# Patient Record
Sex: MALE | Race: BLACK OR AFRICAN AMERICAN | NOT HISPANIC OR LATINO | Employment: FULL TIME | RURAL
[De-identification: names, ages, dates, MRNs, and addresses within clinical notes are randomized per-mention and may not be internally consistent; named-entity substitution may affect disease eponyms.]

---

## 2017-07-03 ENCOUNTER — OFFICE VISIT (OUTPATIENT)
Dept: FAMILY MEDICINE CLINIC | Facility: CLINIC | Age: 47
End: 2017-07-03

## 2017-07-03 VITALS
HEIGHT: 70 IN | OXYGEN SATURATION: 99 % | BODY MASS INDEX: 32.25 KG/M2 | HEART RATE: 63 BPM | TEMPERATURE: 97.6 F | WEIGHT: 225.3 LBS | DIASTOLIC BLOOD PRESSURE: 90 MMHG | SYSTOLIC BLOOD PRESSURE: 124 MMHG

## 2017-07-03 DIAGNOSIS — N50.811 PAIN IN RIGHT TESTICLE: ICD-10-CM

## 2017-07-03 DIAGNOSIS — Z76.89 ENCOUNTER TO ESTABLISH CARE WITH NEW DOCTOR: ICD-10-CM

## 2017-07-03 DIAGNOSIS — M54.31 SCIATICA OF RIGHT SIDE: Primary | ICD-10-CM

## 2017-07-03 DIAGNOSIS — J30.2 SEASONAL ALLERGIC RHINITIS, UNSPECIFIED ALLERGIC RHINITIS TRIGGER: ICD-10-CM

## 2017-07-03 PROCEDURE — 99203 OFFICE O/P NEW LOW 30 MIN: CPT | Performed by: FAMILY MEDICINE

## 2017-07-03 RX ORDER — CYCLOBENZAPRINE HCL 10 MG
10 TABLET ORAL 2 TIMES DAILY PRN
Qty: 60 TABLET | Refills: 1 | Status: SHIPPED | OUTPATIENT
Start: 2017-07-03 | End: 2018-04-20

## 2017-07-03 RX ORDER — IBUPROFEN 800 MG/1
TABLET ORAL
Refills: 0 | COMMUNITY
Start: 2017-06-02 | End: 2017-07-03

## 2017-07-03 NOTE — PROGRESS NOTES
Subjective Pt of Dr. KHANG LAURA Anderson is a 46 y.o. obese male non-smoker with Seasonal Allergies, Recent concerns for back pain, see PMH/PSH. Pt presents for exam, to est care, discuss Tx and F/u plan. 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    ' Began having back pain last Dec, Mid an R low back, around side, down R leg , into R testicle. Has seen Dr Madrid, given Motrin. Back pain episodes, occurring more since April.  Occasionally has pain up under R ribcage. Allergies have been OK. Would like check up, exam, see what might be causing pain. Had all labs in past few months with Dr. Madrid, was not told if any problem'.     Back Pain   This is a chronic problem. The current episode started more than 1 month ago. The problem occurs daily. The problem has been gradually worsening since onset. The pain is present in the lumbar spine and gluteal. The quality of the pain is described as aching, shooting and stabbing. The pain radiates to the right thigh. The pain is at a severity of 6/10. The pain is moderate. The symptoms are aggravated by standing. Stiffness is present all day. Associated symptoms include leg pain, paresthesias and tingling. Pertinent negatives include no bladder incontinence, bowel incontinence, dysuria, numbness, paresis, pelvic pain, perianal numbness, weakness or weight loss. (R testicle pain) Risk factors include obesity (New work environment). He has tried analgesics and NSAIDs for the symptoms. The treatment provided mild relief.   Obesity   This is a chronic problem. The current episode started more than 1 year ago. The problem occurs daily. Associated symptoms include coughing. Pertinent negatives include no congestion, diaphoresis, neck pain, numbness, vomiting or weakness. The symptoms are aggravated by bending, exertion, twisting and standing (For back pain). He has tried nothing for the symptoms. The treatment  provided no relief.   Breathing Problem   He complains of cough and difficulty breathing. There is no shortness of breath or wheezing. Primary symptoms comments: Seasonal allergies. This is a chronic problem. The problem has been resolved (Controlled). Pertinent negatives include no appetite change, ear pain, postnasal drip, rhinorrhea, sneezing, trouble swallowing or weight loss. His symptoms are aggravated by pollen. Relieved by: OTC meds. He reports significant improvement on treatment.        The following portions of the patient's history were reviewed and updated as appropriate: allergies, current medications, past family history, past medical history, past social history, past surgical history and problem list.    Review of Systems   Constitutional: Negative for activity change, appetite change, diaphoresis, unexpected weight change and weight loss.   HENT: Positive for nosebleeds. Negative for congestion, dental problem, drooling, ear discharge, ear pain, facial swelling, hearing loss, mouth sores, postnasal drip, rhinorrhea, sinus pressure, sneezing, tinnitus, trouble swallowing and voice change.    Eyes: Negative for photophobia, pain, discharge, redness, itching and visual disturbance.        Eye exam [facility] works   Respiratory: Positive for cough. Negative for apnea, choking, chest tightness, shortness of breath, wheezing and stridor.    Cardiovascular: Negative for palpitations and leg swelling.   Gastrointestinal: Negative for abdominal distention, anal bleeding, blood in stool, bowel incontinence, constipation, diarrhea, rectal pain and vomiting.   Endocrine: Negative for cold intolerance, heat intolerance, polydipsia, polyphagia and polyuria.   Genitourinary: Positive for flank pain. Negative for bladder incontinence, decreased urine volume, difficulty urinating, discharge, dysuria, enuresis, frequency, genital sores, hematuria, pelvic pain, penile pain, penile swelling, scrotal swelling, testicular  pain and urgency.        Pain from back shoots into R testicle.   Musculoskeletal: Positive for back pain. Negative for gait problem, neck pain and neck stiffness.   Skin: Negative for color change, pallor and wound.   Allergic/Immunologic: Negative for environmental allergies, food allergies and immunocompromised state.   Neurological: Positive for tingling and paresthesias. Negative for dizziness, tremors, seizures, syncope, facial asymmetry, speech difficulty, weakness, light-headedness and numbness.   Hematological: Negative for adenopathy. Does not bruise/bleed easily.   Psychiatric/Behavioral: Negative for agitation, behavioral problems, confusion, decreased concentration, dysphoric mood, hallucinations, self-injury, sleep disturbance and suicidal ideas. The patient is not nervous/anxious and is not hyperactive.        Objective   Physical Exam   Constitutional: He is oriented to person, place, and time. He appears well-developed and well-nourished.   HENT:   Head: Normocephalic.   Right Ear: External ear normal.   Left Ear: External ear normal.   Mouth/Throat: Oropharynx is clear and moist.   Eyes: Conjunctivae and EOM are normal. Pupils are equal, round, and reactive to light. Right eye exhibits no discharge. Left eye exhibits no discharge. No scleral icterus.   Neck: Normal range of motion. Neck supple. No JVD present. No tracheal deviation present. No thyromegaly present.   Cardiovascular: Normal rate, regular rhythm, normal heart sounds and intact distal pulses.  Exam reveals no gallop and no friction rub.    No murmur heard.  Pulmonary/Chest: Effort normal and breath sounds normal. No respiratory distress. He has no wheezes. He has no rales. He exhibits no tenderness.   Abdominal: Soft. Bowel sounds are normal. He exhibits no distension and no mass. There is no tenderness. No hernia.   Musculoskeletal: Normal range of motion. He exhibits no edema, tenderness or deformity.   Lymphadenopathy:     He has no  cervical adenopathy.   Neurological: He is alert and oriented to person, place, and time. He has normal reflexes. He displays normal reflexes. No cranial nerve deficit. He exhibits normal muscle tone. Coordination normal.   Skin: Skin is warm and dry.   Psychiatric: He has a normal mood and affect. His behavior is normal. Thought content normal.   Nursing note and vitals reviewed.      Assessment/Plan      Adolfo was seen today for establish care, pain and allergies.    Diagnoses and all orders for this visit:    Sciatica of right side  -     XR Spine Lumbar 4+ View (In Office)    Seasonal allergic rhinitis, unspecified allergic rhinitis trigger    Pain in right testicle    BMI 32.0-32.9,adult    Encounter to establish care with new doctor    Other orders  -     cyclobenzaprine (FLEXERIL) 10 MG tablet; Take 1 tablet by mouth 2 (Two) Times a Day As Needed for Muscle Spasms.      Discussed current exam, discussed R sciatica, check x-ray, trial of muscle relaxer. Discussed BMI, BEE, 3-4 small meals, exercise, back exercise. Discussed referral to Physical therapy, desires to wait until after x-ray results, and f/U . Discussed obtaining labs to reviewe, discussed USPSTF recommendations.           This document has been electronically signed by Gilles Canales MD

## 2017-07-04 PROBLEM — Z76.89 ENCOUNTER TO ESTABLISH CARE WITH NEW DOCTOR: Status: ACTIVE | Noted: 2017-07-04

## 2017-07-04 PROBLEM — J30.2 SEASONAL ALLERGIC RHINITIS: Status: ACTIVE | Noted: 2017-07-04

## 2017-07-04 PROBLEM — M54.31 SCIATICA OF RIGHT SIDE: Status: ACTIVE | Noted: 2017-07-04

## 2017-07-04 PROBLEM — N50.811 PAIN IN RIGHT TESTICLE: Status: ACTIVE | Noted: 2017-07-04

## 2017-07-04 NOTE — PATIENT INSTRUCTIONS
Sciatica  Sciatica is pain, numbness, weakness, or tingling along the path of the sciatic nerve. The sciatic nerve starts in the lower back and runs down the back of each leg. The nerve controls the muscles in the lower leg and in the back of the knee. It also provides feeling (sensation) to the back of the thigh, the lower leg, and the sole of the foot. Sciatica is a symptom of another medical condition that pinches or puts pressure on the sciatic nerve.  Generally, sciatica only affects one side of the body. Sciatica usually goes away on its own or with treatment. In some cases, sciatica may keep coming back (recur).  CAUSES  This condition is caused by pressure on the sciatic nerve, or pinching of the sciatic nerve. This may be the result of:  · A disk in between the bones of the spine (vertebrae) bulging out too far (herniated disk).  · Age-related changes in the spinal disks (degenerative disk disease).  · A pain disorder that affects a muscle in the buttock (piriformis syndrome).  · Extra bone growth (bone spur) near the sciatic nerve.  · An injury or break (fracture) of the pelvis.  · Pregnancy.  · Tumor (rare).  RISK FACTORS  The following factors may make you more likely to develop this condition:  · Playing sports that place pressure or stress on the spine, such as football or weight lifting.  · Having poor strength and flexibility.  · A history of back injury.  · A history of back surgery.  · Sitting for long periods of time.  · Doing activities that involve repetitive bending or lifting.  · Obesity.  SYMPTOMS  Symptoms can vary from mild to very severe, and they may include:  · Any of these problems in the lower back, leg, hip, or buttock:    Mild tingling or dull aches.    Burning sensations.    Sharp pains.  · Numbness in the back of the calf or the sole of the foot.  · Leg weakness.  · Severe back pain that makes movement difficult.  These symptoms may get worse when you cough, sneeze, or laugh, or  when you sit or stand for long periods of time. Being overweight may also make symptoms worse. In some cases, symptoms may recur over time.  DIAGNOSIS  This condition may be diagnosed based on:  · Your symptoms.  · A physical exam. Your health care provider may ask you to do certain movements to check whether those movements trigger your symptoms.  · You may have tests, including:    Blood tests.    X-rays.    MRI.    CT scan.  TREATMENT  In many cases, this condition improves on its own, without any treatment. However, treatment may include:  · Reducing or modifying physical activity during periods of pain.  · Exercising and stretching to strengthen your abdomen and improve the flexibility of your spine.  · Icing and applying heat to the affected area.  · Medicines that help:    To relieve pain and swelling.    To relax your muscles.  · Injections of medicines that help to relieve pain, irritation, and inflammation around the sciatic nerve (steroids).  · Surgery.  HOME CARE INSTRUCTIONS  Medicines  · Take over-the-counter and prescription medicines only as told by your health care provider.  · Do not drive or operate heavy machinery while taking prescription pain medicine.  Managing Pain  · If directed, apply ice to the affected area.    Put ice in a plastic bag.    Place a towel between your skin and the bag.    Leave the ice on for 20 minutes, 2-3 times a day.  · After icing, apply heat to the affected area before you exercise or as often as told by your health care provider. Use the heat source that your health care provider recommends, such as a moist heat pack or a heating pad.    Place a towel between your skin and the heat source.    Leave the heat on for 20-30 minutes.    Remove the heat if your skin turns bright red. This is especially important if you are unable to feel pain, heat, or cold. You may have a greater risk of getting burned.  Activity  · Return to your normal activities as told by your health  care provider. Ask your health care provider what activities are safe for you.    Avoid activities that make your symptoms worse.  · Take brief periods of rest throughout the day. Resting in a lying or standing position is usually better than sitting to rest.    When you rest for longer periods, mix in some mild activity or stretching between periods of rest. This will help to prevent stiffness and pain.    Avoid sitting for long periods of time without moving. Get up and move around at least one time each hour.  · Exercise and stretch regularly, as told by your health care provider.  · Do not lift anything that is heavier than 10 lb (4.5 kg) while you have symptoms of sciatica. When you do not have symptoms, you should still avoid heavy lifting, especially repetitive heavy lifting.  · When you lift objects, always use proper lifting technique, which includes:    Bending your knees.    Keeping the load close to your body.    Avoiding twisting.  General Instructions   · Use good posture.    Avoid leaning forward while sitting.    Avoid hunching over while standing.  · Maintain a healthy weight. Excess weight puts extra stress on your back and makes it difficult to maintain good posture.  · Wear supportive, comfortable shoes. Avoid wearing high heels.  · Avoid sleeping on a mattress that is too soft or too hard. A mattress that is firm enough to support your back when you sleep may help to reduce your pain.  · Keep all follow-up visits as told by your health care provider. This is important.  SEEK MEDICAL CARE IF:  · You have pain that wakes you up when you are sleeping.  · You have pain that gets worse when you lie down.  · Your pain is worse than you have experienced in the past.  · Your pain lasts longer than 4 weeks.  · You experience unexplained weight loss.  SEEK IMMEDIATE MEDICAL CARE IF:  · You lose control of your bowel or bladder (incontinence).  · You have:    Weakness in your lower back, pelvis, buttocks,  or legs that gets worse.    Redness or swelling of your back.    A burning sensation when you urinate.     This information is not intended to replace advice given to you by your health care provider. Make sure you discuss any questions you have with your health care provider.     Document Released: 12/12/2002 Document Revised: 04/10/2017 Document Reviewed: 08/26/2016  [employer] Patient Education ©2017 [employer]

## 2017-07-06 ENCOUNTER — TELEPHONE (OUTPATIENT)
Dept: FAMILY MEDICINE CLINIC | Facility: CLINIC | Age: 47
End: 2017-07-06

## 2017-07-06 NOTE — TELEPHONE ENCOUNTER
----- Message from Gilles Canales MD sent at 7/4/2017  7:58 AM CDT -----  Has disc space narrowing , and arthritic changes of Lumbar spine. Will go over at F/U visit.

## 2017-08-03 ENCOUNTER — OFFICE VISIT (OUTPATIENT)
Dept: FAMILY MEDICINE CLINIC | Facility: CLINIC | Age: 47
End: 2017-08-03

## 2017-08-03 VITALS
WEIGHT: 228.1 LBS | BODY MASS INDEX: 32.65 KG/M2 | DIASTOLIC BLOOD PRESSURE: 74 MMHG | OXYGEN SATURATION: 99 % | HEART RATE: 70 BPM | SYSTOLIC BLOOD PRESSURE: 130 MMHG | TEMPERATURE: 98.1 F | HEIGHT: 70 IN

## 2017-08-03 DIAGNOSIS — M79.674 CHRONIC TOE PAIN, RIGHT FOOT: ICD-10-CM

## 2017-08-03 DIAGNOSIS — G89.29 CHRONIC TOE PAIN, RIGHT FOOT: ICD-10-CM

## 2017-08-03 DIAGNOSIS — J30.2 SEASONAL ALLERGIC RHINITIS, UNSPECIFIED ALLERGIC RHINITIS TRIGGER: ICD-10-CM

## 2017-08-03 DIAGNOSIS — M54.31 SCIATICA OF RIGHT SIDE: Primary | ICD-10-CM

## 2017-08-03 PROCEDURE — 99213 OFFICE O/P EST LOW 20 MIN: CPT | Performed by: FAMILY MEDICINE

## 2017-08-05 NOTE — PATIENT INSTRUCTIONS
Calorie Counting for Weight Loss  Calories are energy you get from the things you eat and drink. Your body uses this energy to keep you going throughout the day. The number of calories you eat affects your weight. When you eat more calories than your body needs, your body stores the extra calories as fat. When you eat fewer calories than your body needs, your body burns fat to get the energy it needs.  Calorie counting means keeping track of how many calories you eat and drink each day. If you make sure to eat fewer calories than your body needs, you should lose weight. In order for calorie counting to work, you will need to eat the number of calories that are right for you in a day to lose a healthy amount of weight per week. A healthy amount of weight to lose per week is usually 1-2 lb (0.5-0.9 kg). A dietitian can determine how many calories you need in a day and give you suggestions on how to reach your calorie goal.   WHAT IS MY MY PLAN?  My goal is to have __________ calories per day.   If I have this many calories per day, I should lose around __________ pounds per week.  WHAT DO I NEED TO KNOW ABOUT CALORIE COUNTING?  In order to meet your daily calorie goal, you will need to:  · Find out how many calories are in each food you would like to eat. Try to do this before you eat.  · Decide how much of the food you can eat.  · Write down what you ate and how many calories it had. Doing this is called keeping a food log.  WHERE DO I FIND CALORIE INFORMATION?  The number of calories in a food can be found on a Nutrition Facts label. Note that all the information on a label is based on a specific serving of the food. If a food does not have a Nutrition Facts label, try to look up the calories online or ask your dietitian for help.  HOW DO I DECIDE HOW MUCH TO EAT?  To decide how much of the food you can eat, you will need to consider both the number of calories in one serving and the size of one serving. This  information can be found on the Nutrition Facts label. If a food does not have a Nutrition Facts label, look up the information online or ask your dietitian for help.  Remember that calories are listed per serving. If you choose to have more than one serving of a food, you will have to multiply the calories per serving by the amount of servings you plan to eat. For example, the label on a package of bread might say that a serving size is 1 slice and that there are 90 calories in a serving. If you eat 1 slice, you will have eaten 90 calories. If you eat 2 slices, you will have eaten 180 calories.  HOW DO I KEEP A FOOD LOG?  After each meal, record the following information in your food log:  · What you ate.  · How much of it you ate.  · How many calories it had.  · Then, add up your calories.  Keep your food log near you, such as in a small notebook in your pocket. Another option is to use a mobile naseem or website. Some programs will calculate calories for you and show you how many calories you have left each time you add an item to the log.  WHAT ARE SOME CALORIE COUNTING TIPS?  · Use your calories on foods and drinks that will fill you up and not leave you hungry. Some examples of this include foods like nuts and nut butters, vegetables, lean proteins, and high-fiber foods (more than 5 g fiber per serving).  · Eat nutritious foods and avoid empty calories. Empty calories are calories you get from foods or beverages that do not have many nutrients, such as candy and soda. It is better to have a nutritious high-calorie food (such as an avocado) than a food with few nutrients (such as a bag of chips).  · Know how many calories are in the foods you eat most often. This way, you do not have to look up how many calories they have each time you eat them.  · Look out for foods that may seem like low-calorie foods but are really high-calorie foods, such as baked goods, soda, and fat-free candy.  · Pay attention to calories  in drinks. Drinks such as sodas, specialty coffee drinks, alcohol, and juices have a lot of calories yet do not fill you up. Choose low-calorie drinks like water and diet drinks.  · Focus your calorie counting efforts on higher calorie items. Logging the calories in a garden salad that contains only vegetables is less important than calculating the calories in a milk shake.  · Find a way of tracking calories that works for you. Get creative. Most people who are successful find ways to keep track of how much they eat in a day, even if they do not count every calorie.  WHAT ARE SOME PORTION CONTROL TIPS?  · Know how many calories are in a serving. This will help you know how many servings of a certain food you can have.  · Use a measuring cup to measure serving sizes. This is helpful when you start out. With time, you will be able to estimate serving sizes for some foods.  · Take some time to put servings of different foods on your favorite plates, bowls, and cups so you know what a serving looks like.  · Try not to eat straight from a bag or box. Doing this can lead to overeating. Put the amount you would like to eat in a cup or on a plate to make sure you are eating the right portion.  · Use smaller plates, glasses, and bowls to prevent overeating. This is a quick and easy way to practice portion control. If your plate is smaller, less food can fit on it.  · Try not to multitask while eating, such as watching TV or using your computer. If it is time to eat, sit down at a table and enjoy your food. Doing this will help you to start recognizing when you are full. It will also make you more aware of what and how much you are eating.  HOW CAN I CALORIE COUNT WHEN EATING OUT?  · Ask for smaller portion sizes or child-sized portions.  · Consider sharing an entree and sides instead of getting your own entree.  · If you get your own entree, eat only half. Ask for a box at the beginning of your meal and put the rest of your  "entree in it so you are not tempted to eat it.  · Look for the calories on the menu. If calories are listed, choose the lower calorie options.  · Choose dishes that include vegetables, fruits, whole grains, low-fat dairy products, and lean protein. Focusing on smart food choices from each of the 5 food groups can help you stay on track at restaurants.  · Choose items that are boiled, broiled, grilled, or steamed.  · Choose water, milk, unsweetened iced tea, or other drinks without added sugars. If you want an alcoholic beverage, choose a lower calorie option. For example, a regular tom can have up to 700 calories and a glass of wine has around 150.  · Stay away from items that are buttered, battered, fried, or served with cream sauce. Items labeled \"crispy\" are usually fried, unless stated otherwise.  · Ask for dressings, sauces, and syrups on the side. These are usually very high in calories, so do not eat much of them.  · Watch out for salads. Many people think salads are a healthy option, but this is often not the case. Many salads come with lau, fried chicken, lots of cheese, fried chips, and dressing. All of these items have a lot of calories. If you want a salad, choose a garden salad and ask for grilled meats or steak. Ask for the dressing on the side, or ask for olive oil and vinegar or lemon to use as dressing.  · Estimate how many servings of a food you are given. For example, a serving of cooked rice is ½ cup or about the size of half a tennis ball or one cupcake wrapper. Knowing serving sizes will help you be aware of how much food you are eating at restaurants. The list below tells you how big or small some common portion sizes are based on everyday objects.    1 oz--4 stacked dice.    3 oz--1 deck of cards.    1 tsp--1 dice.    1 Tbsp--½ a Ping-Pong ball.    2 Tbsp--1 Ping-Pong ball.    ½ cup--1 tennis ball or 1 cupcake wrapper.    1 cup--1 baseball.     This information is not intended to " replace advice given to you by your health care provider. Make sure you discuss any questions you have with your health care provider.     Document Released: 12/18/2006 Document Revised: 01/08/2016 Document Reviewed: 10/23/2014  [employer] Patient Education ©2017 [employer]  Preventive Care 40-64 Years, Male  Preventive care refers to lifestyle choices and visits with your health care provider that can promote health and wellness.  WHAT DOES PREVENTIVE CARE INCLUDE?  · A yearly physical exam. This is also called an annual well check.  · Dental exams once or twice a year.  · Routine eye exams. Ask your health care provider how often you should have your eyes checked.  · Personal lifestyle choices, including:    Daily care of your teeth and gums.    Regular physical activity.    Eating a healthy diet.    Avoiding tobacco and drug use.    Limiting alcohol use.    Practicing safe sex.    Taking low-dose aspirin every day starting at age 50.  WHAT HAPPENS DURING AN ANNUAL WELL CHECK?  The services and screenings done by your health care provider during your annual well check will depend on your age, overall health, lifestyle risk factors, and family history of disease.  Counseling  Your health care provider may ask you questions about your:  · Alcohol use.  · Tobacco use.  · Drug use.  · Emotional well-being.  · Home and relationship well-being.  · Sexual activity.  · Eating habits.  · Work and work environment.  Screening  You may have the following tests or measurements:  · Height, weight, and BMI.  · Blood pressure.  · Lipid and cholesterol levels. These may be checked every 5 years, or more frequently if you are over 50 years old.  · Skin check.  · Lung cancer screening. You may have this screening every year starting at age 55 if you have a 30-pack-year history of smoking and currently smoke or have quit within the past 15 years.  · Fecal occult blood test (FOBT) of the stool. You may have this test  every year starting at age 50.  · Flexible sigmoidoscopy or colonoscopy. You may have a sigmoidoscopy every 5 years or a colonoscopy every 10 years starting at age 50.  · Prostate cancer screening. Recommendations will vary depending on your family history and other risks.  · Hepatitis C blood test.  · Hepatitis B blood test.  · Sexually transmitted disease (STD) testing.  · Diabetes screening. This is done by checking your blood sugar (glucose) after you have not eaten for a while (fasting). You may have this done every 1-3 years.  Discuss your test results, treatment options, and if necessary, the need for more tests with your health care provider.  Vaccines  Your health care provider may recommend certain vaccines, such as:  · Influenza vaccine. This is recommended every year.  · Tetanus, diphtheria, and acellular pertussis (Tdap, Td) vaccine. You may need a Td booster every 10 years.  · Varicella vaccine. You may need this if you have not been vaccinated.  · Zoster vaccine. You may need this after age 60.  · Measles, mumps, and rubella (MMR) vaccine. You may need at least one dose of MMR if you were born in 1957 or later. You may also need a second dose.  · Pneumococcal 13-valent conjugate (PCV13) vaccine. You may need this if you have certain conditions and have not been vaccinated.  · Pneumococcal polysaccharide (PPSV23) vaccine. You may need one or two doses if you smoke cigarettes or if you have certain conditions.  · Meningococcal vaccine. You may need this if you have certain conditions.  · Hepatitis A vaccine. You may need this if you have certain conditions or if you travel or work in places where you may be exposed to hepatitis A.  · Hepatitis B vaccine. You may need this if you have certain conditions or if you travel or work in places where you may be exposed to hepatitis B.  · Haemophilus influenzae type b (Hib) vaccine. You may need this if you have certain risk factors.  Talk to your health care  provider about which screenings and vaccines you need and how often you need them.     This information is not intended to replace advice given to you by your health care provider. Make sure you discuss any questions you have with your health care provider.     Document Released: 01/13/2017 Document Reviewed: 01/13/2017  [employer] Interactive Patient Education ©2017 [employer]

## 2017-08-05 NOTE — PROGRESS NOTES
Subjective   Adolfo Barron is a 46 y.o. obese male non-smoker with seasonal Allergies,  R sciatica, see PMH/PSH. Pt presents for exam, to discuss Tx and F/u plan.   ' R back pain shooing into R testicle has resolved. Seems to come back after working, lifting etc. Changed duties at work, has less stress on back. Would like to prevent pain from coming back'.         Back Pain   This is a chronic problem. The current episode started more than 1 month ago. The problem occurs intermittently. The problem has been gradually improving since onset. The pain is present in the lumbar spine and gluteal. The quality of the pain is described as aching, shooting and stabbing. The pain radiates to the right thigh. The pain is at a severity of 0/10. The patient is experiencing no pain. The symptoms are aggravated by standing. Pertinent negatives include no bladder incontinence, bowel incontinence, dysuria, leg pain, numbness, paresis, paresthesias, pelvic pain, perianal numbness, tingling or weakness. (R testicle pain) Risk factors include obesity (New work environment). He has tried analgesics and NSAIDs for the symptoms. The treatment provided mild relief.   Testicle Pain   The patient's pertinent negatives include no pelvic pain, penile discharge, penile pain, scrotal swelling or testicular pain. Pertinent negatives include no constipation, coughing, diarrhea, dysuria, flank pain, frequency, shortness of breath, urgency or vomiting.   Toe Pain    Pertinent negatives include no numbness or tingling.   Obesity   This is a chronic problem. The current episode started more than 1 year ago. The problem occurs daily. Pertinent negatives include no congestion, coughing, diaphoresis, neck pain, numbness, vomiting or weakness. The symptoms are aggravated by bending, exertion, twisting and standing (For back pain). He has tried nothing for the symptoms. The treatment provided no relief.        The following portions of the patient's  history were reviewed and updated as appropriate: allergies, current medications, past family history, past medical history, past social history, past surgical history and problem list.    Review of Systems   Constitutional: Negative for activity change, appetite change, diaphoresis and unexpected weight change.   HENT: Negative for congestion, dental problem, drooling, ear discharge, ear pain, facial swelling, hearing loss, mouth sores, nosebleeds, postnasal drip, rhinorrhea, sinus pressure, sneezing, tinnitus, trouble swallowing and voice change.    Eyes: Negative for photophobia, pain, discharge, redness, itching and visual disturbance.        Eye exam [facility] works   Respiratory: Negative for apnea, cough, choking, chest tightness, shortness of breath, wheezing and stridor.    Cardiovascular: Negative for palpitations and leg swelling.   Gastrointestinal: Negative for abdominal distention, anal bleeding, blood in stool, bowel incontinence, constipation, diarrhea, rectal pain and vomiting.   Endocrine: Negative for cold intolerance, heat intolerance, polydipsia, polyphagia and polyuria.   Genitourinary: Negative for bladder incontinence, decreased urine volume, difficulty urinating, discharge, dysuria, enuresis, flank pain, frequency, genital sores, hematuria, pelvic pain, penile pain, penile swelling, scrotal swelling, testicular pain and urgency.        Pain from back shoots into R testicle.   Musculoskeletal: Negative for back pain, gait problem, neck pain and neck stiffness.        No back pain today   Skin: Negative for color change, pallor and wound.   Allergic/Immunologic: Negative for environmental allergies, food allergies and immunocompromised state.   Neurological: Negative for dizziness, tingling, tremors, seizures, syncope, facial asymmetry, speech difficulty, weakness, light-headedness, numbness and paresthesias.   Hematological: Negative for adenopathy. Does not bruise/bleed easily.    Psychiatric/Behavioral: Negative for agitation, behavioral problems, confusion, decreased concentration, dysphoric mood, hallucinations, self-injury, sleep disturbance and suicidal ideas. The patient is not nervous/anxious and is not hyperactive.        Objective   Physical Exam   Constitutional: He is oriented to person, place, and time. He appears well-developed and well-nourished.   HENT:   Head: Normocephalic.   Right Ear: External ear normal.   Left Ear: External ear normal.   Nose: Nose normal.   Mouth/Throat: Oropharynx is clear and moist.   Eyes: Conjunctivae and EOM are normal. Pupils are equal, round, and reactive to light. Right eye exhibits no discharge. Left eye exhibits no discharge. No scleral icterus.   Neck: Normal range of motion. Neck supple. No JVD present. No tracheal deviation present. No thyromegaly present.   Cardiovascular: Normal rate, regular rhythm, normal heart sounds and intact distal pulses.  Exam reveals no gallop and no friction rub.    No murmur heard.  Pulmonary/Chest: Effort normal and breath sounds normal. No respiratory distress. He has no wheezes. He has no rales. He exhibits no tenderness.   Abdominal: Soft. Bowel sounds are normal. He exhibits no distension and no mass. There is no tenderness. No hernia.   Musculoskeletal: Normal range of motion. He exhibits no edema, tenderness or deformity.   Lymphadenopathy:     He has no cervical adenopathy.   Neurological: He is alert and oriented to person, place, and time. He has normal reflexes. He displays normal reflexes. No cranial nerve deficit. He exhibits normal muscle tone. Coordination normal.   Skin: Skin is warm and dry.   Psychiatric: He has a normal mood and affect. His behavior is normal. Judgment and thought content normal.   Nursing note and vitals reviewed.      Assessment/Plan   Adolfo was seen today for back pain, testicle pain and toe pain.    Diagnoses and all orders for this visit:    Sciatica of right side  -      Ambulatory Referral to Physical Therapy Evaluate and treat    Chronic toe pain, right foot  -     Ambulatory Referral to Podiatry    Seasonal allergic rhinitis, unspecified allergic rhinitis trigger    BMI 32.0-32.9,adult      Discussed current health problem list. Discussed referral to Physical Therapy. Discussed BMI, BEE, 3-4 small meals 1800 regino/day, Discussed USPSTF recommendations, obtaining recent labs for review. Discuss F/U plan.          This document has been electronically signed by Gilles Canales MD

## 2017-08-11 ENCOUNTER — OFFICE VISIT (OUTPATIENT)
Dept: PODIATRY | Facility: CLINIC | Age: 47
End: 2017-08-11

## 2017-08-11 VITALS — HEIGHT: 70 IN | WEIGHT: 228 LBS | BODY MASS INDEX: 32.64 KG/M2

## 2017-08-11 DIAGNOSIS — L60.0 INGROWN TOENAIL: ICD-10-CM

## 2017-08-11 DIAGNOSIS — M79.671 RIGHT FOOT PAIN: Primary | ICD-10-CM

## 2017-08-11 PROCEDURE — 99203 OFFICE O/P NEW LOW 30 MIN: CPT | Performed by: PODIATRIST

## 2017-08-11 NOTE — PROGRESS NOTES
"Adolfo Barron  1970  46 y.o. male  Patient presents today for right hallux pain.   Patient states it only hurts when applying pressure to the toe.    8/11/2017  Chief Complaint   Patient presents with   • Right Foot - Toe Pain           History of Present Illness    46-year-old male presents to clinic today with chief complaint of right foot pain.  Pain is located to the toenail of the great toe.  States that a while back she dropped something on the toe and since then he has had trouble with ingrowing toenail.  He states that he gets infected from time to time.  It causes him pain when applying pressure in shoe gear.  He he cuts it out himself which relieves the pain.  He also complains of small hard areas in the bottom of both of his feet.  These have been cut out in the past which helped.  He has no other pedal complaints.         Past Medical History:   Diagnosis Date   • Acute bronchitis    • Acute otitis media    • Acute sinusitis    • Allergic rhinitis    • Biceps femoris tendinitis    • Candidiasis of skin and nails    • Chronic allergic conjunctivitis    • Cough    • Upper respiratory infection          No past surgical history on file.      No family history on file.      Social History     Social History   • Marital status:      Spouse name: N/A   • Number of children: N/A   • Years of education: N/A     Occupational History   • Not on file.     Social History Main Topics   • Smoking status: Never Smoker   • Smokeless tobacco: Never Used   • Alcohol use Not on file   • Drug use: Not on file   • Sexual activity: Not on file     Other Topics Concern   • Not on file     Social History Narrative         Current Outpatient Prescriptions   Medication Sig Dispense Refill   • cyclobenzaprine (FLEXERIL) 10 MG tablet Take 1 tablet by mouth 2 (Two) Times a Day As Needed for Muscle Spasms. 60 tablet 1     No current facility-administered medications for this visit.          OBJECTIVE    Ht 70\" (177.8 cm) "  Wt 228 lb (103 kg)  BMI 32.71 kg/m2      Review of Systems   Constitutional: Negative for chills and fever.   Cardiovascular: Negative for chest pain.   Gastrointestinal: Negative for constipation, diarrhea, nausea and vomiting.   Skin: Negative for wound.  ingrowing toenail  Musculoskeletal: Right foot pain      Constitutional: well developed, well nourished    HEENT: Normocephalic and atraumatic, normal hearing    Respiratory: Non labored respirations noted    Cardiovascular:    DP/PT pulses palpable    CFT brisk  to all digits  Skin temp is warm to warm from proximal tibia to distal digits  Pedal hair growth present.   No erythema or edema noted     Musculoskeletal:  Muscle strength is 5/5 for all muscle groups tested   ROM of the 1st MTP is full without pain or crepitus  ROM of the MTJ is full without pain or crepitus    ROM of the STJ is full without pain or crepitus    ROM of the ankle joint is full without pain or crepitus    Pain on palpation to the medial border of the right hallux nail  Rectus foot type     Dermatological:   Nails 1-5 are within normal limits for length and thickness, right hallux nail is incurvated on the medial border.  No signs of infection noted    Skin is warm, dry and intact    Webspaces 1-4 bilateral are clean, dry and intact.   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 NAD.             Procedures        ASSESSMENT AND PLAN    Adolfo was seen today for toe pain.    Diagnoses and all orders for this visit:    Right foot pain    Ingrown toenail    - Comprehensive foot and ankle exam performed  - Discussion was held patient regarding treatment of ingrowing toenails.  Options discussed were nail avulsions both temporary and permanent versus slant back.  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  - Slant back performed to right hallux nail  - Trimmed portal keratoses with dermal curet  - All questions were answered and the patient is in agreement with the current treatment plan.  - RTC prn            This document has been electronically signed by Todd Spear DPM on August 11, 2017 3:51 PM     8/11/2017  3:51 PM    EMR Dragon/Transcription disclaimer:   Much of this encounter note is an electronic transcription/translation of spoken language to printed text. The electronic translation of spoken language may permit erroneous, or at times, nonsensical words or phrases to be inadvertently transcribed; Although I have reviewed the note for such errors, some may still exist.

## 2017-08-16 ENCOUNTER — APPOINTMENT (OUTPATIENT)
Dept: PHYSICAL THERAPY | Facility: HOSPITAL | Age: 47
End: 2017-08-16

## 2017-10-19 DIAGNOSIS — M54.31 SCIATICA OF RIGHT SIDE: Primary | ICD-10-CM

## 2017-10-20 ENCOUNTER — HOSPITAL ENCOUNTER (OUTPATIENT)
Dept: PHYSICAL THERAPY | Facility: HOSPITAL | Age: 47
Setting detail: THERAPIES SERIES
Discharge: HOME | End: 2017-10-20

## 2017-10-20 DIAGNOSIS — M54.31 SCIATICA OF RIGHT SIDE: Primary | ICD-10-CM

## 2017-10-20 PROCEDURE — 97162 PT EVAL MOD COMPLEX 30 MIN: CPT | Performed by: PHYSICAL THERAPIST

## 2017-10-20 PROCEDURE — 97110 THERAPEUTIC EXERCISES: CPT | Performed by: PHYSICAL THERAPIST

## 2017-10-20 NOTE — THERAPY EVALUATION
Outpatient Physical Therapy Ortho Initial Evaluation  [facility]  Bonnie Wiggins, PT, DPT, CSCS       Patient Name: Adolfo Barron  : 1970  MRN: [id number]  Today's Date: 10/20/2017      Visit Date: 10/20/2017     Pt reports 0/10 pain pre treatment, 0/10 pain post treatment  Reports N/A% of improvement.  Attended  visits.  Insurance available: 60 visits  Next MD appt: 2017.  Recertification: 11/10/2017.    Patient Active Problem List   Diagnosis   • BMI 32.0-32.9,adult   • Pain in right testicle   • Encounter to establish care with new doctor   • Sciatica of right side   • Seasonal allergic rhinitis   • Chronic toe pain, right foot        Past Medical History:   Diagnosis Date   • Acute bronchitis    • Acute otitis media    • Acute sinusitis    • Allergic rhinitis    • Biceps femoris tendinitis    • Candidiasis of skin and nails    • Chronic allergic conjunctivitis    • Cough    • Upper respiratory infection         History reviewed. No pertinent surgical history.    Visit Dx:     ICD-10-CM ICD-9-CM   1. Sciatica of right side M54.31 724.3     Number of days off work: None    Patient is .    Patient has grown children.           No Known Allergies     Jai as Reviewed Reviewed by You at 8:55 AM.      Outpatient Medications     cyclobenzaprine (FLEXERIL) 10 MG tablet                  Patient History       10/20/17 0800          History    Chief Complaint Pain  -AJ      Type of Pain Lower Extremity / Leg;Back pain  -AJ      Date Current Problem(s) Began --   2017  -AJ      Brief Description of Current Complaint Patient reports he started a new job in February. He reports this job is more standing where his old job was sitting. He reports he was told he has some arthritis in his back. He reports if he stands in one spot >5minutes he gets R leg pain. He reports is he moves around it is okay.  -AJ      Patient/Caregiver Goals Relieve pain;Know what to do  to help the symptoms  -AJ      Current Tobacco Use None  -AJ      Smoking Status Non-smoker  -AJ      Patient's Rating of General Health Very good  -AJ      Occupation/sports/leisure activities Occupation: works line at [employer]; Hobbies: relax  -AJ      What clinical tests have you had for this problem? X-ray  -AJ      Results of Clinical Tests Degenerative changes L4-5, L5-S1. Otherwise  -AJ      History of Previous Related Injuries None  -AJ      Pain     Pain Location Back  -AJ      Pain at Present 0  -AJ      Pain at Best 0  -AJ      Pain at Worst 6  -AJ      Pain Frequency Intermittent  -AJ      What Performance Factors Make the Current Problem(s) WORSE? Bending over or standing  -AJ      What Performance Factors Make the Current Problem(s) BETTER? Changing positions or walking  -AJ      Tolerance Time- Standing 5 minutes  -AJ      Tolerance Time- Sitting unlimited  -AJ      Tolerance Time- Walking unlimited  -AJ      Tolerance Time- Lying unlimited  -AJ      Is your sleep disturbed? Yes   sometimes  -AJ      Difficulties at work? Standing at work  -AJ      Difficulties with ADL's? None.  -AJ      Difficulties with recreational activities? None to report.  -AJ        User Key  (r) = Recorded By, (t) = Taken By, (c) = Cosigned By    Initials Name Provider Type    AJ Bonnie Wiggins, PT Physical Therapist                PT Ortho       10/20/17 0900    Subjective Comments    Subjective Comments Patient reports that he wants to learn what he can do to make the back feel good.  -AJ    Subjective Pain    Able to rate subjective pain? yes  -AJ    Pre-Treatment Pain Level 0  -AJ    Posture/Observations    Posture/Observations Comments No acute distress. Good overall postural awareness.  -AJ    DTR- Lower Quarter Clearing    Patellar tendon (L2-4) Bilateral:;2- Normal response  -AJ    Achilles tendon (S1-2) Bilateral:;2- Normal response  -AJ    Lumbar/SI Special Tests    Standing Flexion Test (SI Dysfunction)  Bilateral:;Negative  -AJ    Stork Test (SI Dysfunction) Bilateral:;Negative  -AJ    Trendelenburg Test (Gluteus Medius Weakness) Bilateral:;Negative  -AJ    Slump Test (Neural Tension) Bilateral:;Negative  -AJ    Lashell Raoul Test (HNP) Negative  -AJ    SLR (Neural Tension) Bilateral:;Negative  -AJ    SI Compression Test (SI Dysfunction) Bilateral:;Negative  -AJ    SI Distraction Test (SI Dysfunction) Bilateral:;Negative  -AJ    ONEIDA (hip vs. SI Dysfunction) Bilateral:;Negative  -AJ    FAIR Test (Piriformis Syndrome) Bilateral:;Negative  -AJ    Sacral Spring Test (SI Dysfunction) Negative  -AJ    ROM (Range of Motion)    General ROM Detail Level pelvis, no LLD noted.  -AJ    Trunk    Flexion AROM --   115°  -AJ    Extension AROM --   30°  -AJ    Left Lateral Flexion AROM WNL (0-35 degrees)  -AJ    Right Lateral Flexion AROM WNL (0-35 degrees)  -AJ    Left Rotation AROM WNL (0-45 degrees)  -AJ    Right Rotation AROM WNL (0-45 degrees)  -AJ    MMT (Manual Muscle Testing)    General MMT Assessment Detail B LE 5/5.  -AJ    Lower Extremity Flexibility    Hamstrings Bilateral:;Mildly limited  -AJ    LE Other Flexibility Bilateral:;Moderately limited   piriformis  -AJ    Pathomechanics    Spine Pathomechanics Hinges into extension at one segment in lumbar;Bends knees with attempted lumbar extension  -AJ    Transfers    Transfer, Comment I with all transfers.  -AJ    Gait Assessment/Treatment    Gait, [place] Level independent  -AJ    Gait, Comment FWB, non-antalgic gait  -AJ      User Key  (r) = Recorded By, (t) = Taken By, (c) = Cosigned By    Initials Name Provider Type    DAISY Wiggins, PT Physical Therapist                Therapy Education       10/20/17 1000          Therapy Education    Given HEP;Symptoms/condition management;Pain management;Posture/body mechanics;Mobility training  -AJ      Program New  -AJ      How Provided Verbal;Demonstration;Written  -AJ      Provided to Patient  -AJ      Level of  "Understanding Verbalized;Demonstrated  -        User Key  (r) = Recorded By, (t) = Taken By, (c) = Cosigned By    Initials Name Provider Type    DAISY Wiggins, PT Physical Therapist                PT OP Goals       10/20/17 0900       PT Short Term Goals    STG Date to Achieve 11/10/17  -     STG 1 I wthi HEP and have addiytions/changes by next recertification.  -AJ     STG 2 Patient able to demonstrate proper log roll technique.  -AJ     STG 3 Patient to report that the rad pain is occuring less often.  -AJ     STG 4 Patient to be more aware of posture and posture correction technique.  -AJ     Long Term Goals    LTG Date to Achieve 11/17/17  -AJ     LTG 1 AROM for lumbar spine all WNL, no increase in pain.  -     LTG 2 Patient able to perform 3 ways planks 10\" hold, x10 each with no increase in pain.  -     LTG 3 Patient able ot show proper lifting technique with no increase in pain from floor to waist to shoulder level.  -     LTG 4 I with final HEP.  -     LTG 5 D/C with final HEP and free 30 day fitness formula membership.  -     Time Calculation    PT Goal Re-Cert Due Date 11/10/17  -       User Key  (r) = Recorded By, (t) = Taken By, (c) = Cosigned By    Initials Name Provider Type    DAISY Wiggins, PT Physical Therapist        Barriers to Rehab: Include significant or possible arthritic/degenerative changes that have occurred within the spine.    Safety Issues: None noted.            PT Assessment/Plan       10/20/17 1000       PT Assessment    Functional Limitations Limitation in home management;Limitations in community activities;Performance in leisure activities;Performance in work activities  -     Impairments Endurance;Impaired muscle endurance;Impaired muscle length;Pain;Poor body mechanics;Posture;Range of motion  -     Assessment Comments Patient did well with all thr ex and given written copy of HEP exercises.  -AJ     Rehab Potential Good  -AJ     " Patient/caregiver participated in establishment of treatment plan and goals Yes  -AJ     Patient would benefit from skilled therapy intervention Yes  -AJ     PT Plan    PT Frequency 2x/week  -     Predicted Duration of Therapy Intervention (days/wks) 2-4 weeks, 4-8 visits  -AJ     Planned CPT's? PT EVAL MOD COMPLELITY: 76595;PT RE-EVAL: 37357;PT THER PROC EA 15 MIN: 23803;PT THER ACT EA 15 MIN: 18118;PT MANUAL THERAPY EA 15 MIN: 27180;PT GAIT TRAINING EA 15 MIN: 21437;PT THER SUPP EA 15 MIN;PT ELECTRICAL STIM UNATTEND:   -     Physical Therapy Interventions (Optional Details) home exercise program;lumbar stabilization;manual therapy techniques;modalities;postural re-education;ROM (Range of Motion);strengthening;stretching;swiss ball techniques;transfer training  -     PT Plan Comments Progress overall core stab.  -AJ       User Key  (r) = Recorded By, (t) = Taken By, (c) = Cosigned By    Initials Name Provider Type    DAISY Wiggins, PT Physical Therapist       Other therapeutic activities and/or exercises will be prescribed depending on the patients progress or lack there of.          Modalities       10/20/17 0900          Ice    Patient denies application of Ice Yes  -        User Key  (r) = Recorded By, (t) = Taken By, (c) = Cosigned By    Initials Name Provider Type    DAISY Wiggins, PT Physical Therapist              Exercises       10/20/17 0900          Subjective Comments    Subjective Comments Patient reports that he wants to learn what he can do to make the back feel good.  -      Subjective Pain    Able to rate subjective pain? yes  -AJ      Pre-Treatment Pain Level 0  -      Exercise 1    Exercise Name 1 Pro II s=8 L4.0  -AJ      Time (Minutes) 1 10 minutes  -      Exercise 2    Exercise Name 2 B St. HS S  -AJ      Reps 2 2  -AJ      Time (Seconds) 2 30 seconds  -      Exercise 3    Exercise Name 3 B sitting piriformis S  -AJ      Reps 3 2  -AJ      Time (Seconds)  "3 30 seconds  -AJ      Exercise 4    Exercise Name 4 LTR  -AJ      Reps 4 10  -AJ      Time (Seconds) 4 10\" hold  -AJ      Exercise 5    Exercise Name 5 JOSE  -AJ      Time (Minutes) 5 5 minutes  -AJ        User Key  (r) = Recorded By, (t) = Taken By, (c) = Cosigned By    Initials Name Provider Type    DAISY Wiggins PT Physical Therapist                              Outcome Measures       10/20/17 1000          Modified Oswestry    Modified Oswestry Score/Comments 1 = 2%  -      Functional Assessment    Outcome Measure Options Modifed Owestry  -AJ        User Key  (r) = Recorded By, (t) = Taken By, (c) = Cosigned By    Initials Name Provider Type    DAISY Wiggins PT Physical Therapist            Time Calculation:   Start Time: 0847  Stop Time: 0930  Time Calculation (min): 43 min  Total Timed Code Minutes- PT: 25 minute(s)     Therapy Charges for Today     Code Description Service Date Service Provider Modifiers Qty    90640002767 HC PT EVAL MOD COMPLEXITY 1 10/20/2017 Bonnie Wiggins, PT GP 1    38062719681 HC PT THER PROC EA 15 MIN 10/20/2017 Bonnie Wiggins, PT GP 2    82166943125 HC PT THER SUPP EA 15 MIN 10/20/2017 Bonnie Wiggins, PT GP 1          PT G-Codes  Outcome Measure Options: Modifed Owestry         Bonnie Wiggins, PT, DPT, CSCS  10/20/2017        "

## 2017-10-25 ENCOUNTER — HOSPITAL ENCOUNTER (OUTPATIENT)
Dept: PHYSICAL THERAPY | Facility: HOSPITAL | Age: 47
Setting detail: THERAPIES SERIES
Discharge: HOME | End: 2017-10-25

## 2017-10-25 DIAGNOSIS — M54.31 SCIATICA OF RIGHT SIDE: Primary | ICD-10-CM

## 2017-10-25 PROCEDURE — 97110 THERAPEUTIC EXERCISES: CPT | Performed by: PHYSICAL THERAPIST

## 2017-10-25 NOTE — THERAPY TREATMENT NOTE
"    Outpatient Physical Therapy Ortho Treatment Note  [facility]  Bonnie Wiggins, PT, DPT, CSCS       Patient Name: Adolfo Barron  : 1970  MRN: [id number]  Today's Date: 10/25/2017      Visit Date: 10/25/2017     Pt reports 2/10 pain pre treatment, 0/10 pain post treatment  Reports 0% of improvement.  Attended 2/2 visits.  Insurance available: 60 visits  Next MD appt: 2017.  Recertification: 11/10/2017.    Visit Dx:    ICD-10-CM ICD-9-CM   1. Sciatica of right side M54.31 724.3       Patient Active Problem List   Diagnosis   • BMI 32.0-32.9,adult   • Pain in right testicle   • Encounter to establish care with new doctor   • Sciatica of right side   • Seasonal allergic rhinitis   • Chronic toe pain, right foot        Past Medical History:   Diagnosis Date   • Acute bronchitis    • Acute otitis media    • Acute sinusitis    • Allergic rhinitis    • Biceps femoris tendinitis    • Candidiasis of skin and nails    • Chronic allergic conjunctivitis    • Cough    • Upper respiratory infection         No past surgical history on file.          PT Ortho       10/25/17 08    Subjective Comments    Subjective Comments Patient reprts she has some mid-back pain today, LB i feeling pretty good.  -    Subjective Pain    Able to rate subjective pain? yes  -    Pre-Treatment Pain Level 2  -    Post-Treatment Pain Level 0   \"worked out\"  -    Posture/Observations    Posture/Observations Comments No distress, poor posture  -      User Key  (r) = Recorded By, (t) = Taken By, (c) = Cosigned By    Initials Name Provider Type    DAISY Wiggins, PT Physical Therapist                            PT Assessment/Plan       10/25/17 08       PT Assessment    Assessment Comments Did well with new ther ex. Good retention of HEP. Decrease in pain post Rx.  -     PT Plan    PT Frequency 2x/week  -     PT Plan Comments Add T-band Rows next session for posture  -       User Key  " "(r) = Recorded By, (t) = Taken By, (c) = Cosigned By    Initials Name Provider Type    AJ Bonnie Wiggins, PT Physical Therapist                    Exercises       10/25/17 0800          Subjective Comments    Subjective Comments Patient reprts she has some mid-back pain today, LB i feeling pretty good.  -AJ      Subjective Pain    Able to rate subjective pain? yes  -AJ      Pre-Treatment Pain Level 2  -AJ      Post-Treatment Pain Level 0   \"worked out\"  -AJ      Exercise 1    Exercise Name 1 Pro II s=8 L4.5  -AJ      Time (Minutes) 1 10 minutes  -AJ      Exercise 2    Exercise Name 2 B St. HS S  -AJ      Reps 2 2  -AJ      Time (Seconds) 2 30 seconds  -AJ      Exercise 3    Exercise Name 3 B sitting piriformis S  -AJ      Reps 3 2  -AJ      Time (Seconds) 3 30 seconds  -AJ      Exercise 4    Exercise Name 4 Elephant S  -AJ      Reps 4 2  -AJ      Time (Seconds) 4 30 seconds  -AJ      Exercise 5    Exercise Name 5 Seated Thoracic ROT S  -AJ      Reps 5 10  -AJ      Time (Seconds) 5 10\" hold  -AJ      Exercise 6    Exercise Name 6 Bridges  -AJ      Sets 6 2  -AJ      Reps 6 10  -AJ      Time (Seconds) 6 5\" hold  -AJ      Exercise 7    Exercise Name 7 LTR  -AJ      Reps 7 10  -AJ      Time (Seconds) 7 10\" hold  -AJ      Exercise 8    Exercise Name 8 Prone alt LE  -AJ      Sets 8 2  -AJ      Reps 8 10  -AJ      Time (Seconds) 8 5\" hold  -AJ      Exercise 9    Exercise Name 9 Prone Heel squeezes  -AJ      Reps 9 20  -AJ      Time (Seconds) 9 5\" hold  -AJ      Exercise 10    Exercise Name 10 JOSE  -AJ      Time (Minutes) 10 5 minutes  -AJ        User Key  (r) = Recorded By, (t) = Taken By, (c) = Cosigned By    Initials Name Provider Type    AJ Bonnie Wiggins, PT Physical Therapist                               PT OP Goals       10/25/17 0800       PT Short Term Goals    STG Date to Achieve 11/10/17  -AJ     STG 1 I wthi HEP and have addiytions/changes by next recertification.  -AJ     STG 1 Progress Met  -AJ  " "   STG 2 Patient able to demonstrate proper log roll technique.  -     STG 2 Progress Ongoing  -     STG 3 Patient to report that the rad pain is occuring less often.  -AJ     STG 3 Progress Ongoing  -     STG 4 Patient to be more aware of posture and posture correction technique.  -     STG 4 Progress Ongoing  -     Long Term Goals    LTG Date to Achieve 11/17/17  -     LTG 1 AROM for lumbar spine all WNL, no increase in pain.  -     LTG 2 Patient able to perform 3 ways planks 10\" hold, x10 each with no increase in pain.  -     LTG 3 Patient able ot show proper lifting technique with no increase in pain from floor to waist to shoulder level.  -     LTG 4 I with final HEP.  -     LTG 5 D/C with final HEP and free 30 day fitness formula membership.  -     Time Calculation    PT Goal Re-Cert Due Date 11/10/17  -       User Key  (r) = Recorded By, (t) = Taken By, (c) = Cosigned By    Initials Name Provider Type    DAISY Wiggins PT Physical Therapist                Therapy Education       10/25/17 0800          Therapy Education    Education Details Added Seated ROT S and Bridges to HEP  -AJ      Given HEP;Symptoms/condition management;Pain management;Posture/body mechanics  -      Program Modified  -AJ      How Provided Verbal;Demonstration;Written  -AJ      Provided to Patient  -AJ      Level of Understanding Verbalized;Demonstrated  -        User Key  (r) = Recorded By, (t) = Taken By, (c) = Cosigned By    Initials Name Provider Type    DAISY Wiggins PT Physical Therapist                Time Calculation:   Start Time: 0846  Stop Time: 0931  Time Calculation (min): 45 min  Total Timed Code Minutes- PT: 45 minute(s)    Therapy Charges for Today     Code Description Service Date Service Provider Modifiers Qty    92735582768  PT THER PROC EA 15 MIN 10/25/2017 Bonnie Wiggins, PT GP 3                    Bonnie Wiggins, PT, DPT, CSCS  10/25/2017       "

## 2017-10-27 ENCOUNTER — HOSPITAL ENCOUNTER (OUTPATIENT)
Dept: PHYSICAL THERAPY | Facility: HOSPITAL | Age: 47
Setting detail: THERAPIES SERIES
Discharge: HOME | End: 2017-10-27

## 2017-10-27 DIAGNOSIS — M54.31 SCIATICA OF RIGHT SIDE: Primary | ICD-10-CM

## 2017-10-27 PROCEDURE — 97110 THERAPEUTIC EXERCISES: CPT | Performed by: PHYSICAL THERAPIST

## 2017-10-27 NOTE — THERAPY TREATMENT NOTE
"    Outpatient Physical Therapy Ortho Treatment Note  [facility]  Bonnie Wiggins, PT, DPT, CSCS       Patient Name: Adolfo Barron  : 1970  MRN: [id number]  Today's Date: 10/27/2017      Visit Date: 10/27/2017     Pt reports 0/10 pain pre treatment, 0/10 pain post treatment  Reports \"Maybe some\"% of improvement.  Attended 3/3 visits.  Insurance available: 60 visits  Next MD appt: 2017.  Recertification: 11/10/2017.    Visit Dx:    ICD-10-CM ICD-9-CM   1. Sciatica of right side M54.31 724.3       Patient Active Problem List   Diagnosis   • BMI 32.0-32.9,adult   • Pain in right testicle   • Encounter to establish care with new doctor   • Sciatica of right side   • Seasonal allergic rhinitis   • Chronic toe pain, right foot        Past Medical History:   Diagnosis Date   • Acute bronchitis    • Acute otitis media    • Acute sinusitis    • Allergic rhinitis    • Biceps femoris tendinitis    • Candidiasis of skin and nails    • Chronic allergic conjunctivitis    • Cough    • Upper respiratory infection         No past surgical history on file.          PT Ortho       10/27/17 0800    Subjective Comments    Subjective Comments Patien reports he is just sore this morning  -    Subjective Pain    Able to rate subjective pain? yes  -    Pre-Treatment Pain Level 0  -    Posture/Observations    Posture/Observations Comments No distress, still some decrease in posture.  -      10/25/17 0800    Subjective Comments    Subjective Comments Patient reprts she has some mid-back pain today, LB i feeling pretty good.  -    Subjective Pain    Able to rate subjective pain? yes  -    Pre-Treatment Pain Level 2  -    Post-Treatment Pain Level 0   \"worked out\"  -    Posture/Observations    Posture/Observations Comments No distress, poor posture  -      User Key  (r) = Recorded By, (t) = Taken By, (c) = Cosigned By    Initials Name Provider Type    DAISY Wiggins, PT " "Physical Therapist                            PT Assessment/Plan       10/27/17 0900       PT Assessment    Assessment Comments Patient had no issues with new ther ex. He had improved overall postural awareness with scapular exercises and cueing.  -AJ     PT Plan    PT Frequency 2x/week  -AJ     PT Plan Comments Add Seated Core stab next session  -AJ       User Key  (r) = Recorded By, (t) = Taken By, (c) = Cosigned By    Initials Name Provider Type    AJ Bonnie Wiggins, PT Physical Therapist                Modalities       10/27/17 0800          Ice    Patient denies application of Ice Yes  -AJ        User Key  (r) = Recorded By, (t) = Taken By, (c) = Cosigned By    Initials Name Provider Type    AJ Bonnie Wiggins, PT Physical Therapist                Exercises       10/27/17 0800          Subjective Comments    Subjective Comments Marylou reports he is just sore this morning  -AJ      Subjective Pain    Able to rate subjective pain? yes  -AJ      Pre-Treatment Pain Level 0  -AJ      Exercise 1    Exercise Name 1 Pro II s=8 L5.0  -AJ      Time (Minutes) 1 10 minutes  -AJ      Exercise 2    Exercise Name 2 B St. HS S  -AJ      Reps 2 2  -AJ      Time (Seconds) 2 30 seconds  -AJ      Exercise 3    Exercise Name 3 B sitting piriformis S with trunk ROT  -AJ      Reps 3 2  -AJ      Time (Seconds) 3 30 seconds  -AJ      Exercise 4    Exercise Name 4 BTB Rows Lo, Mid  -AJ      Reps 4 20   each  -AJ      Exercise 5    Exercise Name 5 BTB B Shoulder extensions  -AJ      Reps 5 20  -AJ      Exercise 6    Exercise Name 6 Bridges  -AJ      Reps 6 20  -AJ      Time (Seconds) 6 5\" hold  -AJ      Exercise 7    Exercise Name 7 DKTC with PBall  -AJ      Reps 7 20  -AJ      Time (Seconds) 7 3-5\" hold  -AJ      Exercise 8    Exercise Name 8 BKLL  -AJ      Sets 8 2  -AJ      Reps 8 10  -AJ      Exercise 9    Exercise Name 9 Prone alt LE ext  -AJ      Sets 9 2  -AJ      Reps 9 10  -AJ      Time (Seconds) 9 5\" hold  -AJ      " "Exercise 10    Exercise Name 10 Heel squeezes  -AJ      Reps 10 20  -AJ      Time (Seconds) 10 5\" hold  -AJ      Exercise 11    Exercise Name 11 Prone hip IR with RTB  -AJ      Reps 11 20  -AJ      Time (Seconds) 11 5\" hold  -AJ        User Key  (r) = Recorded By, (t) = Taken By, (c) = Cosigned By    Initials Name Provider Type    DAISY Wiggins, PT Physical Therapist                               PT OP Goals       10/27/17 0900       PT Short Term Goals    STG Date to Achieve 11/10/17  -AJ     STG 1 I wthi HEP and have addiytions/changes by next recertification.  -AJ     STG 1 Progress Met  -AJ     STG 2 Patient able to demonstrate proper log roll technique.  -AJ     STG 2 Progress Met  -AJ     STG 3 Patient to report that the rad pain is occuring less often.  -AJ     STG 3 Progress Ongoing  -AJ     STG 4 Patient to be more aware of posture and posture correction technique.  -AJ     STG 4 Progress Ongoing;Partially Met;Progressing  -AJ     STG 4 Progress Comments Still requires some cueing.  -AJ     Long Term Goals    LTG Date to Achieve 11/17/17  -AJ     LTG 1 AROM for lumbar spine all WNL, no increase in pain.  -AJ     LTG 2 Patient able to perform 3 ways planks 10\" hold, x10 each with no increase in pain.  -AJ     LTG 3 Patient able ot show proper lifting technique with no increase in pain from floor to waist to shoulder level.  -AJ     LTG 4 I with final HEP.  -AJ     LTG 5 D/C with final HEP and free 30 day fitness formula membership.  -AJ     Time Calculation    PT Goal Re-Cert Due Date 11/10/17  -AJ       User Key  (r) = Recorded By, (t) = Taken By, (c) = Cosigned By    Initials Name Provider Type    DAISY Wiggins, PT Physical Therapist                Therapy Education       10/27/17 0900          Therapy Education    Given HEP;Posture/body mechanics  -AJ      Program Reinforced  -DAISY      How Provided Verbal  -AJ      Provided to Patient  -AJ      Level of Understanding Verbalized  -DAISY   "      User Key  (r) = Recorded By, (t) = Taken By, (c) = Cosigned By    Initials Name Provider Type    AJ Bonnie Wiggins PT Physical Therapist                Time Calculation:   Start Time: 0850  Stop Time: 0933  Time Calculation (min): 43 min  Total Timed Code Minutes- PT: 43 minute(s)    Therapy Charges for Today     Code Description Service Date Service Provider Modifiers Qty    25770984775 HC PT THER PROC EA 15 MIN 10/27/2017 Bonnie Wiggins, PT GP 3                    Bonnie Wiggins PT, DPT, CSCS  10/27/2017

## 2017-11-01 ENCOUNTER — HOSPITAL ENCOUNTER (OUTPATIENT)
Dept: PHYSICAL THERAPY | Facility: HOSPITAL | Age: 47
Setting detail: THERAPIES SERIES
Discharge: HOME | End: 2017-11-01

## 2017-11-01 DIAGNOSIS — M54.31 SCIATICA OF RIGHT SIDE: Primary | ICD-10-CM

## 2017-11-01 PROCEDURE — 97110 THERAPEUTIC EXERCISES: CPT | Performed by: PHYSICAL THERAPIST

## 2017-11-01 NOTE — THERAPY TREATMENT NOTE
"    Outpatient Physical Therapy Ortho Treatment Note  [facility]  Bonnie Wiggins, PT, DPT, CSCS       Patient Name: Adolfo Barron  : 1970  MRN: [id number]  Today's Date: 2017      Visit Date: 2017     Pt reports 0/10 pain pre treatment,0 /10 pain post treatment  Reports \"Some, not sure how much\"% of improvement.  Attended 4/4 visits.  Insurance available: 60 visits  Next MD appt: 2017.  Recertification: 11/10/2017.    Visit Dx:    ICD-10-CM ICD-9-CM   1. Sciatica of right side M54.31 724.3       Patient Active Problem List   Diagnosis   • BMI 32.0-32.9,adult   • Pain in right testicle   • Encounter to establish care with new doctor   • Sciatica of right side   • Seasonal allergic rhinitis   • Chronic toe pain, right foot        Past Medical History:   Diagnosis Date   • Acute bronchitis    • Acute otitis media    • Acute sinusitis    • Allergic rhinitis    • Biceps femoris tendinitis    • Candidiasis of skin and nails    • Chronic allergic conjunctivitis    • Cough    • Upper respiratory infection         No past surgical history on file.          PT Ortho       17 0800    Subjective Pain    Able to rate subjective pain? yes  -    Pre-Treatment Pain Level 0  -    Post-Treatment Pain Level 0  -    Posture/Observations    Posture/Observations Comments No distress, improving postural awareness.  -      User Key  (r) = Recorded By, (t) = Taken By, (c) = Cosigned By    Initials Name Provider Type    DAISY Wiggins, PT Physical Therapist                  PT Assessment/Plan       17 0800       PT Assessment    Assessment Comments Patient did well with new ther ex. He had good overall postural awareness on seated pball activities.  -     PT Plan    PT Frequency 2x/week  -     PT Plan Comments Add Planks next session  -       User Key  (r) = Recorded By, (t) = Taken By, (c) = Cosigned By    Initials Name Provider Type    DAISY Nicole" "Jeovanny Wiggins PT Physical Therapist                Modalities       11/01/17 0800          Ice    Patient denies application of Ice Yes  -AJ        User Key  (r) = Recorded By, (t) = Taken By, (c) = Cosigned By    Initials Name Provider Type    DAISY Wiggins PT Physical Therapist                Exercises       11/01/17 0800          Subjective Comments    Subjective Comments Patient reports just usual soreness today.  -AJ      Subjective Pain    Able to rate subjective pain? yes  -AJ      Pre-Treatment Pain Level 0  -AJ      Post-Treatment Pain Level 0  -AJ      Exercise 1    Exercise Name 1 Pro II LE s=8  -AJ      Time (Minutes) 1 10 minutes  -AJ      Additional Comments L 5.5  -AJ      Exercise 2    Exercise Name 2 B St. HS S  -AJ      Reps 2 2  -AJ      Time (Seconds) 2 30 seconds  -AJ      Exercise 3    Exercise Name 3 B sitting piriformis S with trunk ROT  -AJ      Reps 3 2  -AJ      Time (Seconds) 3 30 seconds  -AJ      Exercise 4    Exercise Name 4 BTB Rows Lo, Mid  -AJ      Reps 4 20  -AJ      Exercise 5    Exercise Name 5 BTB B Shoulder extensions  -AJ      Reps 5 20  -AJ      Exercise 6    Exercise Name 6 Seated PBall S/S, Circles: cw/ccw, alt marching, alt LAQ  -AJ      Reps 6 20   each  -AJ      Exercise 7    Exercise Name 7 Bridges over PBall  -AJ      Sets 7 2  -AJ      Reps 7 10  -AJ      Time (Seconds) 7 5\" hold  -AJ      Exercise 8    Exercise Name 8 BKLL  -AJ      Sets 8 2  -AJ      Reps 8 10  -AJ      Exercise 9    Exercise Name 9 Heel Squeezes  -AJ      Reps 9 20  -AJ      Time (Seconds) 9 5\" hold  -AJ      Exercise 10    Exercise Name 10 Prone hip IR with RTB  -AJ      Reps 10 20  -AJ      Time (Seconds) 10 5\" hold  -AJ      Exercise 11    Exercise Name 11 Prone alt LE hip ext  -AJ      Sets 11 2  -AJ      Reps 11 10  -AJ      Time (Seconds) 11 5\" hold  -AJ        User Key  (r) = Recorded By, (t) = Taken By, (c) = Cosigned By    Initials Name Provider Type    DAISY Wiggins" "PT Physical Therapist                               PT OP Goals       11/01/17 0800       PT Short Term Goals    STG Date to Achieve 11/10/17  -AJ     STG 1 I wthi HEP and have addiytions/changes by next recertification.  -AJ     STG 1 Progress Met  -AJ     STG 2 Patient able to demonstrate proper log roll technique.  -AJ     STG 2 Progress Met  -AJ     STG 3 Patient to report that the rad pain is occuring less often.  -AJ     STG 3 Progress Met  -AJ     STG 4 Patient to be more aware of posture and posture correction technique.  -AJ     STG 4 Progress Met  -AJ     Long Term Goals    LTG Date to Achieve 11/17/17  -AJ     LTG 1 AROM for lumbar spine all WNL, no increase in pain.  -AJ     LTG 1 Progress Ongoing  -AJ     LTG 2 Patient able to perform 3 ways planks 10\" hold, x10 each with no increase in pain.  -AJ     LTG 2 Progress Ongoing  -AJ     LTG 3 Patient able ot show proper lifting technique with no increase in pain from floor to waist to shoulder level.  -AJ     LTG 3 Progress Ongoing  -AJ     LTG 4 I with final HEP.  -AJ     LTG 4 Progress Ongoing  -AJ     LTG 5 D/C with final HEP and free 30 day fitness formula membership.  -     LTG 5 Progress Ongoing  -AJ     Time Calculation    PT Goal Re-Cert Due Date 11/10/17  -DAISY       User Key  (r) = Recorded By, (t) = Taken By, (c) = Cosigned By    Initials Name Provider Type    DAISY Wiggins, PT Physical Therapist                Therapy Education       11/01/17 0800          Therapy Education    Given HEP;Posture/body mechanics  -DAISY      Program Reinforced  -DAISY      How Provided Verbal  -DAISY      Provided to Patient  -DAISY      Level of Understanding Verbalized  -DAISY        User Key  (r) = Recorded By, (t) = Taken By, (c) = Cosigned By    Initials Name Provider Type    DAISY Wiggins, PT Physical Therapist                Time Calculation:   Start Time: 0847  Stop Time: 0932  Time Calculation (min): 45 min  Total Timed Code Minutes- PT: 45 " minute(s)    Therapy Charges for Today     Code Description Service Date Service Provider Modifiers Qty    89200308496 HC PT THER PROC EA 15 MIN 11/1/2017 Bonnie Wiggins, PT GP 3                    Bonnie Wiggins PT, DPT, CSCS  11/1/2017

## 2017-11-03 ENCOUNTER — HOSPITAL ENCOUNTER (OUTPATIENT)
Dept: PHYSICAL THERAPY | Facility: HOSPITAL | Age: 47
Setting detail: THERAPIES SERIES
Discharge: HOME | End: 2017-11-03

## 2017-11-03 ENCOUNTER — OFFICE VISIT (OUTPATIENT)
Dept: FAMILY MEDICINE CLINIC | Facility: CLINIC | Age: 47
End: 2017-11-03

## 2017-11-03 VITALS
HEIGHT: 70 IN | DIASTOLIC BLOOD PRESSURE: 78 MMHG | WEIGHT: 227.9 LBS | BODY MASS INDEX: 32.63 KG/M2 | HEART RATE: 80 BPM | OXYGEN SATURATION: 99 % | SYSTOLIC BLOOD PRESSURE: 120 MMHG | TEMPERATURE: 98.2 F

## 2017-11-03 DIAGNOSIS — M54.31 SCIATICA OF RIGHT SIDE: Primary | ICD-10-CM

## 2017-11-03 DIAGNOSIS — J30.1 CHRONIC SEASONAL ALLERGIC RHINITIS DUE TO POLLEN: Primary | ICD-10-CM

## 2017-11-03 DIAGNOSIS — M54.31 SCIATICA OF RIGHT SIDE: ICD-10-CM

## 2017-11-03 DIAGNOSIS — N50.811 PAIN IN RIGHT TESTICLE: ICD-10-CM

## 2017-11-03 PROCEDURE — 97110 THERAPEUTIC EXERCISES: CPT | Performed by: PHYSICAL THERAPIST

## 2017-11-03 PROCEDURE — 99214 OFFICE O/P EST MOD 30 MIN: CPT | Performed by: FAMILY MEDICINE

## 2017-11-03 NOTE — PROGRESS NOTES
Subjective   Adolfo Barron is a 46 y.o. obese male non-smoker with seasonal Allergies, R sciatica, see PMH/PSH. Pt presents for exam, to discuss Tx and F/u plan.   ' Continues with Physical therapy, Pain is 0/10 when not working. R back pain shooing into R testicle seems to come back with working, lifting etc. Has changed duties at work, has less stress on back, but pain comes back to 6/10. Would like to prevent pain from coming back. Eyes have been itching, believe from allergies'.      Back Pain   This is a chronic problem. The current episode started more than 1 month ago. The problem occurs intermittently. The problem has been gradually improving since onset. The pain is present in the lumbar spine and gluteal. The quality of the pain is described as aching, shooting and stabbing. The pain radiates to the right thigh. The pain is at a severity of 0/10. The patient is experiencing no pain. The symptoms are aggravated by standing. Pertinent negatives include no bladder incontinence, bowel incontinence, dysuria, leg pain, numbness, paresis, paresthesias, pelvic pain, perianal numbness or weakness. (R testicle pain) Risk factors include obesity (New work environment). He has tried analgesics and NSAIDs for the symptoms. The treatment provided mild relief.   Testicle Pain   The patient's pertinent negatives include no pelvic pain, penile discharge, penile pain, scrotal swelling or testicular pain. Pertinent negatives include no constipation, coughing, diarrhea, dysuria, flank pain, frequency, shortness of breath, urgency or vomiting.   Obesity   This is a chronic problem. The current episode started more than 1 year ago. The problem occurs daily. Pertinent negatives include no congestion, coughing, diaphoresis, neck pain, numbness, vomiting or weakness. The symptoms are aggravated by bending, exertion, twisting and standing (For back pain). He has tried nothing for the symptoms. The treatment provided no relief.    Eye Problem    Both eyes are affected.This is a recurrent problem. The current episode started 1 to 4 weeks ago. The problem occurs intermittently. The patient is experiencing no pain. There is no known exposure to pink eye. Pertinent negatives include no eye discharge, eye redness, photophobia, vomiting or weakness. Treatments tried: OTC antihistamines. The treatment provided moderate relief.        The following portions of the patient's history were reviewed and updated as appropriate: allergies, current medications, past family history, past medical history, past social history, past surgical history and problem list.    Review of Systems   Constitutional: Negative for activity change, appetite change, diaphoresis and unexpected weight change.   HENT: Negative for congestion, dental problem, drooling, ear discharge, ear pain, facial swelling, hearing loss, mouth sores, nosebleeds, postnasal drip, rhinorrhea, sinus pressure, sneezing, tinnitus, trouble swallowing and voice change.    Eyes: Negative for photophobia, pain, discharge, redness, itching and visual disturbance.        Eye exam Vision works   Respiratory: Negative for apnea, cough, choking, chest tightness, shortness of breath, wheezing and stridor.    Cardiovascular: Negative for palpitations and leg swelling.   Gastrointestinal: Negative for abdominal distention, anal bleeding, blood in stool, bowel incontinence, constipation, diarrhea, rectal pain and vomiting.   Endocrine: Negative for cold intolerance, heat intolerance, polydipsia, polyphagia and polyuria.   Genitourinary: Negative for bladder incontinence, decreased urine volume, difficulty urinating, discharge, dysuria, enuresis, flank pain, frequency, genital sores, hematuria, pelvic pain, penile pain, penile swelling, scrotal swelling, testicular pain and urgency.        Pain from back shoots into R testicle.   Musculoskeletal: Negative for back pain, gait problem, neck pain and neck  stiffness.        No back pain today   Skin: Negative for color change, pallor and wound.   Allergic/Immunologic: Negative for environmental allergies, food allergies and immunocompromised state.   Neurological: Negative for dizziness, tremors, seizures, syncope, facial asymmetry, speech difficulty, weakness, light-headedness, numbness and paresthesias.   Hematological: Negative for adenopathy. Does not bruise/bleed easily.   Psychiatric/Behavioral: Negative for agitation, behavioral problems, confusion, decreased concentration, dysphoric mood, hallucinations, self-injury, sleep disturbance and suicidal ideas. The patient is not nervous/anxious and is not hyperactive.        Objective   Physical Exam   Constitutional: He is oriented to person, place, and time. He appears well-developed and well-nourished.   HENT:   Head: Normocephalic.   Right Ear: External ear normal.   Left Ear: External ear normal.   Nose: Nose normal.   Mouth/Throat: Oropharynx is clear and moist.   Conjunctiva mildly injected, tearing domenic.    Eyes: Conjunctivae and EOM are normal. Pupils are equal, round, and reactive to light. Right eye exhibits no discharge. Left eye exhibits no discharge. No scleral icterus.   Neck: Normal range of motion. Neck supple. No JVD present. No tracheal deviation present. No thyromegaly present.   Cardiovascular: Normal rate, regular rhythm, normal heart sounds and intact distal pulses.  Exam reveals no gallop and no friction rub.    No murmur heard.  Pulmonary/Chest: Effort normal and breath sounds normal. No respiratory distress. He has no wheezes. He has no rales. He exhibits no tenderness.   Abdominal: Soft. Bowel sounds are normal. He exhibits no distension and no mass. There is no tenderness. No hernia.   Musculoskeletal: Normal range of motion. He exhibits no edema, tenderness or deformity.   Lymphadenopathy:     He has no cervical adenopathy.   Neurological: He is alert and oriented to person, place, and  time. He has normal reflexes. He displays normal reflexes. No cranial nerve deficit. He exhibits normal muscle tone. Coordination normal.   Skin: Skin is warm and dry.   Psychiatric: He has a normal mood and affect. His behavior is normal. Judgment and thought content normal.   Nursing note and vitals reviewed.      Assessment/Plan      Adolfo was seen today for back pain, allergic rhinitis, testicle pain and eye problem.    Diagnoses and all orders for this visit:    Chronic seasonal allergic rhinitis due to pollen    Pain in right testicle    Sciatica of right side    BMI 32.0-32.9,adult      Discussed current health problems , meds, indications, Tx plan, rationale, discussed F/U plan. Discussed with Pt if not improving enough to tolerate pain for work, consider MRI, referral to back surgeon as indicated'.          This document has been electronically signed by Gilles Canales MD on November 3, 2017

## 2017-11-03 NOTE — THERAPY TREATMENT NOTE
Outpatient Physical Therapy Ortho Treatment Note  [facility]  Bonnie Wiggins, PT, DPT, CSCS       Patient Name: Adolfo Barron  : 1970  MRN: [id number]  Today's Date: 11/3/2017      Visit Date: 2017     Pt reports 0/10 pain pre treatment, 0/10 pain post treatment  Reports 30-35% of improvement.  Attended 5/5 visits.  Insurance available: 60 visits  Next MD appt: 2017.  Recertification: 11/10/2017.    Visit Dx:    ICD-10-CM ICD-9-CM   1. Sciatica of right side M54.31 724.3       Patient Active Problem List   Diagnosis   • BMI 32.0-32.9,adult   • Pain in right testicle   • Encounter to establish care with new doctor   • Sciatica of right side   • Seasonal allergic rhinitis   • Chronic toe pain, right foot        Past Medical History:   Diagnosis Date   • Acute bronchitis    • Acute otitis media    • Acute sinusitis    • Allergic rhinitis    • Biceps femoris tendinitis    • Candidiasis of skin and nails    • Chronic allergic conjunctivitis    • Cough    • Upper respiratory infection         History reviewed. No pertinent surgical history.          PT Ortho       17 0800    Subjective Comments    Subjective Comments Patient reports his usual soreness.  -    Subjective Pain    Able to rate subjective pain? yes  -    Pre-Treatment Pain Level 0  -    Post-Treatment Pain Level 0  -    Posture/Observations    Posture/Observations Comments No distress, improved overall posture.  -AJ      17 0800    Subjective Pain    Able to rate subjective pain? yes  -    Pre-Treatment Pain Level 0  -    Post-Treatment Pain Level 0  -    Posture/Observations    Posture/Observations Comments No distress, improving postural awareness.  -      User Key  (r) = Recorded By, (t) = Taken By, (c) = Cosigned By    Initials Name Provider Type    DAISY Wiggins, PT Physical Therapist                            PT Assessment/Plan       17 0800       PT  "Assessment    Assessment Comments Patient had excellant form with planks. Improved overall postu  -AJ     PT Plan    PT Frequency 2x/week  -AJ     PT Plan Comments Add proper lifting technique next session.  -AJ       User Key  (r) = Recorded By, (t) = Taken By, (c) = Cosigned By    Initials Name Provider Type    AJ Bonnie Wiggins, PT Physical Therapist                    Exercises       11/03/17 0800          Subjective Comments    Subjective Comments Patient reports his usual soreness.  -AJ      Subjective Pain    Able to rate subjective pain? yes  -AJ      Pre-Treatment Pain Level 0  -AJ      Post-Treatment Pain Level 0  -AJ      Exercise 1    Exercise Name 1 Pro II LE s=8  -AJ      Time (Minutes) 1 10 minutes  -AJ      Additional Comments L 6.0  -AJ      Exercise 2    Exercise Name 2 B St. HS S  -AJ      Reps 2 2  -AJ      Time (Seconds) 2 30 seconds  -AJ      Exercise 3    Exercise Name 3 B sitting piriformis S with trunk ROT  -AJ      Reps 3 2  -AJ      Time (Seconds) 3 30 seconds  -AJ      Exercise 4    Exercise Name 4 Seated PBall trunk ROT with BTB  -AJ      Reps 4 20   each side  -AJ      Exercise 5    Exercise Name 5 Seated PBall Rows: mid, Lo  -AJ      Reps 5 20   each  -AJ      Exercise 6    Exercise Name 6 Seated PBall alt marching, alt LAQ  -AJ      Reps 6 20  -AJ      Exercise 7    Exercise Name 7 Bridges over PBall  -AJ      Sets 7 2  -AJ      Reps 7 10  -AJ      Time (Seconds) 7 5\" hold  -AJ      Exercise 8    Exercise Name 8 DKTC with PBall  -AJ      Sets 8 2  -AJ      Reps 8 10  -AJ      Time (Seconds) 8 5\" hold  -AJ      Exercise 9    Exercise Name 9 Prone alt hip ext  -AJ      Sets 9 2  -AJ      Reps 9 10  -AJ      Time (Seconds) 9 5\" hold  -AJ      Exercise 10    Exercise Name 10 Prone hip IR with RTB  -AJ      Reps 10 20  -AJ      Time (Seconds) 10 5\" hold  -AJ      Exercise 11    Exercise Name 11 Prone Planks  -AJ      Reps 11 10  -AJ      Time (Seconds) 11 10\" hold  -AJ      Exercise " "12    Exercise Name 12 B SL Planks  -AJ      Reps 12 5   each side  -AJ      Time (Seconds) 12 10\" hold  -AJ        User Key  (r) = Recorded By, (t) = Taken By, (c) = Cosigned By    Initials Name Provider Type    DAISY Wiggins, PT Physical Therapist                               PT OP Goals       11/03/17 0800       PT Short Term Goals    STG Date to Achieve 11/10/17  -AJ     STG 1 I wthi HEP and have addiytions/changes by next recertification.  -AJ     STG 1 Progress Met  -AJ     STG 2 Patient able to demonstrate proper log roll technique.  -AJ     STG 2 Progress Met  -AJ     STG 3 Patient to report that the rad pain is occuring less often.  -AJ     STG 3 Progress Met  -AJ     STG 4 Patient to be more aware of posture and posture correction technique.  -AJ     STG 4 Progress Met  -AJ     Long Term Goals    LTG Date to Achieve 11/17/17  -AJ     LTG 1 AROM for lumbar spine all WNL, no increase in pain.  -AJ     LTG 1 Progress Met  -AJ     LTG 2 Patient able to perform 3 ways planks 10\" hold, x10 each with no increase in pain.  -AJ     LTG 2 Progress Ongoing;Partially Met  -AJ     LTG 2 Progress Comments Met with prone, not SL  -AJ     LTG 3 Patient able ot show proper lifting technique with no increase in pain from floor to waist to shoulder level.  -AJ     LTG 3 Progress Ongoing  -AJ     LTG 4 I with final HEP.  -AJ     LTG 4 Progress Ongoing  -AJ     LTG 5 D/C with final HEP and free 30 day fitness formula membership.  -     LTG 5 Progress Ongoing  -AJ     Time Calculation    PT Goal Re-Cert Due Date 11/10/17  -AJ       User Key  (r) = Recorded By, (t) = Taken By, (c) = Cosigned By    Initials Name Provider Type    DAISY Wiggins, PT Physical Therapist                Therapy Education       11/03/17 0800          Therapy Education    Given HEP;Posture/body mechanics;Symptoms/condition management  -AJ      Program Reinforced  -DAISY      How Provided Verbal  -AJ      Provided to Patient  -AJ      " Level of Understanding Verbalized  -DAISY        User Key  (r) = Recorded By, (t) = Taken By, (c) = Cosigned By    Initials Name Provider Type    DAISY Wiggins PT Physical Therapist                Time Calculation:   Start Time: 0805  Stop Time: 0849  Time Calculation (min): 44 min  Total Timed Code Minutes- PT: 44 minute(s)    Therapy Charges for Today     Code Description Service Date Service Provider Modifiers Qty    74635160180  PT THER PROC EA 15 MIN 11/3/2017 Bonnie Wiggins PT GP 3    75217870497 HC PT THER SUPP EA 15 MIN 11/3/2017 Bonnie Wiggins, PT GP 1                    Bonnie Wiggins PT, DPT, CSCS  11/3/2017

## 2017-11-08 ENCOUNTER — HOSPITAL ENCOUNTER (OUTPATIENT)
Dept: PHYSICAL THERAPY | Facility: HOSPITAL | Age: 47
Setting detail: THERAPIES SERIES
Discharge: HOME | End: 2017-11-08

## 2017-11-08 DIAGNOSIS — M54.31 SCIATICA OF RIGHT SIDE: Primary | ICD-10-CM

## 2017-11-08 PROCEDURE — 97110 THERAPEUTIC EXERCISES: CPT | Performed by: PHYSICAL THERAPIST

## 2017-11-08 NOTE — THERAPY TREATMENT NOTE
Outpatient Physical Therapy Ortho Treatment Note  [facility]  Bonnie Wiggins, PT, DPT, CSCS       Patient Name: Adolfo Barron  : 1970  MRN: [id number]  Today's Date: 2017      Visit Date: 2017     Pt reports0 /10 pain pre treatment, 4/10 pain post treatment  Reports 30-35% of improvement.  Attended 6/6 visits.  Insurance available: 60 visits  Next MD appt: LILI .  Recertification: 11/10/2017.    Visit Dx:    ICD-10-CM ICD-9-CM   1. Sciatica of right side M54.31 724.3       Patient Active Problem List   Diagnosis   • BMI 32.0-32.9,adult   • Pain in right testicle   • Encounter to establish care with new doctor   • Sciatica of right side   • Seasonal allergic rhinitis   • Chronic toe pain, right foot        Past Medical History:   Diagnosis Date   • Acute bronchitis    • Acute otitis media    • Acute sinusitis    • Allergic rhinitis    • Biceps femoris tendinitis    • Candidiasis of skin and nails    • Chronic allergic conjunctivitis    • Cough    • Upper respiratory infection         No past surgical history on file.          PT Ortho       17 09    Subjective Comments    Subjective Comments Patient reports that he has trouble mainly with his low back at night waking him up. he reports that his leg doesn't bother him.  -DAISY    Subjective Pain    Able to rate subjective pain? yes  -DAISY    Pre-Treatment Pain Level 0  -DAISY    Posture/Observations    Posture/Observations Comments No distress, good overall postural awareness.  -DAISY      User Key  (r) = Recorded By, (t) = Taken By, (c) = Cosigned By    Initials Name Provider Type    DAISY Wiggins, PT Physical Therapist                            PT Assessment/Plan       17 09       PT Assessment    Assessment Comments Required some instruction for proper lifting technique but able to do so after instruction. Excellant form with planks and QP exercise.  -DAISY     PT Plan    PT Frequency 2x/week   "-AJ     PT Plan Comments progress Core strength. Add Cybex lumbar ext. Recert next week with primary PT.  -AJ       User Key  (r) = Recorded By, (t) = Taken By, (c) = Cosigned By    Initials Name Provider Type    DAISY Wiggins, PT Physical Therapist                Modalities       11/08/17 0900          Ice    Patient denies application of Ice Yes  -AJ        User Key  (r) = Recorded By, (t) = Taken By, (c) = Cosigned By    Initials Name Provider Type    DAISY Wiggins, PT Physical Therapist                Exercises       11/08/17 0900          Subjective Comments    Subjective Comments Patient reports that he has trouble mainly with his low back at night waking him up. he reports that his leg doesn't bother him.  -AJ      Subjective Pain    Able to rate subjective pain? yes  -AJ      Pre-Treatment Pain Level 0  -AJ      Post-Treatment Pain Level 4  -AJ      Exercise 1    Exercise Name 1 Pro II LE s=8  -AJ      Time (Minutes) 1 10 minutes  -AJ      Additional Comments L 6.0  -AJ      Exercise 2    Exercise Name 2 B St. HS S  -AJ      Reps 2 2  -AJ      Time (Seconds) 2 30 seconds  -AJ      Exercise 3    Exercise Name 3 B sitting piriformis S with trunk ROT  -AJ      Reps 3 2  -AJ      Time (Seconds) 3 30 seconds  -AJ      Exercise 4    Exercise Name 4 LTR  -AJ      Reps 4 10  -AJ      Time (Seconds) 4 10\" hold  -AJ      Exercise 5    Exercise Name 5 Bridges over P.Ball  -AJ      Reps 5 20  -AJ      Time (Seconds) 5 5\" hold  -AJ      Exercise 6    Exercise Name 6 QP alt LE  -AJ      Sets 6 2  -AJ      Reps 6 10  -AJ      Time (Seconds) 6 5\" hold  -AJ      Exercise 7    Exercise Name 7 Prone Planks  -AJ      Reps 7 10  -AJ      Time (Seconds) 7 10\" hold  -AJ      Exercise 8    Exercise Name 8 SL Planks  -AJ      Reps 8 10  -AJ      Time (Seconds) 8 5\" hold  -AJ      Exercise 9    Exercise Name 9 Floor to waist lift @ lift station  -AJ      Reps 9 5  -AJ      Additional Comments 10#  -AJ        User " "Key  (r) = Recorded By, (t) = Taken By, (c) = Cosigned By    Initials Name Provider Type    DAISY Wiggins, PT Physical Therapist                               PT OP Goals       11/08/17 0900       PT Short Term Goals    STG Date to Achieve 11/10/17  -AJ     STG 1 I wthi HEP and have addiytions/changes by next recertification.  -AJ     STG 1 Progress Met  -AJ     STG 2 Patient able to demonstrate proper log roll technique.  -AJ     STG 2 Progress Met  -AJ     STG 3 Patient to report that the rad pain is occuring less often.  -AJ     STG 3 Progress Met  -AJ     STG 4 Patient to be more aware of posture and posture correction technique.  -AJ     STG 4 Progress Met  -AJ     Long Term Goals    LTG Date to Achieve 11/17/17  -AJ     LTG 1 AROM for lumbar spine all WNL, no increase in pain.  -AJ     LTG 1 Progress Met  -AJ     LTG 2 Patient able to perform 3 ways planks 10\" hold, x10 each with no increase in pain.  -AJ     LTG 2 Progress Met  -AJ     LTG 3 Patient able ot show proper lifting technique with no increase in pain from floor to waist to shoulder level.  -AJ     LTG 3 Progress Ongoing;Partially Met  -AJ     LTG 3 Progress Comments With some instruction today.  -AJ     LTG 4 I with final HEP.  -AJ     LTG 4 Progress Ongoing  -AJ     LTG 5 D/C with final HEP and free 30 day fitness formula membership.  -     LTG 5 Progress Ongoing  -AJ     Time Calculation    PT Goal Re-Cert Due Date 11/10/17  -       User Key  (r) = Recorded By, (t) = Taken By, (c) = Cosigned By    Initials Name Provider Type    DAISY Wiggins, PT Physical Therapist                Therapy Education       11/08/17 0900          Therapy Education    Given HEP;Posture/body mechanics;Symptoms/condition management  -      Program Reinforced  -DAISY      How Provided Verbal;Demonstration  -AJ      Provided to Patient  -AJ      Level of Understanding Verbalized;Demonstrated  -AJ        User Key  (r) = Recorded By, (t) = Taken By, " (c) = Cosigned By    Initials Name Provider Type    DAISY Wiggins PT Physical Therapist                Time Calculation:   Start Time: 0850  Stop Time: 0936  Time Calculation (min): 46 min  Total Timed Code Minutes- PT: 46 minute(s)    Therapy Charges for Today     Code Description Service Date Service Provider Modifiers Qty    10263435162 HC PT THER PROC EA 15 MIN 11/8/2017 Bonnie Wiggins, PT GP 3                    Bonnie Wiggins PT, DPT, CSCS  11/8/2017

## 2017-11-10 ENCOUNTER — HOSPITAL ENCOUNTER (OUTPATIENT)
Dept: PHYSICAL THERAPY | Facility: HOSPITAL | Age: 47
Setting detail: THERAPIES SERIES
Discharge: HOME | End: 2017-11-10

## 2017-11-10 DIAGNOSIS — M54.31 SCIATICA OF RIGHT SIDE: Primary | ICD-10-CM

## 2017-11-10 PROCEDURE — G0283 ELEC STIM OTHER THAN WOUND: HCPCS | Performed by: PHYSICAL THERAPIST

## 2017-11-10 PROCEDURE — 97110 THERAPEUTIC EXERCISES: CPT | Performed by: PHYSICAL THERAPIST

## 2017-11-10 NOTE — THERAPY TREATMENT NOTE
Outpatient Physical Therapy Ortho Treatment Note  [facility]     Patient Name: Adolfo Barron  : 1970  MRN: [id number]  Today's Date: 11/10/2017      Visit Date: 11/10/2017  Pt reports 9/10 pain pre treatment, 6/10 pain post treatment  Reports 30-35% of improvement.  Attended 7/7 visits.  Insurance available: 60 visits  Next MD appt: TBQIAN .  Recertification: 11/10/2017.  Visit Dx:    ICD-10-CM ICD-9-CM   1. Sciatica of right side M54.31 724.3       Patient Active Problem List   Diagnosis   • BMI 32.0-32.9,adult   • Pain in right testicle   • Encounter to establish care with new doctor   • Sciatica of right side   • Seasonal allergic rhinitis   • Chronic toe pain, right foot        Past Medical History:   Diagnosis Date   • Acute bronchitis    • Acute otitis media    • Acute sinusitis    • Allergic rhinitis    • Biceps femoris tendinitis    • Candidiasis of skin and nails    • Chronic allergic conjunctivitis    • Cough    • Upper respiratory infection         No past surgical history on file.          PT Ortho       11/10/17 0900    Posture/Observations    Posture/Observations Comments No distress, good overall postural awareness.  -BS      17 0900    Subjective Comments    Subjective Comments Patient reports that he has trouble mainly with his low back at night waking him up. he reports that his leg doesn't bother him.  -DAISY    Subjective Pain    Able to rate subjective pain? yes  -DAISY    Pre-Treatment Pain Level 0  -DAISY    Posture/Observations    Posture/Observations Comments No distress, good overall postural awareness.  -DAISY      User Key  (r) = Recorded By, (t) = Taken By, (c) = Cosigned By    Initials Name Provider Type    DAISY Wiggins, PT Physical Therapist    BS Mark Cochran, PT Physical Therapist                            PT Assessment/Plan       11/10/17 1200       PT Assessment    Assessment Comments reduced R LE radicular symptoms with prone lying on 2  "pillows, exacerbated L LE sciatica with prone press ups. Centralize R LE radicular symptoms before adding high level core stability ex's  -BS     Please refer to paper survey for additional self-reported information No  -BS     PT Plan    PT Frequency 2x/week  -BS     PT Plan Comments review prone progression to centralize R LE radicular symptoms.  -BS       User Key  (r) = Recorded By, (t) = Taken By, (c) = Cosigned By    Initials Name Provider Type    RICO Cochran, PT Physical Therapist                Modalities       11/10/17 0900          Ice    Ice Applied Yes  -BS      Location lumbar spine-prone  -BS      Rx Minutes 15 mins  -BS      Ice S/P Rx Yes  -BS      ELECTRICAL STIMULATION    Attended/Unattended Unattended  -BS      Stimulation Type IFC  -BS      Location/Electrode Placement/Other --   lumbar spine  -BS      Rx Minutes 15 mins  -BS        User Key  (r) = Recorded By, (t) = Taken By, (c) = Cosigned By    Initials Name Provider Type    BS Mark Cochran, PT Physical Therapist                Exercises       11/10/17 0900          Subjective Comments    Subjective Comments Pt reports severe central LBP without R LE sciatica this morning, pt reports long hours at work lately.  -BS      Subjective Pain    Able to rate subjective pain? yes  -BS      Pre-Treatment Pain Level 9  -BS      Post-Treatment Pain Level 6  -BS      Exercise 1    Exercise Name 1 Pro II LE s=8  -BS      Time (Minutes) 1 10 minutes  -BS      Additional Comments L 5.0  -BS      Exercise 2    Exercise Name 2 prone on elbows  -BS      Time (Minutes) 2 5'  -BS      Exercise 3    Exercise Name 3 prone lying on 2 pillows  -BS      Time (Minutes) 3 5'  -BS      Exercise 4    Exercise Name 4 supine piriformis stretch  -BS      Sets 4 1  -BS      Reps 4 3  -BS      Time (Seconds) 4 20\" hold  -BS      Exercise 5    Exercise Name 5 prone press ups  -BS      Sets 5 1  -BS      Reps 5 2  -BS      Time (Seconds) 5 increased R LE sciatica  -BS      " "Exercise 6    Exercise Name 6 pt ed sitting posture  -BS      Time (Minutes) 6 5'  -BS      Exercise 7    Exercise Name 7 supine pelvic tilts  -BS      Sets 7 1  -BS      Reps 7 20  -BS        User Key  (r) = Recorded By, (t) = Taken By, (c) = Cosigned By    Initials Name Provider Type    RICO Cochran, PT Physical Therapist                               PT OP Goals       11/10/17 1200 11/10/17 0900    PT Short Term Goals    STG Date to Achieve 11/10/17  -BS     STG 1 I wthi HEP and have addiytions/changes by next recertification.  -BS     STG 1 Progress Met  -BS     STG 2 Patient able to demonstrate proper log roll technique.  -BS     STG 2 Progress Met  -BS     STG 3 Patient to report that the rad pain is occuring less often.  -BS     STG 3 Progress Met  -BS     STG 4 Patient to be more aware of posture and posture correction technique.  -BS     STG 4 Progress Met  -BS     Long Term Goals    LTG Date to Achieve 11/17/17  -BS     LTG 1 AROM for lumbar spine all WNL, no increase in pain.  -BS     LTG 1 Progress Met  -BS     LTG 2 Patient able to perform 3 ways planks 10\" hold, x10 each with no increase in pain.  -BS     LTG 2 Progress Met  -BS     LTG 3 Patient able ot show proper lifting technique with no increase in pain from floor to waist to shoulder level.  -BS     LTG 3 Progress Ongoing;Partially Met  -BS     LTG 4 I with final HEP.  -BS     LTG 4 Progress Ongoing  -BS     LTG 5 D/C with final HEP and free 30 day fitness formula membership.  -BS     LTG 5 Progress Ongoing  -BS     Time Calculation    PT Goal Re-Cert Due Date  11/10/17  -BS      User Key  (r) = Recorded By, (t) = Taken By, (c) = Cosigned By    Initials Name Provider Type    RICO Cochran, PT Physical Therapist                Therapy Education       11/10/17 1200          Therapy Education    Given HEP;Posture/body mechanics;Symptoms/condition management  -BS      Program New  -BS      How Provided Verbal;Demonstration  -BS      Provided " to Patient  -BS      Level of Understanding Verbalized;Demonstrated  -BS        User Key  (r) = Recorded By, (t) = Taken By, (c) = Cosigned By    Initials Name Provider Type    BS Mark Cochran, PT Physical Therapist                Time Calculation:   Start Time: 0855  Stop Time: 0945  Time Calculation (min): 50 min    Therapy Charges for Today     Code Description Service Date Service Provider Modifiers Qty    58638148006  PT THER PROC EA 15 MIN 11/10/2017 Mark Cochran, PT GP 2    [id number]  PT ELECTRICAL STIM UNATTENDED 11/10/2017 Mark Cochran, PT  1                    Mark Cochran, PT  11/10/2017

## 2017-11-15 ENCOUNTER — HOSPITAL ENCOUNTER (OUTPATIENT)
Dept: PHYSICAL THERAPY | Facility: HOSPITAL | Age: 47
Setting detail: THERAPIES SERIES
Discharge: HOME | End: 2017-11-15

## 2017-11-15 DIAGNOSIS — M54.31 SCIATICA OF RIGHT SIDE: Primary | ICD-10-CM

## 2017-11-15 PROCEDURE — 97110 THERAPEUTIC EXERCISES: CPT | Performed by: PHYSICAL THERAPIST

## 2017-11-15 NOTE — THERAPY PROGRESS REPORT/RE-CERT
Outpatient Physical Therapy Ortho Progress Note  [facility]  Bonnie Wiggins, PT, DPT, CSCS       Patient Name: Adolfo Barron  : 1970  MRN: [id number]  Today's Date: 11/15/2017      Visit Date: 11/15/2017     Pt reports 9/10 pain pre treatment, 8/10 pain post treatment  Reports 30-35% of improvement.  Attended 8/8 visits.  Insurance available: 60 visits  Next MD appt: TBD .  Recertification: N/A    Patient Active Problem List   Diagnosis   • BMI 32.0-32.9,adult   • Pain in right testicle   • Encounter to establish care with new doctor   • Sciatica of right side   • Seasonal allergic rhinitis   • Chronic toe pain, right foot        Past Medical History:   Diagnosis Date   • Acute bronchitis    • Acute otitis media    • Acute sinusitis    • Allergic rhinitis    • Biceps femoris tendinitis    • Candidiasis of skin and nails    • Chronic allergic conjunctivitis    • Cough    • Upper respiratory infection         History reviewed. No pertinent surgical history.    Visit Dx:     ICD-10-CM ICD-9-CM   1. Sciatica of right side M54.31 724.3     Number of days off work: None     Changes to medications: None noted.    Changes to MD orders: None noted.          PT Ortho       11/15/17 0900    Subjective Comments    Subjective Comments Patient reports he has been working more hours so his pain has been up. He reports overall still feeling pretty good. Patient reports he's still having some tingling in the leg.  -AJ    DTR- Lower Quarter Clearing    Patellar tendon (L2-4) Bilateral:;2- Normal response  -AJ    Achilles tendon (S1-2) Bilateral:;2- Normal response  -AJ    Lumbar/SI Special Tests    Standing Flexion Test (SI Dysfunction) Bilateral:;Negative  -AJ    Stork Test (SI Dysfunction) Bilateral:;Negative  -AJ    Trendelenburg Test (Gluteus Medius Weakness) Bilateral:;Negative  -AJ    Slump Test (Neural Tension) Bilateral:;Negative  -AJ    Lashell Raoul Test (HNP) Negative  -AJ    SLR  (Neural Tension) Bilateral:;Negative  -AJ    SI Compression Test (SI Dysfunction) Bilateral:;Negative  -AJ    SI Distraction Test (SI Dysfunction) Bilateral:;Negative  -AJ    ONEIDA (hip vs. SI Dysfunction) Bilateral:;Negative  -AJ    FAIR Test (Piriformis Syndrome) Bilateral:;Negative  -AJ    Sacral Spring Test (SI Dysfunction) Negative  -AJ    ROM (Range of Motion)    General ROM Detail Level pelvis, no LLD to note.  -AJ    Trunk    Flexion AROM --   95°  -AJ    Extension AROM --   28°  -AJ    Left Lateral Flexion AROM WNL (0-35 degrees)  -AJ    Right Lateral Flexion AROM WNL (0-35 degrees)  -AJ    Left Rotation AROM WNL (0-45 degrees)  -AJ    Right Rotation AROM WNL (0-45 degrees)  -AJ    MMT (Manual Muscle Testing)    General MMT Assessment Detail B LE 5/5, Core 5/5  -AJ    Lower Extremity Flexibility    Hamstrings Bilateral:;Mildly limited  -AJ    LE Other Flexibility Bilateral:;Mildly limited   piriformis  -AJ    Pathomechanics    Spine Pathomechanics Hinges into extension at one segment in lumbar;Bends knees with attempted lumbar extension  -AJ    Transfers    Transfer, Comment I with all transfers with good log roll technique.  -AJ      User Key  (r) = Recorded By, (t) = Taken By, (c) = Cosigned By    Initials Name Provider Type    DAISY Wiggins PT Physical Therapist                  Therapy Education       11/15/17 0900          Therapy Education    Given HEP;Posture/body mechanics;Symptoms/condition management  -AJ      Program Reinforced  -AJ      How Provided Verbal;Demonstration  -AJ      Provided to Patient  -AJ      Level of Understanding Verbalized;Demonstrated  -AJ        User Key  (r) = Recorded By, (t) = Taken By, (c) = Cosigned By    Initials Name Provider Type    DAISY Wiggins PT Physical Therapist                PT OP Goals       11/15/17 0901 11/15/17 0900    PT Short Term Goals    STG Date to Achieve 11/22/17  -AJ 11/10/17  -AJ    STG 1 I with final HEP and symptoms  "management.  -AJ I wthi HEP and have addiytions/changes by next recertification.  -AJ    STG 1 Progress  Met  -AJ    STG 2 D/C with final HEp and free 30 day fitness formula membership.  -AJ Patient able to demonstrate proper log roll technique.  -AJ    STG 2 Progress  Met  -AJ    STG 3  Patient to report that the rad pain is occuring less often.  -AJ    STG 3 Progress  Met  -AJ    STG 4  Patient to be more aware of posture and posture correction technique.  -AJ    STG 4 Progress  Met  -AJ    Long Term Goals    LTG Date to Achieve  11/17/17  -AJ    LTG 1  AROM for lumbar spine all WNL, no increase in pain.  -AJ    LTG 1 Progress  Met  -AJ    LTG 2  Patient able to perform 3 ways planks 10\" hold, x10 each with no increase in pain.  -AJ    LTG 2 Progress  Met  -AJ    LTG 3  Patient able ot show proper lifting technique with no increase in pain from floor to waist to shoulder level.  -AJ    LTG 3 Progress  Met  -AJ    LTG 4  I with final HEP.  -AJ    LTG 4 Progress  Ongoing;Partially Met  -AJ    LTG 5  D/C with final HEP and free 30 day fitness formula membership.  -AJ    LTG 5 Progress  Ongoing;Not Met  -AJ    Time Calculation    PT Goal Re-Cert Due Date --   N/A  -AJ       User Key  (r) = Recorded By, (t) = Taken By, (c) = Cosigned By    Initials Name Provider Type    DAISY Wiggins, PT Physical Therapist         Barriers to Rehab: Include significant or possible arthritic/degenerative changes that have occurred within the spine.    Safety Issues: None noted.          PT Assessment/Plan       11/15/17 0900       PT Assessment    Functional Limitations Limitation in home management;Limitations in community activities;Performance in leisure activities;Performance in work activities  -AJ     Impairments Endurance;Impaired muscle endurance;Impaired muscle length;Pain  -AJ     Assessment Comments Patient has met all goals, but still needs some instruction for symptoms management.  -AJ     Rehab Potential Good  " -AJ     Patient/caregiver participated in establishment of treatment plan and goals Yes  -AJ     Patient would benefit from skilled therapy intervention No  -AJ     PT Plan    PT Frequency 2x/week  -AJ     Predicted Duration of Therapy Intervention (days/wks) 1 week, D/C at next visit  -AJ     Planned CPT's? PT THER PROC EA 15 MIN: 79816;PT THER ACT EA 15 MIN: 88040;PT THER SUPP EA 15 MIN  -AJ     Physical Therapy Interventions (Optional Details) stretching;ROM (Range of Motion);modalities;swiss ball techniques;patient/family education;postural re-education  -AJ     PT Plan Comments Review strengthening and symptom management.  -AJ       User Key  (r) = Recorded By, (t) = Taken By, (c) = Cosigned By    Initials Name Provider Type    DAISY Wiggins PT Physical Therapist       Other therapeutic activities and/or exercises will be prescribed depending on the patients progress or lack there of.          Modalities       11/15/17 0900          Ice    Patient denies application of Ice Yes  -AJ      Patient reports will apply ice at home to involved area Yes  -AJ        User Key  (r) = Recorded By, (t) = Taken By, (c) = Cosigned By    Initials Name Provider Type    DAISY Wiggins, PT Physical Therapist              Exercises       11/15/17 0900          Subjective Comments    Subjective Comments Patient reports he has been working more hours so his pain has been up. He reports overall still feeling pretty good. Patient reports he's still having some tingling in the leg.  -AJ      Subjective Pain    Able to rate subjective pain? yes  -AJ      Pre-Treatment Pain Level 9  -AJ      Post-Treatment Pain Level 8  -AJ      Exercise 1    Exercise Name 1 Pro II LE s=8  -AJ      Time (Minutes) 1 10 minutes  -AJ      Additional Comments L 5.5  -AJ      Exercise 2    Exercise Name 2 B supine piriformis S  -AJ      Reps 2 2  -AJ      Time (Seconds) 2 30 seconds  -AJ      Exercise 3    Exercise Name 3 SKTC S  -AJ       "Reps 3 2  -AJ      Time (Seconds) 3 30 seconds  -AJ      Exercise 4    Exercise Name 4 DKTC S  -AJ      Reps 4 2  -AJ      Time (Seconds) 4 30 seconds  -AJ      Exercise 5    Exercise Name 5 LTR  -AJ      Reps 5 10  -AJ      Time (Seconds) 5 10\" hold  -AJ      Exercise 6    Exercise Name 6 St. mild lumbar ext  -AJ      Reps 6 5  -AJ      Time (Seconds) 6 5\" hold  -AJ      Exercise 7    Exercise Name 7 St. lumbar flexion   -AJ      Reps 7 10  -AJ      Exercise 8    Exercise Name 8 Seated PBall S/S, circles: cw/ccw; alt marching, alt LAQ  -AJ      Reps 8 20  -AJ        User Key  (r) = Recorded By, (t) = Taken By, (c) = Cosigned By    Initials Name Provider Type    DAISY Wiggins PT Physical Therapist                              Outcome Measures       11/15/17 0900          Modified Oswestry    Modified Oswestry Score/Comments 10 = 20%  -AJ      Functional Assessment    Outcome Measure Options Modifed Owestry  -AJ        User Key  (r) = Recorded By, (t) = Taken By, (c) = Cosigned By    Initials Name Provider Type    DAISY Wiggins PT Physical Therapist            Time Calculation:   Start Time: 0851  Stop Time: 0940  Time Calculation (min): 49 min  Total Timed Code Minutes- PT: 49 minute(s)     Therapy Charges for Today     Code Description Service Date Service Provider Modifiers Qty    28414059951  PT THER PROC EA 15 MIN 11/15/2017 Bonnie Wiggins PT GP 3    58834066661 HC PT THER SUPP EA 15 MIN 11/15/2017 Bonnie Wiggins PT GP 1          PT G-Codes  Outcome Measure Options: Modifed Owestry         Bonnie Wiggins PT, DPT, CSCS  11/15/2017        "

## 2017-11-17 ENCOUNTER — HOSPITAL ENCOUNTER (OUTPATIENT)
Dept: PHYSICAL THERAPY | Facility: HOSPITAL | Age: 47
Setting detail: THERAPIES SERIES
Discharge: HOME | End: 2017-11-17

## 2017-11-17 DIAGNOSIS — M54.31 SCIATICA OF RIGHT SIDE: Primary | ICD-10-CM

## 2017-11-17 PROCEDURE — 97110 THERAPEUTIC EXERCISES: CPT | Performed by: PHYSICAL THERAPIST

## 2017-11-17 NOTE — THERAPY DISCHARGE NOTE
Outpatient Physical Therapy Ortho Treatment Note/Discharge Summary  [facility]  Bonnie Wiggins, PT, DPT, CSCS       Patient Name: Adolfo Barron  : 1970  MRN: [id number]  Today's Date: 2017      Visit Date: 2017     Pt reports 5/10 pain pre treatment,5 /10 pain post treatment  Reports 30-35% of improvement.  Attended / visits.  Insurance available: 60 visits  Next MD appt: TBD .  Recertification: N/A    Visit Dx:    ICD-10-CM ICD-9-CM   1. Sciatica of right side M54.31 724.3       Patient Active Problem List   Diagnosis   • BMI 32.0-32.9,adult   • Pain in right testicle   • Encounter to establish care with new doctor   • Sciatica of right side   • Seasonal allergic rhinitis   • Chronic toe pain, right foot        Past Medical History:   Diagnosis Date   • Acute bronchitis    • Acute otitis media    • Acute sinusitis    • Allergic rhinitis    • Biceps femoris tendinitis    • Candidiasis of skin and nails    • Chronic allergic conjunctivitis    • Cough    • Upper respiratory infection         History reviewed. No pertinent surgical history.          PT Ortho       17 0900    Subjective Pain    Able to rate subjective pain? yes  -AJ    Pre-Treatment Pain Level 5  -AJ    Post-Treatment Pain Level 5  -AJ    Posture/Observations    Posture/Observations Comments No distress, arrives 10min late  -    Transfers    Transfer, Comment I with transfers with good log roll technique.  -AJ      11/15/17 0900    Subjective Comments    Subjective Comments Patient reports he has been working more hours so his pain has been up. He reports overall still feeling pretty good. Patient reports he's still having some tingling in the leg.  -AJ    DTR- Lower Quarter Clearing    Patellar tendon (L2-4) Bilateral:;2- Normal response  -AJ    Achilles tendon (S1-2) Bilateral:;2- Normal response  -AJ    Lumbar/SI Special Tests    Standing Flexion Test (SI Dysfunction)  Bilateral:;Negative  -AJ    Stork Test (SI Dysfunction) Bilateral:;Negative  -AJ    Trendelenburg Test (Gluteus Medius Weakness) Bilateral:;Negative  -AJ    Slump Test (Neural Tension) Bilateral:;Negative  -AJ    Lashell Raoul Test (HNP) Negative  -AJ    SLR (Neural Tension) Bilateral:;Negative  -AJ    SI Compression Test (SI Dysfunction) Bilateral:;Negative  -AJ    SI Distraction Test (SI Dysfunction) Bilateral:;Negative  -AJ    ONEIDA (hip vs. SI Dysfunction) Bilateral:;Negative  -AJ    FAIR Test (Piriformis Syndrome) Bilateral:;Negative  -AJ    Sacral Spring Test (SI Dysfunction) Negative  -AJ    ROM (Range of Motion)    General ROM Detail Level pelvis, no LLD to note.  -AJ    Trunk    Flexion AROM --   95°  -AJ    Extension AROM --   28°  -AJ    Left Lateral Flexion AROM WNL (0-35 degrees)  -AJ    Right Lateral Flexion AROM WNL (0-35 degrees)  -AJ    Left Rotation AROM WNL (0-45 degrees)  -AJ    Right Rotation AROM WNL (0-45 degrees)  -AJ    MMT (Manual Muscle Testing)    General MMT Assessment Detail B LE 5/5, Core 5/5  -AJ    Lower Extremity Flexibility    Hamstrings Bilateral:;Mildly limited  -AJ    LE Other Flexibility Bilateral:;Mildly limited   piriformis  -AJ    Pathomechanics    Spine Pathomechanics Hinges into extension at one segment in lumbar;Bends knees with attempted lumbar extension  -    Transfers    Transfer, Comment I with all transfers with good log roll technique.  -      User Key  (r) = Recorded By, (t) = Taken By, (c) = Cosigned By    Initials Name Provider Type    DAISY Wiggins, PT Physical Therapist                            PT Assessment/Plan       11/17/17 0900       PT Assessment    Assessment Comments Patient did well with all ther ex. Discussed higher level ther ex for when back is not hurting. ALso encouraged follow up with physician.  -     PT Plan    PT Frequency --   N/A  -     PT Plan Comments D/C today with final HEP and free 30 day fitness formula membership.  -  "      User Key  (r) = Recorded By, (t) = Taken By, (c) = Cosigned By    Initials Name Provider Type    AJ Bonnie Wiggins, PT Physical Therapist                Modalities       11/17/17 0900          Ice    Ice Applied Yes  -AJ      Location lumbar spine-prone  -AJ      Rx Minutes 15 mins  -AJ      Ice S/P Rx Yes  -AJ      Patient denies application of Ice --  -AJ      Patient reports will apply ice at home to involved area --  -AJ        User Key  (r) = Recorded By, (t) = Taken By, (c) = Cosigned By    Initials Name Provider Type    AJ Bonnie Wiggins, PT Physical Therapist                Exercises       11/17/17 0900          Subjective Comments    Subjective Comments Patient reports he is hurting a little more today. He reports he really thinks it is his new job at work  -AJ      Subjective Pain    Able to rate subjective pain? yes  -AJ      Pre-Treatment Pain Level 5  -AJ      Post-Treatment Pain Level 5  -AJ      Exercise 1    Exercise Name 1 Pro II LE s=8  -AJ      Time (Minutes) 1 10 minutes  -AJ      Additional Comments L 5.5  -AJ      Exercise 2    Exercise Name 2 B supine piriformis S  -AJ      Reps 2 2  -AJ      Time (Seconds) 2 30 seconds  -AJ      Exercise 3    Exercise Name 3 SKTC S  -AJ      Reps 3 2  -AJ      Time (Seconds) 3 30 seconds  -AJ      Exercise 4    Exercise Name 4 DKTC S  -AJ      Reps 4 2  -AJ      Time (Seconds) 4 30 seconds  -AJ      Exercise 5    Exercise Name 5 DKTC with PBall  -AJ      Reps 5 20  -AJ      Exercise 6    Exercise Name 6 HLM  -AJ      Sets 6 2  -AJ      Reps 6 10  -AJ      Exercise 7    Exercise Name 7 Prone heel squeezes  -AJ      Reps 7 20  -AJ      Time (Seconds) 7 5\" hold  -AJ      Exercise 8    Exercise Name 8 Prone hip IR with RTB  -AJ      Reps 8 20  -AJ      Time (Seconds) 8 5\" hold  -AJ      Exercise 9    Exercise Name 9 Prone TKE with GS  -AJ      Reps 9 20  -AJ      Time (Seconds) 9 5\" hold  -AJ      Exercise 10    Exercise Name 10 LTR  -AJ      " "Reps 10 10  -AJ      Time (Seconds) 10 10\" hold  -AJ      Exercise 11    Exercise Name 11 Verbal review of higher level ther ex, lifting instructions.  -      Cueing 11 Verbal  -AJ      Exercise 12    Exercise Name 12 Log roll demonstration  -AJ      Reps 12 2  -AJ        User Key  (r) = Recorded By, (t) = Taken By, (c) = Cosigned By    Initials Name Provider Type    DAISY Wiggins, PT Physical Therapist                               PT OP Goals       11/17/17 0900       PT Short Term Goals    STG Date to Achieve 11/22/17  -AJ     STG 1 I with final HEP and symptoms management.  -AJ     STG 1 Progress Partially Met  -AJ     STG 1 Progress Comments Met I with HEP, partially I with s/s management. Recommended return to MD.  -     STG 2 D/C with final HEp and free 30 day fitness formula membership.  -     STG 2 Progress Met  -     Time Calculation    PT Goal Re-Cert Due Date --   N/A  -       User Key  (r) = Recorded By, (t) = Taken By, (c) = Cosigned By    Initials Name Provider Type    DAISY Wiggins, PT Physical Therapist                Therapy Education       11/17/17 1000          Therapy Education    Given HEP;Symptoms/condition management;Pain management;Posture/body mechanics   Final HEP  -      Program Reinforced  -AJ      How Provided Verbal;Demonstration  -AJ      Provided to Patient  -AJ      Level of Understanding Verbalized;Demonstrated  -        User Key  (r) = Recorded By, (t) = Taken By, (c) = Cosigned By    Initials Name Provider Type    DAISY Wiggins, PT Physical Therapist                Outcome Measures       11/15/17 0900          Modified Oswestry    Modified Oswestry Score/Comments 10 = 20%  -      Functional Assessment    Outcome Measure Options Modifed Owestry  -        User Key  (r) = Recorded By, (t) = Taken By, (c) = Cosigned By    Initials Name Provider Type    DAISY Wiggins PT Physical Therapist            Time Calculation:   Start " Time: 0940  Stop Time: 1041  Time Calculation (min): 61 min  PT Non-Billable Time (min): 15 min  Total Timed Code Minutes- PT: 46 minute(s)    Therapy Charges for Today     Code Description Service Date Service Provider Modifiers Qty    58647317744 HC PT THER PROC EA 15 MIN 11/17/2017 Bonnie Wiggins, PT GP 3    53998712703 HC PT THER SUPP EA 15 MIN 11/17/2017 Bonnie Wiggins PT GP 2                OP PT Discharge Summary  Date of Discharge: 11/17/17  Reason for Discharge: Maximum functional potential achieved, All goals achieved  Outcomes Achieved: Able to achieve all goals within established timeline  Discharge Destination: Home with home program  Discharge Instructions: D/C with final HEP and free 30 day fitness formula membership. ALso encouraged patient to follow-up with family physician.      Bonnie Wiggins, PT, DPT, CSCS  11/17/2017

## 2017-12-06 ENCOUNTER — OFFICE VISIT (OUTPATIENT)
Dept: FAMILY MEDICINE CLINIC | Facility: CLINIC | Age: 47
End: 2017-12-06

## 2017-12-06 VITALS
WEIGHT: 230 LBS | BODY MASS INDEX: 45.16 KG/M2 | TEMPERATURE: 99.8 F | SYSTOLIC BLOOD PRESSURE: 119 MMHG | HEART RATE: 90 BPM | DIASTOLIC BLOOD PRESSURE: 85 MMHG | HEIGHT: 60 IN | OXYGEN SATURATION: 96 %

## 2017-12-06 DIAGNOSIS — J20.9 ACUTE BRONCHITIS, UNSPECIFIED ORGANISM: Primary | ICD-10-CM

## 2017-12-06 PROCEDURE — 99213 OFFICE O/P EST LOW 20 MIN: CPT | Performed by: NURSE PRACTITIONER

## 2017-12-06 RX ORDER — FLUTICASONE PROPIONATE 50 MCG
SPRAY, SUSPENSION (ML) NASAL
Refills: 0 | COMMUNITY
Start: 2017-09-09 | End: 2018-04-20

## 2017-12-06 RX ORDER — BENZONATATE 200 MG/1
CAPSULE ORAL
Refills: 0 | COMMUNITY
Start: 2017-09-09 | End: 2017-12-06

## 2017-12-06 RX ORDER — AMOXICILLIN AND CLAVULANATE POTASSIUM 875; 125 MG/1; MG/1
TABLET, FILM COATED ORAL
Refills: 0 | COMMUNITY
Start: 2017-09-09 | End: 2017-12-06

## 2017-12-06 RX ORDER — BROMPHENIRAMINE MALEATE, PSEUDOEPHEDRINE HYDROCHLORIDE, AND DEXTROMETHORPHAN HYDROBROMIDE 2; 30; 10 MG/5ML; MG/5ML; MG/5ML
SYRUP ORAL
Refills: 0 | COMMUNITY
Start: 2017-12-02 | End: 2017-12-06

## 2017-12-06 RX ORDER — DEXTROMETHORPHAN HYDROBROMIDE AND PROMETHAZINE HYDROCHLORIDE 15; 6.25 MG/5ML; MG/5ML
5 SYRUP ORAL 4 TIMES DAILY PRN
Qty: 180 ML | Refills: 0 | Status: SHIPPED | OUTPATIENT
Start: 2017-12-06 | End: 2018-04-20

## 2017-12-06 RX ORDER — METHYLPREDNISOLONE 4 MG/1
TABLET ORAL
Qty: 1 EACH | Refills: 0 | Status: SHIPPED | OUTPATIENT
Start: 2017-12-06 | End: 2018-04-20

## 2017-12-06 NOTE — PROGRESS NOTES
"Subjective   Adolfo Barron is a 46 y.o. male.     HPI Comments: Goes by \"[employer]\"      Cough   This is a new problem. Episode onset: x 2 weeks. The problem has been unchanged. The problem occurs every few minutes. The cough is non-productive. Associated symptoms include ear congestion and headaches ( only with cough). Pertinent negatives include no chest pain, chills, ear pain ( popping, pressure), fever, heartburn, hemoptysis, myalgias, nasal congestion, postnasal drip, rash, rhinorrhea, sore throat, shortness of breath, sweats, weight loss or wheezing. The symptoms are aggravated by lying down. Risk factors for lung disease include smoking/tobacco exposure. Treatments tried: seen at [facility] on 12-2-17 and given Bromfed, allergy pill, tessalon perles--not helping--has Flonase at home, but doesn't use. There is no history of asthma, bronchitis or COPD.        The following portions of the patient's history were reviewed and updated as appropriate: allergies, current medications, past medical history, past social history, past surgical history and problem list.    Review of Systems   Constitutional: Negative for appetite change, chills, fatigue, fever and weight loss.   HENT: Negative for congestion, ear pain ( popping, pressure), postnasal drip, rhinorrhea, sinus pain, sinus pressure and sore throat.    Eyes: Negative for discharge and itching.   Respiratory: Positive for cough and chest tightness ( slight). Negative for hemoptysis, shortness of breath and wheezing.    Cardiovascular: Negative for chest pain.   Gastrointestinal: Negative for heartburn.   Musculoskeletal: Negative for myalgias, neck pain and neck stiffness.   Skin: Negative for rash.   Neurological: Positive for headaches ( only with cough). Negative for dizziness.   Hematological: Negative for adenopathy.       Objective    /85 (BP Location: Left arm, Patient Position: Sitting, Cuff Size: Adult)  Pulse 90  Temp 99.8 °F (37.7 °C) " "(Tympanic)   Ht 70 cm (27.56\")  Wt 104 kg (230 lb)  SpO2 96%  .91 kg/m2    Physical Exam   Constitutional: He is oriented to person, place, and time. He appears well-developed and well-nourished. No distress.   HENT:   Head: Normocephalic and atraumatic.   Right Ear: Ear canal normal. Tympanic membrane is not erythematous. A middle ear effusion ( small) is present.   Left Ear: Tympanic membrane and ear canal normal.   Nose: Nose normal. Right sinus exhibits no maxillary sinus tenderness and no frontal sinus tenderness. Left sinus exhibits no maxillary sinus tenderness and no frontal sinus tenderness.   Mouth/Throat: Uvula is midline, oropharynx is clear and moist and mucous membranes are normal.   Eyes: Conjunctivae are normal. Right eye exhibits no discharge. Left eye exhibits no discharge.   Cardiovascular: Normal rate and regular rhythm.    Pulmonary/Chest: Effort normal. He has no wheezes. He has no rales.   Tight, barky cough   Lymphadenopathy:     He has no cervical adenopathy.   Neurological: He is alert and oriented to person, place, and time.   Nursing note and vitals reviewed.      Assessment/Plan   Adolfo was seen today for cough and fever.    Diagnoses and all orders for this visit:    Acute bronchitis, unspecified organism  -     MethylPREDNISolone (MEDROL, DEANNE,) 4 MG tablet; Take as directed on package instructions.  -     promethazine-dextromethorphan (PROMETHAZINE-DM) 6.25-15 MG/5ML syrup; Take 5 mL by mouth 4 (Four) Times a Day As Needed for Cough.    Push fluids  Rest  Tylenol as needed    Rx for Medrol, Promethazine   Declines inhaler    Call if no improvement with above or if you develop fever, purulent sputum, will add Zpak    RTW: 12-17-17           "

## 2017-12-06 NOTE — PATIENT INSTRUCTIONS
Acute Bronchitis  Bronchitis is inflammation of the airways that extend from the windpipe into the lungs (bronchi). The inflammation often causes mucus to develop. This leads to a cough, which is the most common symptom of bronchitis.   In acute bronchitis, the condition usually develops suddenly and goes away over time, usually in a couple weeks. Smoking, allergies, and asthma can make bronchitis worse. Repeated episodes of bronchitis may cause further lung problems.   CAUSES  Acute bronchitis is most often caused by the same virus that causes a cold. The virus can spread from person to person (contagious) through coughing, sneezing, and touching contaminated objects.  SIGNS AND SYMPTOMS   · Cough.    · Fever.    · Coughing up mucus.    · Body aches.    · Chest congestion.    · Chills.    · Shortness of breath.    · Sore throat.    DIAGNOSIS   Acute bronchitis is usually diagnosed through a physical exam. Your health care provider will also ask you questions about your medical history. Tests, such as chest X-rays, are sometimes done to rule out other conditions.   TREATMENT   Acute bronchitis usually goes away in a couple weeks. Oftentimes, no medical treatment is necessary. Medicines are sometimes given for relief of fever or cough. Antibiotic medicines are usually not needed but may be prescribed in certain situations. In some cases, an inhaler may be recommended to help reduce shortness of breath and control the cough. A cool mist vaporizer may also be used to help thin bronchial secretions and make it easier to clear the chest.   HOME CARE INSTRUCTIONS  · Get plenty of rest.    · Drink enough fluids to keep your urine clear or pale yellow (unless you have a medical condition that requires fluid restriction). Increasing fluids may help thin your respiratory secretions (sputum) and reduce chest congestion, and it will prevent dehydration.    · Take medicines only as directed by your health care provider.  · If  you were prescribed an antibiotic medicine, finish it all even if you start to feel better.  · Avoid smoking and secondhand smoke. Exposure to cigarette smoke or irritating chemicals will make bronchitis worse. If you are a smoker, consider using nicotine gum or skin patches to help control withdrawal symptoms. Quitting smoking will help your lungs heal faster.    · Reduce the chances of another bout of acute bronchitis by washing your hands frequently, avoiding people with cold symptoms, and trying not to touch your hands to your mouth, nose, or eyes.    · Keep all follow-up visits as directed by your health care provider.    SEEK MEDICAL CARE IF:  Your symptoms do not improve after 1 week of treatment.   SEEK IMMEDIATE MEDICAL CARE IF:  · You develop an increased fever or chills.    · You have chest pain.    · You have severe shortness of breath.  · You have bloody sputum.    · You develop dehydration.  · You faint or repeatedly feel like you are going to pass out.  · You develop repeated vomiting.  · You develop a severe headache.  MAKE SURE YOU:   · Understand these instructions.  · Will watch your condition.  · Will get help right away if you are not doing well or get worse.     This information is not intended to replace advice given to you by your health care provider. Make sure you discuss any questions you have with your health care provider.     Document Released: 01/25/2006 Document Revised: 01/08/2016 Document Reviewed: 06/10/2014  [employer] Interactive Patient Education ©2017 [employer]

## 2017-12-11 RX ORDER — ALBUTEROL SULFATE 90 UG/1
2 AEROSOL, METERED RESPIRATORY (INHALATION) EVERY 4 HOURS PRN
Qty: 18 G | Refills: 0 | Status: SHIPPED | OUTPATIENT
Start: 2017-12-11 | End: 2018-04-20

## 2017-12-11 RX ORDER — AZITHROMYCIN 250 MG/1
TABLET, FILM COATED ORAL
Qty: 6 TABLET | Refills: 0 | Status: SHIPPED | OUTPATIENT
Start: 2017-12-11 | End: 2018-04-20

## 2017-12-11 NOTE — PROGRESS NOTES
12-11-17:  Patient calls stating that he is still wheezing/congested.  Denies high fevers.  Will add Zithromax, Albuterol.  RTC or see PCP if symptoms persist, may need CXR.

## 2018-04-20 ENCOUNTER — OFFICE VISIT (OUTPATIENT)
Dept: FAMILY MEDICINE CLINIC | Facility: CLINIC | Age: 48
End: 2018-04-20

## 2018-04-20 VITALS
TEMPERATURE: 97.7 F | BODY MASS INDEX: 32.45 KG/M2 | HEIGHT: 70 IN | DIASTOLIC BLOOD PRESSURE: 92 MMHG | OXYGEN SATURATION: 99 % | SYSTOLIC BLOOD PRESSURE: 136 MMHG | HEART RATE: 66 BPM | WEIGHT: 226.7 LBS

## 2018-04-20 DIAGNOSIS — Z00.00 PREVENTATIVE HEALTH CARE: Primary | ICD-10-CM

## 2018-04-20 DIAGNOSIS — S46.212A BICEPS STRAIN, LEFT, INITIAL ENCOUNTER: ICD-10-CM

## 2018-04-20 PROCEDURE — 99214 OFFICE O/P EST MOD 30 MIN: CPT | Performed by: FAMILY MEDICINE

## 2018-04-20 RX ORDER — TIZANIDINE 2 MG/1
2 TABLET ORAL EVERY 6 HOURS PRN
Qty: 120 TABLET | Refills: 1 | Status: SHIPPED | OUTPATIENT
Start: 2018-04-20 | End: 2019-08-19

## 2018-04-20 NOTE — PATIENT INSTRUCTIONS
Preventive Care 40-64 Years, Male  Preventive care refers to lifestyle choices and visits with your health care provider that can promote health and wellness.  What does preventive care include?  · A yearly physical exam. This is also called an annual well check.  · Dental exams once or twice a year.  · Routine eye exams. Ask your health care provider how often you should have your eyes checked.  · Personal lifestyle choices, including:  ¨ Daily care of your teeth and gums.  ¨ Regular physical activity.  ¨ Eating a healthy diet.  ¨ Avoiding tobacco and drug use.  ¨ Limiting alcohol use.  ¨ Practicing safe sex.  ¨ Taking low-dose aspirin every day starting at age 50.  What happens during an annual well check?  The services and screenings done by your health care provider during your annual well check will depend on your age, overall health, lifestyle risk factors, and family history of disease.  Counseling   Your health care provider may ask you questions about your:  · Alcohol use.  · Tobacco use.  · Drug use.  · Emotional well-being.  · Home and relationship well-being.  · Sexual activity.  · Eating habits.  · Work and work environment.  Screening   You may have the following tests or measurements:  · Height, weight, and BMI.  · Blood pressure.  · Lipid and cholesterol levels. These may be checked every 5 years, or more frequently if you are over 50 years old.  · Skin check.  · Lung cancer screening. You may have this screening every year starting at age 55 if you have a 30-pack-year history of smoking and currently smoke or have quit within the past 15 years.  · Fecal occult blood test (FOBT) of the stool. You may have this test every year starting at age 50.  · Flexible sigmoidoscopy or colonoscopy. You may have a sigmoidoscopy every 5 years or a colonoscopy every 10 years starting at age 50.  · Prostate cancer screening. Recommendations will vary depending on your family history and other risks.  · Hepatitis C  blood test.  · Hepatitis B blood test.  · Sexually transmitted disease (STD) testing.  · Diabetes screening. This is done by checking your blood sugar (glucose) after you have not eaten for a while (fasting). You may have this done every 1-3 years.  Discuss your test results, treatment options, and if necessary, the need for more tests with your health care provider.  Vaccines   Your health care provider may recommend certain vaccines, such as:  · Influenza vaccine. This is recommended every year.  · Tetanus, diphtheria, and acellular pertussis (Tdap, Td) vaccine. You may need a Td booster every 10 years.  · Varicella vaccine. You may need this if you have not been vaccinated.  · Zoster vaccine. You may need this after age 60.  · Measles, mumps, and rubella (MMR) vaccine. You may need at least one dose of MMR if you were born in 1957 or later. You may also need a second dose.  · Pneumococcal 13-valent conjugate (PCV13) vaccine. You may need this if you have certain conditions and have not been vaccinated.  · Pneumococcal polysaccharide (PPSV23) vaccine. You may need one or two doses if you smoke cigarettes or if you have certain conditions.  · Meningococcal vaccine. You may need this if you have certain conditions.  · Hepatitis A vaccine. You may need this if you have certain conditions or if you travel or work in places where you may be exposed to hepatitis A.  · Hepatitis B vaccine. You may need this if you have certain conditions or if you travel or work in places where you may be exposed to hepatitis B.  · Haemophilus influenzae type b (Hib) vaccine. You may need this if you have certain risk factors.  Talk to your health care provider about which screenings and vaccines you need and how often you need them.  This information is not intended to replace advice given to you by your health care provider. Make sure you discuss any questions you have with your health care provider.  Document Released: 01/13/2017  Document Revised: 09/06/2017 Document Reviewed: 10/18/2016  [employer] Interactive Patient Education © 2017 [employer]    Have labs drawn after 6-. Fasting.

## 2018-04-20 NOTE — PROGRESS NOTES
Subjective   Adolfo Barron is a 47 y.o. muscular AA male non-smoker with seasonal Allergies, H/O R sciatica, see PMH/PSH. Pt presents for exam, to discuss Tx and F/u plan.    ' Began weight training back in Jan. About 3 weeks ago, started having pain in muscle L upper arm, not aware of any injury, did tear brachial muscle in R arm in past'.       Upper Extremity Issue   This is a new problem. The current episode started 1 to 4 weeks ago. The problem occurs intermittently. The problem has been unchanged. Associated symptoms include myalgias. Pertinent negatives include no congestion, coughing, diaphoresis, neck pain, numbness, vomiting or weakness. Associated symptoms comments: Soreness. Exacerbated by: Use. He has tried rest, position changes and NSAIDs for the symptoms. The treatment provided mild relief.        The following portions of the patient's history were reviewed and updated as appropriate: allergies, current medications, past family history, past medical history, past social history, past surgical history and problem list.    Review of Systems   Constitutional: Negative for activity change, appetite change, diaphoresis and unexpected weight change.   HENT: Negative for congestion, dental problem, drooling, ear discharge, ear pain, facial swelling, hearing loss, mouth sores, nosebleeds, postnasal drip, rhinorrhea, sinus pressure, sneezing, tinnitus, trouble swallowing and voice change.    Eyes: Negative for photophobia, pain, discharge, redness, itching and visual disturbance.        Eye exam Vision works   Respiratory: Negative for apnea, cough, choking, chest tightness, shortness of breath, wheezing and stridor.    Cardiovascular: Negative for palpitations and leg swelling.   Gastrointestinal: Negative for abdominal distention, anal bleeding, blood in stool, constipation, diarrhea, rectal pain and vomiting.   Endocrine: Negative for cold intolerance, heat intolerance, polydipsia, polyphagia and  polyuria.   Genitourinary: Negative for decreased urine volume, difficulty urinating, discharge, dysuria, enuresis, flank pain, frequency, genital sores, hematuria, penile pain, penile swelling, scrotal swelling, testicular pain and urgency.        Pain from back shoots into R testicle has resolved   Musculoskeletal: Positive for myalgias. Negative for back pain, gait problem, neck pain and neck stiffness.        No back pain today    Pain L Bicep   Skin: Negative for color change, pallor and wound.   Allergic/Immunologic: Negative for environmental allergies, food allergies and immunocompromised state.   Neurological: Negative for dizziness, tremors, seizures, syncope, facial asymmetry, speech difficulty, weakness, light-headedness and numbness.   Hematological: Negative for adenopathy. Does not bruise/bleed easily.   Psychiatric/Behavioral: Negative for agitation, behavioral problems, confusion, decreased concentration, dysphoric mood, hallucinations, self-injury, sleep disturbance and suicidal ideas. The patient is not nervous/anxious and is not hyperactive.        Objective   Physical Exam   Constitutional: He is oriented to person, place, and time. He appears well-developed and well-nourished.   HENT:   Head: Normocephalic.   Right Ear: External ear normal.   Left Ear: External ear normal.   Nose: Nose normal.   Mouth/Throat: Oropharynx is clear and moist.   Eyes: Conjunctivae and EOM are normal. Pupils are equal, round, and reactive to light. Right eye exhibits no discharge. Left eye exhibits no discharge. No scleral icterus.   Neck: Normal range of motion. Neck supple. No JVD present. No tracheal deviation present. No thyromegaly present.   Cardiovascular: Normal rate, regular rhythm, normal heart sounds and intact distal pulses.  Exam reveals no gallop and no friction rub.    No murmur heard.  Pulmonary/Chest: Effort normal and breath sounds normal. No respiratory distress. He has no wheezes. He has no  rales. He exhibits no tenderness.   Abdominal: Soft. Bowel sounds are normal. He exhibits no distension and no mass. There is no tenderness. No hernia.   Musculoskeletal: Normal range of motion. He exhibits tenderness. He exhibits no edema or deformity.   Tenderness L Biceps, No Hematoma, No Joaquín Muscle. WB2   Lymphadenopathy:     He has no cervical adenopathy.   Neurological: He is alert and oriented to person, place, and time. He has normal reflexes. He displays normal reflexes. No cranial nerve deficit. He exhibits normal muscle tone. Coordination normal.   Skin: Skin is warm and dry.   Psychiatric: He has a normal mood and affect. His behavior is normal. Judgment and thought content normal.   Nursing note and vitals reviewed.      Assessment/Plan   Adolfo was seen today for arm pain.    Diagnoses and all orders for this visit:    Preventative health care  -     CBC & Differential; Future  -     Comprehensive metabolic panel; Future  -     Lipid Panel; Future  -     TSH; Future  -     Urinalysis With / Culture If Indicated - Urine, Clean Catch; Future    BMI 32.0-32.9,adult  -     CBC & Differential; Future  -     Comprehensive metabolic panel; Future  -     Lipid Panel; Future  -     TSH; Future  -     Urinalysis With / Culture If Indicated - Urine, Clean Catch; Future    Biceps strain, left, initial encounter    Other orders  -     tiZANidine (ZANAFLEX) 2 MG tablet; Take 1 tablet by mouth Every 6 (Six) Hours As Needed for Muscle Spasms.      Discussed current health problems, meds, indications, tx plan, discussed F/U plan.          This document has been electronically signed by Gilles Canales MD

## 2018-04-22 PROBLEM — S46.212A BICEPS STRAIN, LEFT, INITIAL ENCOUNTER: Status: ACTIVE | Noted: 2018-04-22

## 2018-05-30 ENCOUNTER — TELEPHONE (OUTPATIENT)
Dept: FAMILY MEDICINE CLINIC | Facility: CLINIC | Age: 48
End: 2018-05-30

## 2018-05-30 DIAGNOSIS — M79.602 LEFT ARM PAIN: Primary | ICD-10-CM

## 2018-05-30 NOTE — TELEPHONE ENCOUNTER
Pt was seen in April for arm pain, was given a muscle relaxer.  States his arm still hurts, medication is not helping.  Please advise.

## 2018-07-18 ENCOUNTER — OFFICE VISIT (OUTPATIENT)
Dept: FAMILY MEDICINE CLINIC | Facility: CLINIC | Age: 48
End: 2018-07-18

## 2018-07-18 VITALS
HEART RATE: 74 BPM | SYSTOLIC BLOOD PRESSURE: 116 MMHG | BODY MASS INDEX: 33.01 KG/M2 | WEIGHT: 230.6 LBS | DIASTOLIC BLOOD PRESSURE: 82 MMHG | TEMPERATURE: 98.2 F | HEIGHT: 70 IN | OXYGEN SATURATION: 99 %

## 2018-07-18 DIAGNOSIS — M76.899 BICEPS FEMORIS TENDINITIS: ICD-10-CM

## 2018-07-18 DIAGNOSIS — Z00.00 PREVENTATIVE HEALTH CARE: ICD-10-CM

## 2018-07-18 DIAGNOSIS — M54.31 SCIATICA OF RIGHT SIDE: Primary | ICD-10-CM

## 2018-07-18 PROCEDURE — 99214 OFFICE O/P EST MOD 30 MIN: CPT | Performed by: FAMILY MEDICINE

## 2018-07-18 PROCEDURE — 85025 COMPLETE CBC W/AUTO DIFF WBC: CPT | Performed by: FAMILY MEDICINE

## 2018-07-18 PROCEDURE — 80053 COMPREHEN METABOLIC PANEL: CPT | Performed by: FAMILY MEDICINE

## 2018-07-18 PROCEDURE — 81003 URINALYSIS AUTO W/O SCOPE: CPT | Performed by: FAMILY MEDICINE

## 2018-07-18 PROCEDURE — 80061 LIPID PANEL: CPT | Performed by: FAMILY MEDICINE

## 2018-07-18 PROCEDURE — 84443 ASSAY THYROID STIM HORMONE: CPT | Performed by: FAMILY MEDICINE

## 2018-07-18 NOTE — PROGRESS NOTES
Subjective   Adolfo Barron is a 47 y.o. muscular AA male non-smoker with seasonal Allergies, H/O R sciatica, see PMH/PSH. Pt presents for exam, to discuss Tx and F/u plan.    ' Doing better overall, R sciatica has resolved. Saw specialist said L biceps muscle had some inflammation. Have not had yearly labs done yet'.    Back Pain   This is a chronic problem. The current episode started more than 1 month ago. The problem occurs intermittently. The problem has been gradually improving since onset. The pain is present in the lumbar spine and gluteal. The quality of the pain is described as aching, shooting and stabbing. The pain radiates to the right thigh. The pain is at a severity of 0/10. The patient is experiencing no pain. The symptoms are aggravated by standing. Pertinent negatives include no bladder incontinence, bowel incontinence, leg pain, numbness, paresis, paresthesias, perianal numbness or weakness. (R testicle pain) Risk factors include obesity (New work environment). He has tried analgesics and NSAIDs for the symptoms. The treatment provided mild relief.   Obesity   This is a chronic problem. The current episode started more than 1 year ago. The problem occurs daily. Associated symptoms include myalgias. Pertinent negatives include no congestion, coughing, diaphoresis, neck pain, numbness, vomiting or weakness. The symptoms are aggravated by bending, exertion, twisting and standing (For back pain). He has tried nothing for the symptoms. The treatment provided no relief.   Upper Extremity Issue   This is a new problem. The current episode started more than 1 month ago. The problem occurs intermittently. The problem has been gradually improving. Associated symptoms include myalgias. Pertinent negatives include no congestion, coughing, diaphoresis, neck pain, numbness, vomiting or weakness. Associated symptoms comments: Soreness. Exacerbated by: Use. He has tried rest, position changes, NSAIDs and  relaxation for the symptoms. The treatment provided mild relief.        The following portions of the patient's history were reviewed and updated as appropriate: allergies, current medications, past family history, past medical history, past social history, past surgical history and problem list.    Review of Systems   Constitutional: Negative for activity change, appetite change, diaphoresis and unexpected weight change.   HENT: Negative for congestion, dental problem, drooling, ear discharge, ear pain, facial swelling, hearing loss, mouth sores, nosebleeds, postnasal drip, rhinorrhea, sinus pressure, sneezing, tinnitus, trouble swallowing and voice change.    Eyes: Negative for pain and visual disturbance.        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    Cardiovascular: Negative for palpitations and leg swelling.   Gastrointestinal: Negative for abdominal distention, anal bleeding, blood in stool, bowel incontinence, rectal pain and vomiting.   Endocrine: Negative for cold intolerance, heat intolerance, polydipsia, polyphagia and polyuria.   Genitourinary: Negative for bladder incontinence, decreased urine volume, difficulty urinating, enuresis, genital sores, hematuria and penile swelling.        Pain from back shoots into R testicle has resolved   Musculoskeletal: Positive for myalgias. Negative for back pain, gait problem, neck pain and neck stiffness.        No back pain today    Pain L Bicep   Skin: Negative for color change, pallor and wound.   Allergic/Immunologic: Negative for environmental allergies, food allergies and immunocompromised state.   Neurological: Negative for dizziness, tremors, seizures, syncope, facial asymmetry, speech difficulty, weakness, light-headedness, numbness and paresthesias.   Hematological: Negative for adenopathy. Does not bruise/bleed easily.   Psychiatric/Behavioral: Negative for agitation, behavioral  problems, confusion, decreased concentration, dysphoric mood, hallucinations, self-injury, sleep disturbance and suicidal ideas. The patient is not nervous/anxious and is not hyperactive.        Objective   Physical Exam   Constitutional: He is oriented to person, place, and time. He appears well-developed and well-nourished.   HENT:   Head: Normocephalic.   Right Ear: External ear normal.   Left Ear: External ear normal.   Nose: Nose normal.   Mouth/Throat: Oropharynx is clear and moist.   Eyes: Pupils are equal, round, and reactive to light. Conjunctivae and EOM are normal. Right eye exhibits no discharge. Left eye exhibits no discharge. No scleral icterus.   Neck: Normal range of motion. Neck supple. No JVD present. No tracheal deviation present. No thyromegaly present.   Cardiovascular: Normal rate, regular rhythm, normal heart sounds and intact distal pulses.  Exam reveals no gallop and no friction rub.    No murmur heard.  Pulmonary/Chest: Effort normal and breath sounds normal. No respiratory distress. He has no wheezes. He has no rales. He exhibits no tenderness.   Abdominal: Soft. Bowel sounds are normal. He exhibits no distension and no mass. There is no tenderness. No hernia.   Musculoskeletal: Normal range of motion. He exhibits tenderness. He exhibits no edema or deformity.   Tenderness L Biceps resolved.   Lymphadenopathy:     He has no cervical adenopathy.   Neurological: He is alert and oriented to person, place, and time. He has normal reflexes. He displays normal reflexes. No cranial nerve deficit. He exhibits normal muscle tone. Coordination normal.   Skin: Skin is warm and dry.   Psychiatric: He has a normal mood and affect. His behavior is normal. Judgment and thought content normal.   Nursing note and vitals reviewed.      Assessment/Plan   Adolfo was seen today for back pain and allergies.    Diagnoses and all orders for this visit:    Sciatica of right side    Preventative health care    BMI  32.0-32.9,adult    Biceps femoris tendinitis      Discussed health problems, BMI, BEE, diet exercise, Discussed checking routine labs. Discussed USPSTF recommendations. Discussed F/U plan          This document has been electronically signed by Gilles Canales MD

## 2018-07-19 ENCOUNTER — TELEPHONE (OUTPATIENT)
Dept: FAMILY MEDICINE CLINIC | Facility: CLINIC | Age: 48
End: 2018-07-19

## 2018-07-19 NOTE — TELEPHONE ENCOUNTER
----- Message from Gilles Canales MD sent at 7/18/2018  8:15 PM CDT -----  Labs are OK will go over at next visit.

## 2018-07-22 PROBLEM — M76.899 BICEPS FEMORIS TENDINITIS: Status: ACTIVE | Noted: 2018-07-22

## 2019-01-24 ENCOUNTER — OFFICE VISIT (OUTPATIENT)
Dept: FAMILY MEDICINE CLINIC | Facility: CLINIC | Age: 49
End: 2019-01-24

## 2019-01-24 VITALS
TEMPERATURE: 98.1 F | HEART RATE: 70 BPM | OXYGEN SATURATION: 98 % | HEIGHT: 70 IN | BODY MASS INDEX: 35.07 KG/M2 | DIASTOLIC BLOOD PRESSURE: 88 MMHG | SYSTOLIC BLOOD PRESSURE: 142 MMHG | WEIGHT: 245 LBS

## 2019-01-24 DIAGNOSIS — M76.899 BICEPS FEMORIS TENDINITIS: ICD-10-CM

## 2019-01-24 DIAGNOSIS — M54.31 SCIATICA OF RIGHT SIDE: ICD-10-CM

## 2019-01-24 DIAGNOSIS — Z00.00 PREVENTATIVE HEALTH CARE: ICD-10-CM

## 2019-01-24 DIAGNOSIS — R00.2 PALPITATIONS: Primary | ICD-10-CM

## 2019-01-24 DIAGNOSIS — R07.89 RIGHT-SIDED CHEST WALL PAIN: ICD-10-CM

## 2019-01-24 PROCEDURE — 99214 OFFICE O/P EST MOD 30 MIN: CPT | Performed by: FAMILY MEDICINE

## 2019-01-24 PROCEDURE — 93010 ELECTROCARDIOGRAM REPORT: CPT | Performed by: INTERNAL MEDICINE

## 2019-01-24 PROCEDURE — 93005 ELECTROCARDIOGRAM TRACING: CPT | Performed by: FAMILY MEDICINE

## 2019-01-24 NOTE — PROGRESS NOTES
Subjective   Mata Nicolás Barron is a 48 y.o. muscular AA male non-smoker with seasonal Allergies, H/O R sciatica, see PMH/PSH. Pt presents for exam, to discuss Tx and F/u plan.     ' Doing OK overall. RUQ hurts with sitting, turning to R, hurts with cough,  Pain  Catching 5 /10 at times. Might have pulled something. Took Ibuprofen seemed to have helped. R sciatica has not flared back up. Feels heart race at times, not associated with any activity, no Chest pain, sweating or nausea '.     Back Pain   This is a chronic problem. The current episode started more than 1 month ago. The problem occurs intermittently. The problem has been gradually improving since onset. The pain is present in the lumbar spine and gluteal. The quality of the pain is described as aching, shooting and stabbing. The pain radiates to the right thigh. The pain is at a severity of 0/10. The patient is experiencing no pain. The symptoms are aggravated by standing. Associated symptoms include chest pain. Pertinent negatives include no bladder incontinence, bowel incontinence, leg pain, numbness, paresis, paresthesias, perianal numbness or weakness. (R testicle pain) Risk factors include obesity (New work environment). He has tried analgesics and NSAIDs for the symptoms. The treatment provided mild relief.   Obesity   This is a chronic problem. The current episode started more than 1 year ago. The problem occurs daily. Associated symptoms include chest pain and myalgias. Pertinent negatives include no congestion, coughing, diaphoresis, neck pain, numbness, vomiting or weakness. The symptoms are aggravated by bending, exertion, twisting and standing (For back pain). He has tried nothing for the symptoms. The treatment provided no relief.   Upper Extremity Issue   This is a new problem. The current episode started more than 1 month ago. The problem occurs intermittently. The problem has been gradually improving. Associated symptoms include chest pain  and myalgias. Pertinent negatives include no congestion, coughing, diaphoresis, neck pain, numbness, vomiting or weakness. Associated symptoms comments: Soreness. Exacerbated by: Use. He has tried rest, position changes, NSAIDs and relaxation for the symptoms. The treatment provided mild relief.   Chest Pain    This is a recurrent problem. The current episode started more than 1 month ago. The onset quality is gradual. The problem occurs intermittently. The problem has been unchanged. The pain is at a severity of 5/10. The pain is moderate. Quality: Catching. Radiates to: RUQ and ribcage. Associated symptoms include palpitations. Pertinent negatives include no back pain, cough, diaphoresis, dizziness, leg pain, numbness, vomiting or weakness. The pain is aggravated by exertion and movement. He has tried acetaminophen and NSAIDs for the symptoms. The treatment provided moderate relief.   Pertinent negatives for past medical history include no seizures.        The following portions of the patient's history were reviewed and updated as appropriate: allergies, current medications, past family history, past medical history, past social history, past surgical history and problem list.    Review of Systems   Constitutional: Negative for activity change, appetite change, diaphoresis and unexpected weight change.   HENT: Negative for congestion, dental problem, drooling, ear discharge, ear pain, facial swelling, hearing loss, mouth sores, nosebleeds, postnasal drip, rhinorrhea, sinus pressure, sneezing, tinnitus, trouble swallowing and voice change.    Eyes: Negative for pain and visual disturbance.        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         R lower ribcage pain   Cardiovascular: Positive for chest pain and palpitations. Negative for leg swelling.   Gastrointestinal: Negative for abdominal distention, anal bleeding, blood in stool, bowel  incontinence, rectal pain and vomiting.        RUQ pain   Endocrine: Negative for cold intolerance, heat intolerance, polydipsia, polyphagia and polyuria.   Genitourinary: Negative for bladder incontinence, decreased urine volume, difficulty urinating, enuresis, genital sores, hematuria and penile swelling.        Pain from back shoots into R testicle has resolved   Musculoskeletal: Positive for myalgias. Negative for back pain, gait problem, neck pain and neck stiffness.        No back pain today    Pain L Bicep   Skin: Negative for color change, pallor and wound.   Allergic/Immunologic: Negative for environmental allergies, food allergies and immunocompromised state.   Neurological: Negative for dizziness, tremors, seizures, syncope, facial asymmetry, speech difficulty, weakness, light-headedness, numbness and paresthesias.   Hematological: Negative for adenopathy. Does not bruise/bleed easily.   Psychiatric/Behavioral: Negative for agitation, behavioral problems, confusion, decreased concentration, dysphoric mood, hallucinations, self-injury, sleep disturbance and suicidal ideas. The patient is not nervous/anxious and is not hyperactive.        Objective   Physical Exam   Constitutional: He is oriented to person, place, and time. He appears well-developed and well-nourished.   HENT:   Head: Normocephalic.   Right Ear: External ear normal.   Left Ear: External ear normal.   Nose: Nose normal.   Mouth/Throat: Oropharynx is clear and moist.   Eyes: Conjunctivae and EOM are normal. Pupils are equal, round, and reactive to light. Right eye exhibits no discharge. Left eye exhibits no discharge. No scleral icterus.   Neck: Normal range of motion. Neck supple. No JVD present. No tracheal deviation present. No thyromegaly present.   Cardiovascular: Normal rate, regular rhythm, normal heart sounds and intact distal pulses. Exam reveals no gallop and no friction rub.   No murmur heard.  Pulmonary/Chest: Effort normal and  breath sounds normal. No respiratory distress. He has no wheezes. He has no rales. He exhibits no tenderness.   Abdominal: Soft. Bowel sounds are normal. He exhibits no distension and no mass. There is tenderness. There is no rebound and no guarding. No hernia.   Has mild tenderness R lower lateral ribcage   Musculoskeletal: Normal range of motion. He exhibits tenderness. He exhibits no edema or deformity.   Lymphadenopathy:     He has no cervical adenopathy.   Neurological: He is alert and oriented to person, place, and time. He has normal reflexes. He displays normal reflexes. No cranial nerve deficit. He exhibits normal muscle tone. Coordination normal.   Skin: Skin is warm and dry.   Psychiatric: He has a normal mood and affect. His behavior is normal. Judgment and thought content normal.   Nursing note and vitals reviewed.      Assessment/Plan   Adolfo was seen today for pain.    Diagnoses and all orders for this visit:    Palpitations  -     ECG 12 Lead    Sciatica of right side    Biceps femoris tendinitis    Preventative health care    Right-sided chest wall pain  Comments:  RUQ      Discussed exam, health problems, EKG, discussed referral to Cardiology , Pt would like to wait, will F/U if symptoms return, or seek eval. Discussed F/U here.          This document has been electronically signed by Gilles Canales MD

## 2019-01-24 NOTE — PATIENT INSTRUCTIONS
Calorie Counting for Weight Loss  Calories are units of energy. Your body needs a certain amount of calories from food to keep you going throughout the day. When you eat more calories than your body needs, your body stores the extra calories as fat. When you eat fewer calories than your body needs, your body burns fat to get the energy it needs.  Calorie counting means keeping track of how many calories you eat and drink each day. Calorie counting can be helpful if you need to lose weight. If you make sure to eat fewer calories than your body needs, you should lose weight. Ask your health care provider what a healthy weight is for you.  For calorie counting to work, you will need to eat the right number of calories in a day in order to lose a healthy amount of weight per week. A dietitian can help you determine how many calories you need in a day and will give you suggestions on how to reach your calorie goal.  · A healthy amount of weight to lose per week is usually 1-2 lb (0.5-0.9 kg). This usually means that your daily calorie intake should be reduced by 500-750 calories.  · Eating 1,200 - 1,500 calories per day can help most women lose weight.  · Eating 1,500 - 1,800 calories per day can help most men lose weight.    What do I need to know about calorie counting?  In order to meet your daily calorie goal, you will need to:  · Find out how many calories are in each food you would like to eat. Try to do this before you eat.  · Decide how much of the food you plan to eat.  · Write down what you ate and how many calories it had. Doing this is called keeping a food log.    To successfully lose weight, it is important to balance calorie counting with a healthy lifestyle that includes regular activity. Aim for 150 minutes of moderate exercise (such as walking) or 75 minutes of vigorous exercise (such as running) each week.  Where do I find calorie information?    The number of calories in a food can be found on a  Nutrition Facts label. If a food does not have a Nutrition Facts label, try to look up the calories online or ask your dietitian for help.  Remember that calories are listed per serving. If you choose to have more than one serving of a food, you will have to multiply the calories per serving by the amount of servings you plan to eat. For example, the label on a package of bread might say that a serving size is 1 slice and that there are 90 calories in a serving. If you eat 1 slice, you will have eaten 90 calories. If you eat 2 slices, you will have eaten 180 calories.  How do I keep a food log?  Immediately after each meal, record the following information in your food log:  · What you ate. Don't forget to include toppings, sauces, and other extras on the food.  · How much you ate. This can be measured in cups, ounces, or number of items.  · How many calories each food and drink had.  · The total number of calories in the meal.    Keep your food log near you, such as in a small notebook in your pocket, or use a mobile naseem or website. Some programs will calculate calories for you and show you how many calories you have left for the day to meet your goal.  What are some calorie counting tips?  · Use your calories on foods and drinks that will fill you up and not leave you hungry:  ? Some examples of foods that fill you up are nuts and nut butters, vegetables, lean proteins, and high-fiber foods like whole grains. High-fiber foods are foods with more than 5 g fiber per serving.  ? Drinks such as sodas, specialty coffee drinks, alcohol, and juices have a lot of calories, yet do not fill you up.  · Eat nutritious foods and avoid empty calories. Empty calories are calories you get from foods or beverages that do not have many vitamins or protein, such as candy, sweets, and soda. It is better to have a nutritious high-calorie food (such as an avocado) than a food with few nutrients (such as a bag of chips).  · Know how  "many calories are in the foods you eat most often. This will help you calculate calorie counts faster.  · Pay attention to calories in drinks. Low-calorie drinks include water and unsweetened drinks.  · Pay attention to nutrition labels for \"low fat\" or \"fat free\" foods. These foods sometimes have the same amount of calories or more calories than the full fat versions. They also often have added sugar, starch, or salt, to make up for flavor that was removed with the fat.  · Find a way of tracking calories that works for you. Get creative. Try different apps or programs if writing down calories does not work for you.  What are some portion control tips?  · Know how many calories are in a serving. This will help you know how many servings of a certain food you can have.  · Use a measuring cup to measure serving sizes. You could also try weighing out portions on a kitchen scale. With time, you will be able to estimate serving sizes for some foods.  · Take some time to put servings of different foods on your favorite plates, bowls, and cups so you know what a serving looks like.  · Try not to eat straight from a bag or box. Doing this can lead to overeating. Put the amount you would like to eat in a cup or on a plate to make sure you are eating the right portion.  · Use smaller plates, glasses, and bowls to prevent overeating.  · Try not to multitask (for example, watch TV or use your computer) while eating. If it is time to eat, sit down at a table and enjoy your food. This will help you to know when you are full. It will also help you to be aware of what you are eating and how much you are eating.  What are tips for following this plan?  Reading food labels  · Check the calorie count compared to the serving size. The serving size may be smaller than what you are used to eating.  · Check the source of the calories. Make sure the food you are eating is high in vitamins and protein and low in saturated and trans " fats.  Shopping  · Read nutrition labels while you shop. This will help you make healthy decisions before you decide to purchase your food.  · Make a grocery list and stick to it.  Cooking  · Try to cook your favorite foods in a healthier way. For example, try baking instead of frying.  · Use low-fat dairy products.  Meal planning  · Use more fruits and vegetables. Half of your plate should be fruits and vegetables.  · Include lean proteins like poultry and fish.  How do I count calories when eating out?  · Ask for smaller portion sizes.  · Consider sharing an entree and sides instead of getting your own entree.  · If you get your own entree, eat only half. Ask for a box at the beginning of your meal and put the rest of your entree in it so you are not tempted to eat it.  · If calories are listed on the menu, choose the lower calorie options.  · Choose dishes that include vegetables, fruits, whole grains, low-fat dairy products, and lean protein.  · Choose items that are boiled, broiled, grilled, or steamed. Stay away from items that are buttered, battered, fried, or served with cream sauce. Items labeled “crispy” are usually fried, unless stated otherwise.  · Choose water, low-fat milk, unsweetened iced tea, or other drinks without added sugar. If you want an alcoholic beverage, choose a lower calorie option such as a glass of wine or light beer.  · Ask for dressings, sauces, and syrups on the side. These are usually high in calories, so you should limit the amount you eat.  · If you want a salad, choose a garden salad and ask for grilled meats. Avoid extra toppings like lau, cheese, or fried items. Ask for the dressing on the side, or ask for olive oil and vinegar or lemon to use as dressing.  · Estimate how many servings of a food you are given. For example, a serving of cooked rice is ½ cup or about the size of half a baseball. Knowing serving sizes will help you be aware of how much food you are eating at  "restaurants. The list below tells you how big or small some common portion sizes are based on everyday objects:  ? 1 oz--4 stacked dice.  ? 3 oz--1 deck of cards.  ? 1 tsp--1 die.  ? 1 Tbsp--½ a ping-pong ball.  ? 2 Tbsp--1 ping-pong ball.  ? ½ cup--½ baseball.  ? 1 cup--1 baseball.  Summary  · Calorie counting means keeping track of how many calories you eat and drink each day. If you eat fewer calories than your body needs, you should lose weight.  · A healthy amount of weight to lose per week is usually 1-2 lb (0.5-0.9 kg). This usually means reducing your daily calorie intake by 500-750 calories.  · The number of calories in a food can be found on a Nutrition Facts label. If a food does not have a Nutrition Facts label, try to look up the calories online or ask your dietitian for help.  · Use your calories on foods and drinks that will fill you up, and not on foods and drinks that will leave you hungry.  · Use smaller plates, glasses, and bowls to prevent overeating.  This information is not intended to replace advice given to you by your health care provider. Make sure you discuss any questions you have with your health care provider.  Document Released: 12/18/2006 Document Revised: 11/17/2017 Document Reviewed: 11/17/2017  [employer] Interactive Patient Education © 2018 [employer]  DASH Eating Plan  DASH stands for \"Dietary Approaches to Stop Hypertension.\" The DASH eating plan is a healthy eating plan that has been shown to reduce high blood pressure (hypertension). It may also reduce your risk for type 2 diabetes, heart disease, and stroke. The DASH eating plan may also help with weight loss.  What are tips for following this plan?  General guidelines  · Avoid eating more than 2,300 mg (milligrams) of salt (sodium) a day. If you have hypertension, you may need to reduce your sodium intake to 1,500 mg a day.  · Limit alcohol intake to no more than 1 drink a day for nonpregnant women and 2 drinks a day for " "men. One drink equals 12 oz of beer, 5 oz of wine, or 1½ oz of hard liquor.  · Work with your health care provider to maintain a healthy body weight or to lose weight. Ask what an ideal weight is for you.  · Get at least 30 minutes of exercise that causes your heart to beat faster (aerobic exercise) most days of the week. Activities may include walking, swimming, or biking.  · Work with your health care provider or diet and nutrition specialist (dietitian) to adjust your eating plan to your individual calorie needs.  Reading food labels  · Check food labels for the amount of sodium per serving. Choose foods with less than 5 percent of the Daily Value of sodium. Generally, foods with less than 300 mg of sodium per serving fit into this eating plan.  · To find whole grains, look for the word \"whole\" as the first word in the ingredient list.  Shopping  · Buy products labeled as \"low-sodium\" or \"no salt added.\"  · Buy fresh foods. Avoid canned foods and premade or frozen meals.  Cooking  · Avoid adding salt when cooking. Use salt-free seasonings or herbs instead of table salt or sea salt. Check with your health care provider or pharmacist before using salt substitutes.  · Do not woods foods. Cook foods using healthy methods such as baking, boiling, grilling, and broiling instead.  · Cook with heart-healthy oils, such as olive, canola, soybean, or sunflower oil.  Meal planning    · Eat a balanced diet that includes:  ? 5 or more servings of fruits and vegetables each day. At each meal, try to fill half of your plate with fruits and vegetables.  ? Up to 6-8 servings of whole grains each day.  ? Less than 6 oz of lean meat, poultry, or fish each day. A 3-oz serving of meat is about the same size as a deck of cards. One egg equals 1 oz.  ? 2 servings of low-fat dairy each day.  ? A serving of nuts, seeds, or beans 5 times each week.  ? Heart-healthy fats. Healthy fats called Omega-3 fatty acids are found in foods such as " flaxseeds and coldwater fish, like sardines, salmon, and mackerel.  · Limit how much you eat of the following:  ? Canned or prepackaged foods.  ? Food that is high in trans fat, such as fried foods.  ? Food that is high in saturated fat, such as fatty meat.  ? Sweets, desserts, sugary drinks, and other foods with added sugar.  ? Full-fat dairy products.  · Do not salt foods before eating.  · Try to eat at least 2 vegetarian meals each week.  · Eat more home-cooked food and less restaurant, buffet, and fast food.  · When eating at a restaurant, ask that your food be prepared with less salt or no salt, if possible.  What foods are recommended?  The items listed may not be a complete list. Talk with your dietitian about what dietary choices are best for you.  Grains  Whole-grain or whole-wheat bread. Whole-grain or whole-wheat pasta. Brown rice. Oatmeal. Quinoa. Bulgur. Whole-grain and low-sodium cereals. Marisa bread. Low-fat, low-sodium crackers. Whole-wheat flour tortillas.  Vegetables  Fresh or frozen vegetables (raw, steamed, roasted, or grilled). Low-sodium or reduced-sodium tomato and vegetable juice. Low-sodium or reduced-sodium tomato sauce and tomato paste. Low-sodium or reduced-sodium canned vegetables.  Fruits  All fresh, dried, or frozen fruit. Canned fruit in natural juice (without added sugar).  Meat and other protein foods  Skinless chicken or turkey. Ground chicken or turkey. Pork with fat trimmed off. Fish and seafood. Egg whites. Dried beans, peas, or lentils. Unsalted nuts, nut butters, and seeds. Unsalted canned beans. Lean cuts of beef with fat trimmed off. Low-sodium, lean deli meat.  Dairy  Low-fat (1%) or fat-free (skim) milk. Fat-free, low-fat, or reduced-fat cheeses. Nonfat, low-sodium ricotta or cottage cheese. Low-fat or nonfat yogurt. Low-fat, low-sodium cheese.  Fats and oils  Soft margarine without trans fats. Vegetable oil. Low-fat, reduced-fat, or light mayonnaise and salad dressings  (reduced-sodium). Canola, safflower, olive, soybean, and sunflower oils. Avocado.  Seasoning and other foods  Herbs. Spices. Seasoning mixes without salt. Unsalted popcorn and pretzels. Fat-free sweets.  What foods are not recommended?  The items listed may not be a complete list. Talk with your dietitian about what dietary choices are best for you.  Grains  Baked goods made with fat, such as croissants, muffins, or some breads. Dry pasta or rice meal packs.  Vegetables  Creamed or fried vegetables. Vegetables in a cheese sauce. Regular canned vegetables (not low-sodium or reduced-sodium). Regular canned tomato sauce and paste (not low-sodium or reduced-sodium). Regular tomato and vegetable juice (not low-sodium or reduced-sodium). Pickles. Olives.  Fruits  Canned fruit in a light or heavy syrup. Fried fruit. Fruit in cream or butter sauce.  Meat and other protein foods  Fatty cuts of meat. Ribs. Fried meat. Brush. Sausage. Bologna and other processed lunch meats. Salami. Fatback. Hotdogs. Bratwurst. Salted nuts and seeds. Canned beans with added salt. Canned or smoked fish. Whole eggs or egg yolks. Chicken or turkey with skin.  Dairy  Whole or 2% milk, cream, and half-and-half. Whole or full-fat cream cheese. Whole-fat or sweetened yogurt. Full-fat cheese. Nondairy creamers. Whipped toppings. Processed cheese and cheese spreads.  Fats and oils  Butter. Stick margarine. Lard. Shortening. Ghee. Brush fat. Tropical oils, such as coconut, palm kernel, or palm oil.  Seasoning and other foods  Salted popcorn and pretzels. Onion salt, garlic salt, seasoned salt, table salt, and sea salt. Worcestershire sauce. Tartar sauce. Barbecue sauce. Teriyaki sauce. Soy sauce, including reduced-sodium. Steak sauce. Canned and packaged gravies. Fish sauce. Oyster sauce. Cocktail sauce. Horseradish that you find on the shelf. Ketchup. Mustard. Meat flavorings and tenderizers. Bouillon cubes. Hot sauce and Tabasco sauce. Premade or  packaged marinades. Premade or packaged taco seasonings. Relishes. Regular salad dressings.  Where to find more information:  · National Heart, Lung, and Blood [place]: www.nhlbi.nih.gov  · American Heart Association: www.heart.org  Summary  · The DASH eating plan is a healthy eating plan that has been shown to reduce high blood pressure (hypertension). It may also reduce your risk for type 2 diabetes, heart disease, and stroke.  · With the DASH eating plan, you should limit salt (sodium) intake to 2,300 mg a day. If you have hypertension, you may need to reduce your sodium intake to 1,500 mg a day.  · When on the DASH eating plan, aim to eat more fresh fruits and vegetables, whole grains, lean proteins, low-fat dairy, and heart-healthy fats.  · Work with your health care provider or diet and nutrition specialist (dietitian) to adjust your eating plan to your individual calorie needs.  This information is not intended to replace advice given to you by your health care provider. Make sure you discuss any questions you have with your health care provider.  Document Released: 12/06/2012 Document Revised: 12/11/2017 Document Reviewed: 12/11/2017  [employer] Interactive Patient Education © 2018 [employer]    Preventive Care 40-64 Years, Male  Preventive care refers to lifestyle choices and visits with your health care provider that can promote health and wellness.  What does preventive care include?  A yearly physical exam. This is also called an annual well check.  Dental exams once or twice a year.  Routine eye exams. Ask your health care provider how often you should have your eyes checked.  Personal lifestyle choices, including:  Daily care of your teeth and gums.  Regular physical activity.  Eating a healthy diet.  Avoiding tobacco and drug use.  Limiting alcohol use.  Practicing safe sex.  Taking low-dose aspirin every day starting at age 50.  What happens during an annual well check?  The services and  screenings done by your health care provider during your annual well check will depend on your age, overall health, lifestyle risk factors, and family history of disease.  Counseling  Your health care provider may ask you questions about your:  Alcohol use.  Tobacco use.  Drug use.  Emotional well-being.  Home and relationship well-being.  Sexual activity.  Eating habits.  Work and work environment.    Screening  You may have the following tests or measurements:  Height, weight, and BMI.  Blood pressure.  Lipid and cholesterol levels. These may be checked every 5 years, or more frequently if you are over 50 years old.  Skin check.  Lung cancer screening. You may have this screening every year starting at age 55 if you have a 30-pack-year history of smoking and currently smoke or have quit within the past 15 years.  Fecal occult blood test (FOBT) of the stool. You may have this test every year starting at age 50.  Flexible sigmoidoscopy or colonoscopy. You may have a sigmoidoscopy every 5 years or a colonoscopy every 10 years starting at age 50.  Prostate cancer screening. Recommendations will vary depending on your family history and other risks.  Hepatitis C blood test.  Hepatitis B blood test.  Sexually transmitted disease (STD) testing.  Diabetes screening. This is done by checking your blood sugar (glucose) after you have not eaten for a while (fasting). You may have this done every 1-3 years.    Discuss your test results, treatment options, and if necessary, the need for more tests with your health care provider.  Vaccines  Your health care provider may recommend certain vaccines, such as:  Influenza vaccine. This is recommended every year.  Tetanus, diphtheria, and acellular pertussis (Tdap, Td) vaccine. You may need a Td booster every 10 years.  Varicella vaccine. You may need this if you have not been vaccinated.  Zoster vaccine. You may need this after age 60.  Measles, mumps, and rubella (MMR) vaccine. You  may need at least one dose of MMR if you were born in 1957 or later. You may also need a second dose.  Pneumococcal 13-valent conjugate (PCV13) vaccine. You may need this if you have certain conditions and have not been vaccinated.  Pneumococcal polysaccharide (PPSV23) vaccine. You may need one or two doses if you smoke cigarettes or if you have certain conditions.  Meningococcal vaccine. You may need this if you have certain conditions.  Hepatitis A vaccine. You may need this if you have certain conditions or if you travel or work in places where you may be exposed to hepatitis A.  Hepatitis B vaccine. You may need this if you have certain conditions or if you travel or work in places where you may be exposed to hepatitis B.  Haemophilus influenzae type b (Hib) vaccine. You may need this if you have certain risk factors.    Talk to your health care provider about which screenings and vaccines you need and how often you need them.  This information is not intended to replace advice given to you by your health care provider. Make sure you discuss any questions you have with your health care provider.  Document Released: 01/13/2017 Document Revised: 09/06/2017 Document Reviewed: 10/18/2016  [employer] Interactive Patient Education © 2018 [employer]

## 2019-01-28 PROBLEM — R00.2 PALPITATIONS: Status: ACTIVE | Noted: 2019-01-28

## 2019-08-19 ENCOUNTER — OFFICE VISIT (OUTPATIENT)
Dept: FAMILY MEDICINE CLINIC | Facility: CLINIC | Age: 49
End: 2019-08-19

## 2019-08-19 VITALS
OXYGEN SATURATION: 99 % | DIASTOLIC BLOOD PRESSURE: 78 MMHG | BODY MASS INDEX: 34.27 KG/M2 | SYSTOLIC BLOOD PRESSURE: 126 MMHG | TEMPERATURE: 98.6 F | HEART RATE: 75 BPM | HEIGHT: 70 IN | WEIGHT: 239.4 LBS

## 2019-08-19 DIAGNOSIS — Z00.00 ROUTINE MEDICAL EXAM: ICD-10-CM

## 2019-08-19 DIAGNOSIS — Z80.42 FAMILY HX OF PROSTATE CANCER: ICD-10-CM

## 2019-08-19 PROCEDURE — 99396 PREV VISIT EST AGE 40-64: CPT | Performed by: FAMILY MEDICINE

## 2019-08-19 RX ORDER — KETOCONAZOLE 20 MG/ML
SHAMPOO TOPICAL 2 TIMES WEEKLY
Qty: 1 EACH | Refills: 3 | Status: SHIPPED | OUTPATIENT
Start: 2019-08-19 | End: 2020-06-03

## 2019-08-19 NOTE — PROGRESS NOTES
Subjective   Adolfo Barron is a 48 y.o. muscular AA male non-smoker with seasonal Allergies, H/O R sciatica, see PMH/PSH. Pt presents for exam, to discuss Tx and F/u plan   ' Back pain has not flared up. Recently had ring worm on skin, thinks caught it from Gym. Allergies have been OK. Need routine check-up, yearly labs'    Back Pain   This is a chronic problem. The current episode started more than 1 year ago. The problem occurs intermittently. The problem has been resolved since onset. The pain is present in the lumbar spine and gluteal. The quality of the pain is described as aching, shooting and stabbing. The pain radiates to the right thigh. The pain is at a severity of 0/10. The patient is experiencing no pain. The symptoms are aggravated by standing. Pertinent negatives include no bladder incontinence, bowel incontinence, chest pain, numbness, paresis, paresthesias, perianal numbness or weakness. (R testicle pain) Risk factors include obesity (New work environment). He has tried analgesics and NSAIDs for the symptoms. The treatment provided mild relief.   Obesity   This is a chronic problem. The current episode started more than 1 year ago. The problem occurs daily. The problem has been unchanged. Associated symptoms include myalgias. Pertinent negatives include no chest pain, congestion, coughing, diaphoresis, neck pain, numbness, vomiting or weakness. The symptoms are aggravated by eating. Treatments tried: Exercise. The treatment provided significant relief.        The following portions of the patient's history were reviewed and updated as appropriate: allergies, current medications, past family history, past medical history, past social history, past surgical history and problem list.    Review of Systems   Constitutional: Negative for activity change, appetite change, diaphoresis and unexpected weight change.   HENT: Negative for congestion, dental problem, drooling, ear discharge, ear pain, facial  swelling, hearing loss, mouth sores, nosebleeds, postnasal drip, rhinorrhea, sinus pressure, sneezing, tinnitus, trouble swallowing and voice change.    Eyes: Negative for pain and visual disturbance.        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    Cardiovascular: Negative for chest pain, palpitations and leg swelling.   Gastrointestinal: Negative for abdominal distention, anal bleeding, blood in stool, bowel incontinence, rectal pain and vomiting.   Endocrine: Negative for cold intolerance, heat intolerance, polydipsia, polyphagia and polyuria.   Genitourinary: Negative for bladder incontinence, decreased urine volume, difficulty urinating, enuresis, genital sores, hematuria and penile swelling.        Pain from back shoots into R testicle has resolved   Musculoskeletal: Positive for back pain and myalgias. Negative for gait problem, neck pain and neck stiffness.        No back pain today     Skin: Negative for color change, pallor and wound.   Allergic/Immunologic: Negative for environmental allergies, food allergies and immunocompromised state.   Neurological: Negative for dizziness, tremors, seizures, syncope, facial asymmetry, speech difficulty, weakness, light-headedness, numbness and paresthesias.   Hematological: Negative for adenopathy. Does not bruise/bleed easily.   Psychiatric/Behavioral: Negative for agitation, behavioral problems, confusion, decreased concentration, dysphoric mood, hallucinations, self-injury, sleep disturbance and suicidal ideas. The patient is not nervous/anxious and is not hyperactive.        Objective   Physical Exam   Constitutional: He is oriented to person, place, and time. He appears well-developed and well-nourished.   HENT:   Head: Normocephalic.   Right Ear: External ear normal.   Left Ear: External ear normal.   Nose: Nose normal.   Mouth/Throat: Oropharynx is clear and moist.   Eyes: Conjunctivae and  EOM are normal. Pupils are equal, round, and reactive to light. Right eye exhibits no discharge. Left eye exhibits no discharge. No scleral icterus.   Neck: Normal range of motion. Neck supple. No JVD present. No tracheal deviation present. No thyromegaly present.   Cardiovascular: Normal rate, regular rhythm, normal heart sounds and intact distal pulses. Exam reveals no gallop and no friction rub.   No murmur heard.  Pulmonary/Chest: Effort normal and breath sounds normal. No respiratory distress. He has no wheezes. He has no rales. He exhibits no tenderness.   Abdominal: Soft. Bowel sounds are normal. He exhibits no distension and no mass. There is no tenderness. There is no rebound and no guarding. No hernia.   Musculoskeletal: Normal range of motion. He exhibits no edema, tenderness or deformity.   Lymphadenopathy:     He has no cervical adenopathy.   Neurological: He is alert and oriented to person, place, and time. He has normal reflexes. He displays normal reflexes. No cranial nerve deficit. He exhibits normal muscle tone. Coordination normal.   Skin: Skin is warm and dry.   Psychiatric: He has a normal mood and affect. His behavior is normal. Judgment and thought content normal.   Nursing note and vitals reviewed.      Assessment/Plan   Adolfo was seen today for annual exam.    Diagnoses and all orders for this visit:    Family hx of prostate cancer  -     PSA SCREENING; Future    Routine medical exam  -     ketoconazole (NIZORAL) 2 % shampoo; Apply  topically to the appropriate area as directed 2 (Two) Times a Week.  -     CBC & Differential; Future  -     Comprehensive metabolic panel; Future  -     TSH; Future  -     Urinalysis With Culture If Indicated -; Future  -     PSA SCREENING; Future  -     Lipid panel; Future    BMI 32.0-32.9,adult    Discussed exam, health problems, Tinea, Tx. Discussed USPSTF recommendations, Checking routine labs. Discussed BMI, BEE, diet, exercise. Discussed F/U  plan.          This document has been electronically signed by Gilles Canales MD

## 2019-08-19 NOTE — PATIENT INSTRUCTIONS
Preventive Care 40-64 Years, Male  Preventive care refers to lifestyle choices and visits with your health care provider that can promote health and wellness.  What does preventive care include?    · A yearly physical exam. This is also called an annual well check.  · Dental exams once or twice a year.  · Routine eye exams. Ask your health care provider how often you should have your eyes checked.  · Personal lifestyle choices, including:  ? Daily care of your teeth and gums.  ? Regular physical activity.  ? Eating a healthy diet.  ? Avoiding tobacco and drug use.  ? Limiting alcohol use.  ? Practicing safe sex.  ? Taking low-dose aspirin every day starting at age 50.  What happens during an annual well check?  The services and screenings done by your health care provider during your annual well check will depend on your age, overall health, lifestyle risk factors, and family history of disease.  Counseling  Your health care provider may ask you questions about your:  · Alcohol use.  · Tobacco use.  · Drug use.  · Emotional well-being.  · Home and relationship well-being.  · Sexual activity.  · Eating habits.  · Work and work environment.  Screening  You may have the following tests or measurements:  · Height, weight, and BMI.  · Blood pressure.  · Lipid and cholesterol levels. These may be checked every 5 years, or more frequently if you are over 50 years old.  · Skin check.  · Lung cancer screening. You may have this screening every year starting at age 55 if you have a 30-pack-year history of smoking and currently smoke or have quit within the past 15 years.  · Colorectal cancer screening. All adults should have this screening starting at age 50 and continuing until age 75. Your health care provider may recommend screening at age 45. You will have tests every 1-10 years, depending on your results and the type of screening test. People at increased risk should start screening at an earlier age. Screening tests may  include:  ? Guaiac-based fecal occult blood testing.  ? Fecal immunochemical test (FIT).  ? Stool DNA test.  ? Virtual colonoscopy.  ? Sigmoidoscopy. During this test, a flexible tube with a tiny camera (sigmoidoscope) is used to examine your rectum and lower colon. The sigmoidoscope is inserted through your anus into your rectum and lower colon.  ? Colonoscopy. During this test, a long, thin, flexible tube with a tiny camera (colonoscope) is used to examine your entire colon and rectum.  · Prostate cancer screening. Recommendations will vary depending on your family history and other risks.  · Hepatitis C blood test.  · Hepatitis B blood test.  · Sexually transmitted disease (STD) testing.  · Diabetes screening. This is done by checking your blood sugar (glucose) after you have not eaten for a while (fasting). You may have this done every 1-3 years.  Discuss your test results, treatment options, and if necessary, the need for more tests with your health care provider.  Vaccines  Your health care provider may recommend certain vaccines, such as:  · Influenza vaccine. This is recommended every year.  · Tetanus, diphtheria, and acellular pertussis (Tdap, Td) vaccine. You may need a Td booster every 10 years.  · Varicella vaccine. You may need this if you have not been vaccinated.  · Zoster vaccine. You may need this after age 60.  · Measles, mumps, and rubella (MMR) vaccine. You may need at least one dose of MMR if you were born in 1957 or later. You may also need a second dose.  · Pneumococcal 13-valent conjugate (PCV13) vaccine. You may need this if you have certain conditions and have not been vaccinated.  · Pneumococcal polysaccharide (PPSV23) vaccine. You may need one or two doses if you smoke cigarettes or if you have certain conditions.  · Meningococcal vaccine. You may need this if you have certain conditions.  · Hepatitis A vaccine. You may need this if you have certain conditions or if you travel or work in  places where you may be exposed to hepatitis A.  · Hepatitis B vaccine. You may need this if you have certain conditions or if you travel or work in places where you may be exposed to hepatitis B.  · Haemophilus influenzae type b (Hib) vaccine. You may need this if you have certain risk factors.  Talk to your health care provider about which screenings and vaccines you need and how often you need them.  This information is not intended to replace advice given to you by your health care provider. Make sure you discuss any questions you have with your health care provider.  Document Released: 01/13/2017 Document Revised: 02/07/2019 Document Reviewed: 10/18/2016  Elsevier Interactive Patient Education © 2019 [employer]

## 2019-09-09 ENCOUNTER — LAB (OUTPATIENT)
Dept: LAB | Facility: HOSPITAL | Age: 49
End: 2019-09-09

## 2019-09-09 DIAGNOSIS — Z80.42 FAMILY HX OF PROSTATE CANCER: ICD-10-CM

## 2019-09-09 DIAGNOSIS — Z00.00 ROUTINE MEDICAL EXAM: ICD-10-CM

## 2019-09-09 LAB
ALBUMIN SERPL-MCNC: 4.3 G/DL (ref 3.5–5.2)
ALBUMIN/GLOB SERPL: 1.5 G/DL
ALP SERPL-CCNC: 68 U/L (ref 39–117)
ALT SERPL W P-5'-P-CCNC: 48 U/L (ref 1–41)
ANION GAP SERPL CALCULATED.3IONS-SCNC: 10.9 MMOL/L (ref 5–15)
AST SERPL-CCNC: 62 U/L (ref 1–40)
BASOPHILS # BLD AUTO: 0.02 10*3/MM3 (ref 0–0.2)
BASOPHILS NFR BLD AUTO: 0.6 % (ref 0–1.5)
BILIRUB SERPL-MCNC: 0.4 MG/DL (ref 0.2–1.2)
BUN BLD-MCNC: 15 MG/DL (ref 6–20)
BUN/CREAT SERPL: 13.3 (ref 7–25)
CALCIUM SPEC-SCNC: 9.5 MG/DL (ref 8.6–10.5)
CHLORIDE SERPL-SCNC: 102 MMOL/L (ref 98–107)
CHOLEST SERPL-MCNC: 174 MG/DL (ref 0–200)
CO2 SERPL-SCNC: 24.1 MMOL/L (ref 22–29)
CREAT BLD-MCNC: 1.13 MG/DL (ref 0.76–1.27)
DEPRECATED RDW RBC AUTO: 43.8 FL (ref 37–54)
EOSINOPHIL # BLD AUTO: 0.14 10*3/MM3 (ref 0–0.4)
EOSINOPHIL NFR BLD AUTO: 4.1 % (ref 0.3–6.2)
ERYTHROCYTE [DISTWIDTH] IN BLOOD BY AUTOMATED COUNT: 13.5 % (ref 12.3–15.4)
GFR SERPL CREATININE-BSD FRML MDRD: 84 ML/MIN/1.73
GLOBULIN UR ELPH-MCNC: 2.8 GM/DL
GLUCOSE BLD-MCNC: 89 MG/DL (ref 65–99)
HCT VFR BLD AUTO: 49.8 % (ref 37.5–51)
HDLC SERPL-MCNC: 46 MG/DL (ref 40–60)
HGB BLD-MCNC: 16.5 G/DL (ref 13–17.7)
IMM GRANULOCYTES # BLD AUTO: 0.02 10*3/MM3 (ref 0–0.05)
IMM GRANULOCYTES NFR BLD AUTO: 0.6 % (ref 0–0.5)
LDLC SERPL CALC-MCNC: 119 MG/DL (ref 0–100)
LDLC/HDLC SERPL: 2.58 {RATIO}
LYMPHOCYTES # BLD AUTO: 1.2 10*3/MM3 (ref 0.7–3.1)
LYMPHOCYTES NFR BLD AUTO: 35.1 % (ref 19.6–45.3)
MCH RBC QN AUTO: 29.4 PG (ref 26.6–33)
MCHC RBC AUTO-ENTMCNC: 33.1 G/DL (ref 31.5–35.7)
MCV RBC AUTO: 88.8 FL (ref 79–97)
MONOCYTES # BLD AUTO: 0.45 10*3/MM3 (ref 0.1–0.9)
MONOCYTES NFR BLD AUTO: 13.2 % (ref 5–12)
NEUTROPHILS # BLD AUTO: 1.59 10*3/MM3 (ref 1.7–7)
NEUTROPHILS NFR BLD AUTO: 46.4 % (ref 42.7–76)
NRBC BLD AUTO-RTO: 0 /100 WBC (ref 0–0.2)
PLATELET # BLD AUTO: 281 10*3/MM3 (ref 140–450)
PMV BLD AUTO: 10.9 FL (ref 6–12)
POTASSIUM BLD-SCNC: 4.4 MMOL/L (ref 3.5–5.2)
PROT SERPL-MCNC: 7.1 G/DL (ref 6–8.5)
PSA SERPL-MCNC: 0.57 NG/ML (ref 0–4)
RBC # BLD AUTO: 5.61 10*6/MM3 (ref 4.14–5.8)
SODIUM BLD-SCNC: 137 MMOL/L (ref 136–145)
TRIGL SERPL-MCNC: 46 MG/DL (ref 0–150)
TSH SERPL DL<=0.05 MIU/L-ACNC: 2.23 UIU/ML (ref 0.27–4.2)
VLDLC SERPL-MCNC: 9.2 MG/DL (ref 5–40)
WBC NRBC COR # BLD: 3.42 10*3/MM3 (ref 3.4–10.8)

## 2019-09-09 PROCEDURE — 81003 URINALYSIS AUTO W/O SCOPE: CPT

## 2019-09-09 PROCEDURE — 84443 ASSAY THYROID STIM HORMONE: CPT

## 2019-09-09 PROCEDURE — G0103 PSA SCREENING: HCPCS

## 2019-09-09 PROCEDURE — 85025 COMPLETE CBC W/AUTO DIFF WBC: CPT

## 2019-09-09 PROCEDURE — 80061 LIPID PANEL: CPT

## 2019-09-09 PROCEDURE — 80053 COMPREHEN METABOLIC PANEL: CPT

## 2019-09-10 LAB
BILIRUB UR QL STRIP: NEGATIVE
CLARITY UR: ABNORMAL
COLOR UR: YELLOW
GLUCOSE UR STRIP-MCNC: NEGATIVE MG/DL
HGB UR QL STRIP.AUTO: NEGATIVE
KETONES UR QL STRIP: NEGATIVE
LEUKOCYTE ESTERASE UR QL STRIP.AUTO: NEGATIVE
NITRITE UR QL STRIP: NEGATIVE
PH UR STRIP.AUTO: <=5 [PH] (ref 5–8)
PROT UR QL STRIP: NEGATIVE
SP GR UR STRIP: 1.02 (ref 1–1.03)
UROBILINOGEN UR QL STRIP: ABNORMAL

## 2019-09-13 ENCOUNTER — TELEPHONE (OUTPATIENT)
Dept: FAMILY MEDICINE CLINIC | Facility: CLINIC | Age: 49
End: 2019-09-13

## 2019-09-13 NOTE — TELEPHONE ENCOUNTER
----- Message from Gilles Canales MD sent at 9/12/2019  9:26 AM CDT -----  Labs are OK will go over all at next visit.

## 2019-11-05 ENCOUNTER — OFFICE VISIT (OUTPATIENT)
Dept: FAMILY MEDICINE CLINIC | Facility: CLINIC | Age: 49
End: 2019-11-05

## 2019-11-05 VITALS
BODY MASS INDEX: 34.29 KG/M2 | DIASTOLIC BLOOD PRESSURE: 88 MMHG | TEMPERATURE: 98.3 F | HEIGHT: 70 IN | SYSTOLIC BLOOD PRESSURE: 120 MMHG | OXYGEN SATURATION: 99 % | HEART RATE: 77 BPM | WEIGHT: 239.5 LBS

## 2019-11-05 DIAGNOSIS — M25.512 ACUTE PAIN OF LEFT SHOULDER: ICD-10-CM

## 2019-11-05 DIAGNOSIS — K64.8 OTHER HEMORRHOIDS: ICD-10-CM

## 2019-11-05 PROCEDURE — 96372 THER/PROPH/DIAG INJ SC/IM: CPT | Performed by: FAMILY MEDICINE

## 2019-11-05 PROCEDURE — 99213 OFFICE O/P EST LOW 20 MIN: CPT | Performed by: FAMILY MEDICINE

## 2019-11-05 RX ORDER — LIDOCAINE 40 MG/G
CREAM TOPICAL
COMMUNITY
Start: 2019-11-04 | End: 2020-06-03

## 2019-11-05 RX ORDER — HYDROCORTISONE ACETATE 25 MG/1
25 SUPPOSITORY RECTAL 2 TIMES DAILY PRN
Qty: 12 SUPPOSITORY | Refills: 3 | Status: SHIPPED | OUTPATIENT
Start: 2019-11-05 | End: 2019-11-14 | Stop reason: SDUPTHER

## 2019-11-05 RX ORDER — DOCUSATE SODIUM 100 MG/1
100 CAPSULE, LIQUID FILLED ORAL 2 TIMES DAILY
COMMUNITY
End: 2020-06-03

## 2019-11-05 RX ORDER — CEPHALEXIN 500 MG/1
500 CAPSULE ORAL 3 TIMES DAILY
Qty: 21 CAPSULE | Refills: 0 | Status: SHIPPED | OUTPATIENT
Start: 2019-11-05 | End: 2020-06-03

## 2019-11-05 RX ORDER — HYDROCORTISONE ACETATE 25 MG/1
25 SUPPOSITORY RECTAL 2 TIMES DAILY
COMMUNITY
End: 2019-11-05 | Stop reason: SDUPTHER

## 2019-11-05 RX ORDER — TRIAMCINOLONE ACETONIDE 40 MG/ML
40 INJECTION, SUSPENSION INTRA-ARTICULAR; INTRAMUSCULAR ONCE
Status: COMPLETED | OUTPATIENT
Start: 2019-11-05 | End: 2019-11-05

## 2019-11-05 RX ORDER — POLYETHYLENE GLYCOL 3350 17 G/17G
POWDER, FOR SOLUTION ORAL
Refills: 1 | COMMUNITY
Start: 2019-11-03 | End: 2020-06-03

## 2019-11-05 RX ADMIN — TRIAMCINOLONE ACETONIDE 40 MG: 40 INJECTION, SUSPENSION INTRA-ARTICULAR; INTRAMUSCULAR at 14:17

## 2019-11-05 NOTE — PROGRESS NOTES
Subjective   Adolfo Barron is a 48 y.o. muscular AA male non-smoker with seasonal Allergies, H/O R sciatica, see PMH/PSH. Pt presents for exam, to discuss acute health problem, Tx and F/U plan.     'Hemorrhodial  problems, painful with some bleeding. L shoulder and neck , arthritis flaring.'       Obesity   This is a chronic problem. The current episode started more than 1 year ago. The problem occurs daily. The problem has been unchanged. The symptoms are aggravated by eating. Treatments tried: Exercise. The treatment provided significant relief.   Hemorrhoids   This is a recurrent problem. The current episode started 1 to 4 weeks ago. The problem occurs intermittently. The problem has been gradually worsening. Associated symptoms comments: Rectal Pain. Exacerbated by: BM. The treatment provided no relief.   Upper Extremity Issue   This is a recurrent problem. The current episode started 1 to 4 weeks ago. The problem occurs daily. The problem has been gradually worsening. Associated symptoms comments: Shoulder and neck pain. Exacerbated by: Use. He has tried acetaminophen and NSAIDs for the symptoms. The treatment provided mild relief.        The following portions of the patient's history were reviewed and updated as appropriate: allergies, current medications, past family history, past medical history, past social history, past surgical history and problem list.    Review of Systems   Constitutional: Negative for activity change, appetite change and unexpected weight change.   HENT: Negative for dental problem, drooling, ear discharge, ear pain, facial swelling, hearing loss, mouth sores, nosebleeds, postnasal drip, rhinorrhea, sinus pressure, sneezing, tinnitus, trouble swallowing and voice change.    Eyes: Negative for pain and visual disturbance.        Eye exam [facility]   Respiratory: Negative for apnea, choking, chest tightness, wheezing and stridor.    Cardiovascular: Negative for palpitations and  leg swelling.   Gastrointestinal: Positive for hemorrhoids and rectal pain. Negative for abdominal distention, anal bleeding and blood in stool.        Hemorhoids       Endocrine: Negative for cold intolerance, heat intolerance, polydipsia, polyphagia and polyuria.   Genitourinary: Negative for decreased urine volume, difficulty urinating, enuresis, genital sores, hematuria and penile swelling.        Pain from back shoots into R testicle has resolved   Musculoskeletal: Negative for gait problem and neck stiffness.        No back pain today  L shoulder and neck pain   Skin: Negative for color change, pallor and wound.   Allergic/Immunologic: Negative for environmental allergies, food allergies and immunocompromised state.   Neurological: Negative for dizziness, tremors, seizures, syncope, facial asymmetry, speech difficulty and light-headedness.   Hematological: Negative for adenopathy. Does not bruise/bleed easily.   Psychiatric/Behavioral: Negative for agitation, behavioral problems, confusion, decreased concentration, dysphoric mood, hallucinations, self-injury, sleep disturbance and suicidal ideas. The patient is not nervous/anxious and is not hyperactive.        Objective   Physical Exam   Constitutional: He is oriented to person, place, and time. He appears well-developed and well-nourished.   HENT:   Head: Normocephalic.   Right Ear: External ear normal.   Left Ear: External ear normal.   Nose: Nose normal.   Mouth/Throat: Oropharynx is clear and moist.   Eyes: Conjunctivae and EOM are normal. Pupils are equal, round, and reactive to light. Right eye exhibits no discharge. Left eye exhibits no discharge. No scleral icterus.   Neck: Normal range of motion. Neck supple. No JVD present. No tracheal deviation present. No thyromegaly present.   Cardiovascular: Normal rate, regular rhythm, normal heart sounds and intact distal pulses. Exam reveals no gallop and no friction rub.   No murmur heard.  Pulmonary/Chest:  Effort normal and breath sounds normal. No respiratory distress. He has no wheezes. He has no rales. He exhibits no tenderness.   Abdominal: Soft. Bowel sounds are normal. He exhibits no distension and no mass. There is no tenderness. There is no rebound and no guarding. No hernia.   Genitourinary:   Genitourinary Comments: External Hemorrhoid at 9 O'clock position of anus.   Musculoskeletal: Normal range of motion. He exhibits no edema, tenderness or deformity.   Lymphadenopathy:     He has no cervical adenopathy.   Neurological: He is alert and oriented to person, place, and time. He has normal reflexes. He displays normal reflexes. No cranial nerve deficit. He exhibits normal muscle tone. Coordination normal.   Skin: Skin is warm and dry.   Psychiatric: He has a normal mood and affect. His behavior is normal. Judgment and thought content normal.   Nursing note and vitals reviewed.      Assessment/Plan   Adolfo was seen today for hemorrhoids, rectal bleeding and shoulder pain.    Diagnoses and all orders for this visit:    Acute pain of left shoulder  -     triamcinolone acetonide (KENALOG-40) injection 40 mg    Other hemorrhoids    Adult BMI 34.0-34.9 kg/sq m    Other orders  -     cephalexin (KEFLEX) 500 MG capsule; Take 1 capsule by mouth 3 (Three) Times a Day.  -     Discontinue: hydrocortisone (ANUSOL-HC) 25 MG suppository; Insert 1 suppository into the rectum 2 (Two) Times a Day As Needed for Hemorrhoids.    Discussed exam, L shoulder pain, Hemorrhoids, Tx, rationale. Discussed BMI, BEE, diet, exercise. Discussed F/U plan.  Patient's Body mass index is 34.36 kg/m². BMI is above normal parameters. Recommendations include: educational material, exercise counseling, nutrition counseling and referral to primary care.            This document has been electronically signed by Gilles Canales MD

## 2019-11-14 RX ORDER — HYDROCORTISONE ACETATE 25 MG/1
25 SUPPOSITORY RECTAL 2 TIMES DAILY PRN
Qty: 12 SUPPOSITORY | Refills: 3 | Status: SHIPPED | OUTPATIENT
Start: 2019-11-14 | End: 2020-07-13

## 2019-11-14 NOTE — TELEPHONE ENCOUNTER
Pt called and left  requesting a refill on a cream and states that he hasnt received the suppository's Pt phone [phone]

## 2019-11-14 NOTE — TELEPHONE ENCOUNTER
Spoke with pt, his scripts were sent to [facility] and wanted them sent to [facility]. Pt will call [facility] and have them transfer them.

## 2019-11-16 PROBLEM — M25.512 ACUTE PAIN OF LEFT SHOULDER: Status: ACTIVE | Noted: 2019-11-16

## 2019-11-16 PROBLEM — K64.8 OTHER HEMORRHOIDS: Status: ACTIVE | Noted: 2019-11-16

## 2019-11-16 NOTE — PATIENT INSTRUCTIONS
Exercising to Lose Weight  Exercise is structured, repetitive physical activity to improve fitness and health. Getting regular exercise is important for everyone. It is especially important if you are overweight. Being overweight increases your risk of heart disease, stroke, diabetes, high blood pressure, and several types of cancer. Reducing your calorie intake and exercising can help you lose weight.  Exercise is usually categorized as moderate or vigorous intensity. To lose weight, most people need to do a certain amount of moderate-intensity or vigorous-intensity exercise each week.  Moderate-intensity exercise    Moderate-intensity exercise is any activity that gets you moving enough to burn at least three times more energy (calories) than if you were sitting.  Examples of moderate exercise include:  · Walking a mile in 15 minutes.  · Doing light yard work.  · Biking at an easy pace.  Most people should get at least 150 minutes (2 hours and 30 minutes) a week of moderate-intensity exercise to maintain their body weight.  Vigorous-intensity exercise  Vigorous-intensity exercise is any activity that gets you moving enough to burn at least six times more calories than if you were sitting. When you exercise at this intensity, you should be working hard enough that you are not able to carry on a conversation.  Examples of vigorous exercise include:  · Running.  · Playing a team sport, such as football, basketball, and soccer.  · Jumping rope.  Most people should get at least 75 minutes (1 hour and 15 minutes) a week of vigorous-intensity exercise to maintain their body weight.  How can exercise affect me?  When you exercise enough to burn more calories than you eat, you lose weight. Exercise also reduces body fat and builds muscle. The more muscle you have, the more calories you burn. Exercise also:  · Improves mood.  · Reduces stress and tension.  · Improves your overall fitness, flexibility, and  endurance.  · Increases bone strength.  The amount of exercise you need to lose weight depends on:  · Your age.  · The type of exercise.  · Any health conditions you have.  · Your overall physical ability.  Talk to your health care provider about how much exercise you need and what types of activities are safe for you.  What actions can I take to lose weight?  Nutrition    · Make changes to your diet as told by your health care provider or diet and nutrition specialist (dietitian). This may include:  ? Eating fewer calories.  ? Eating more protein.  ? Eating less unhealthy fats.  ? Eating a diet that includes fresh fruits and vegetables, whole grains, low-fat dairy products, and lean protein.  ? Avoiding foods with added fat, salt, and sugar.  · Drink plenty of water while you exercise to prevent dehydration or heat stroke.  Activity  · Choose an activity that you enjoy and set realistic goals. Your health care provider can help you make an exercise plan that works for you.  · Exercise at a moderate or vigorous intensity most days of the week.  ? The intensity of exercise may vary from person to person. You can tell how intense a workout is for you by paying attention to your breathing and heartbeat. Most people will notice their breathing and heartbeat get faster with more intense exercise.  · Do resistance training twice each week, such as:  ? Push-ups.  ? Sit-ups.  ? Lifting weights.  ? Using resistance bands.  · Getting short amounts of exercise can be just as helpful as long structured periods of exercise. If you have trouble finding time to exercise, try to include exercise in your daily routine.  ? Get up, stretch, and walk around every 30 minutes throughout the day.  ? Go for a walk during your lunch break.  ? Park your car farther away from your destination.  ? If you take public transportation, get off one stop early and walk the rest of the way.  ? Make phone calls while standing up and walking  around.  ? Take the stairs instead of elevators or escalators.  · Wear comfortable clothes and shoes with good support.  · Do not exercise so much that you hurt yourself, feel dizzy, or get very short of breath.  Where to find more information  · [place] Department of Health and Human Services: www.hhs.gov  · Centers for Disease Control and Prevention (CDC): www.cdc.gov  Contact a health care provider:  · Before starting a new exercise program.  · If you have questions or concerns about your weight.  · If you have a medical problem that keeps you from exercising.  Get help right away if you have any of the following while exercising:  · Injury.  · Dizziness.  · Difficulty breathing or shortness of breath that does not go away when you stop exercising.  · Chest pain.  · Rapid heartbeat.  Summary  · Being overweight increases your risk of heart disease, stroke, diabetes, high blood pressure, and several types of cancer.  · Losing weight happens when you burn more calories than you eat.  · Reducing the amount of calories you eat in addition to getting regular moderate or vigorous exercise each week helps you lose weight.  This information is not intended to replace advice given to you by your health care provider. Make sure you discuss any questions you have with your health care provider.  Document Released: 01/20/2012 Document Revised: 12/31/2018 Document Reviewed: 12/31/2018  [employer] Interactive Patient Education © 2019 [employer]      Calorie Counting for Weight Loss  Calories are units of energy. Your body needs a certain amount of calories from food to keep you going throughout the day. When you eat more calories than your body needs, your body stores the extra calories as fat. When you eat fewer calories than your body needs, your body burns fat to get the energy it needs.  Calorie counting means keeping track of how many calories you eat and drink each day. Calorie counting can be helpful if you need to lose  weight. If you make sure to eat fewer calories than your body needs, you should lose weight. Ask your health care provider what a healthy weight is for you.  For calorie counting to work, you will need to eat the right number of calories in a day in order to lose a healthy amount of weight per week. A dietitian can help you determine how many calories you need in a day and will give you suggestions on how to reach your calorie goal.  · A healthy amount of weight to lose per week is usually 1-2 lb (0.5-0.9 kg). This usually means that your daily calorie intake should be reduced by 500-750 calories.  · Eating 1,200 - 1,500 calories per day can help most women lose weight.  · Eating 1,500 - 1,800 calories per day can help most men lose weight.  What is my plan?  My goal is to have __________ calories per day.  If I have this many calories per day, I should lose around __________ pounds per week.  What do I need to know about calorie counting?  In order to meet your daily calorie goal, you will need to:  · Find out how many calories are in each food you would like to eat. Try to do this before you eat.  · Decide how much of the food you plan to eat.  · Write down what you ate and how many calories it had. Doing this is called keeping a food log.  To successfully lose weight, it is important to balance calorie counting with a healthy lifestyle that includes regular activity. Aim for 150 minutes of moderate exercise (such as walking) or 75 minutes of vigorous exercise (such as running) each week.  Where do I find calorie information?    The number of calories in a food can be found on a Nutrition Facts label. If a food does not have a Nutrition Facts label, try to look up the calories online or ask your dietitian for help.  Remember that calories are listed per serving. If you choose to have more than one serving of a food, you will have to multiply the calories per serving by the amount of servings you plan to eat. For  "example, the label on a package of bread might say that a serving size is 1 slice and that there are 90 calories in a serving. If you eat 1 slice, you will have eaten 90 calories. If you eat 2 slices, you will have eaten 180 calories.  How do I keep a food log?  Immediately after each meal, record the following information in your food log:  · What you ate. Don't forget to include toppings, sauces, and other extras on the food.  · How much you ate. This can be measured in cups, ounces, or number of items.  · How many calories each food and drink had.  · The total number of calories in the meal.  Keep your food log near you, such as in a small notebook in your pocket, or use a mobile naseem or website. Some programs will calculate calories for you and show you how many calories you have left for the day to meet your goal.  What are some calorie counting tips?    · Use your calories on foods and drinks that will fill you up and not leave you hungry:  ? Some examples of foods that fill you up are nuts and nut butters, vegetables, lean proteins, and high-fiber foods like whole grains. High-fiber foods are foods with more than 5 g fiber per serving.  ? Drinks such as sodas, specialty coffee drinks, alcohol, and juices have a lot of calories, yet do not fill you up.  · Eat nutritious foods and avoid empty calories. Empty calories are calories you get from foods or beverages that do not have many vitamins or protein, such as candy, sweets, and soda. It is better to have a nutritious high-calorie food (such as an avocado) than a food with few nutrients (such as a bag of chips).  · Know how many calories are in the foods you eat most often. This will help you calculate calorie counts faster.  · Pay attention to calories in drinks. Low-calorie drinks include water and unsweetened drinks.  · Pay attention to nutrition labels for \"low fat\" or \"fat free\" foods. These foods sometimes have the same amount of calories or more calories " than the full fat versions. They also often have added sugar, starch, or salt, to make up for flavor that was removed with the fat.  · Find a way of tracking calories that works for you. Get creative. Try different apps or programs if writing down calories does not work for you.  What are some portion control tips?  · Know how many calories are in a serving. This will help you know how many servings of a certain food you can have.  · Use a measuring cup to measure serving sizes. You could also try weighing out portions on a kitchen scale. With time, you will be able to estimate serving sizes for some foods.  · Take some time to put servings of different foods on your favorite plates, bowls, and cups so you know what a serving looks like.  · Try not to eat straight from a bag or box. Doing this can lead to overeating. Put the amount you would like to eat in a cup or on a plate to make sure you are eating the right portion.  · Use smaller plates, glasses, and bowls to prevent overeating.  · Try not to multitask (for example, watch TV or use your computer) while eating. If it is time to eat, sit down at a table and enjoy your food. This will help you to know when you are full. It will also help you to be aware of what you are eating and how much you are eating.  What are tips for following this plan?  Reading food labels  · Check the calorie count compared to the serving size. The serving size may be smaller than what you are used to eating.  · Check the source of the calories. Make sure the food you are eating is high in vitamins and protein and low in saturated and trans fats.  Shopping  · Read nutrition labels while you shop. This will help you make healthy decisions before you decide to purchase your food.  · Make a grocery list and stick to it.  Cooking  · Try to cook your favorite foods in a healthier way. For example, try baking instead of frying.  · Use low-fat dairy products.  Meal planning  · Use more fruits  "and vegetables. Half of your plate should be fruits and vegetables.  · Include lean proteins like poultry and fish.  How do I count calories when eating out?  · Ask for smaller portion sizes.  · Consider sharing an entree and sides instead of getting your own entree.  · If you get your own entree, eat only half. Ask for a box at the beginning of your meal and put the rest of your entree in it so you are not tempted to eat it.  · If calories are listed on the menu, choose the lower calorie options.  · Choose dishes that include vegetables, fruits, whole grains, low-fat dairy products, and lean protein.  · Choose items that are boiled, broiled, grilled, or steamed. Stay away from items that are buttered, battered, fried, or served with cream sauce. Items labeled \"crispy\" are usually fried, unless stated otherwise.  · Choose water, low-fat milk, unsweetened iced tea, or other drinks without added sugar. If you want an alcoholic beverage, choose a lower calorie option such as a glass of wine or light beer.  · Ask for dressings, sauces, and syrups on the side. These are usually high in calories, so you should limit the amount you eat.  · If you want a salad, choose a garden salad and ask for grilled meats. Avoid extra toppings like lau, cheese, or fried items. Ask for the dressing on the side, or ask for olive oil and vinegar or lemon to use as dressing.  · Estimate how many servings of a food you are given. For example, a serving of cooked rice is ½ cup or about the size of half a baseball. Knowing serving sizes will help you be aware of how much food you are eating at restaurants. The list below tells you how big or small some common portion sizes are based on everyday objects:  ? 1 oz--4 stacked dice.  ? 3 oz--1 deck of cards.  ? 1 tsp--1 die.  ? 1 Tbsp--½ a ping-pong ball.  ? 2 Tbsp--1 ping-pong ball.  ? ½ cup--½ baseball.  ? 1 cup--1 baseball.  Summary  · Calorie counting means keeping track of how many calories " you eat and drink each day. If you eat fewer calories than your body needs, you should lose weight.  · A healthy amount of weight to lose per week is usually 1-2 lb (0.5-0.9 kg). This usually means reducing your daily calorie intake by 500-750 calories.  · The number of calories in a food can be found on a Nutrition Facts label. If a food does not have a Nutrition Facts label, try to look up the calories online or ask your dietitian for help.  · Use your calories on foods and drinks that will fill you up, and not on foods and drinks that will leave you hungry.  · Use smaller plates, glasses, and bowls to prevent overeating.  This information is not intended to replace advice given to you by your health care provider. Make sure you discuss any questions you have with your health care provider.  Document Released: 12/18/2006 Document Revised: 09/06/2019 Document Reviewed: 11/17/2017  [employer] Interactive Patient Education © 2019 [employer]      Preventive Care 40-64 Years, Female  Preventive care refers to lifestyle choices and visits with your health care provider that can promote health and wellness.  What does preventive care include?    · A yearly physical exam. This is also called an annual well check.  · Dental exams once or twice a year.  · Routine eye exams. Ask your health care provider how often you should have your eyes checked.  · Personal lifestyle choices, including:  ? Daily care of your teeth and gums.  ? Regular physical activity.  ? Eating a healthy diet.  ? Avoiding tobacco and drug use.  ? Limiting alcohol use.  ? Practicing safe sex.  ? Taking low-dose aspirin daily starting at age 50.  ? Taking vitamin and mineral supplements as recommended by your health care provider.  What happens during an annual well check?  The services and screenings done by your health care provider during your annual well check will depend on your age, overall health, lifestyle risk factors, and family history of  disease.  Counseling  Your health care provider may ask you questions about your:  · Alcohol use.  · Tobacco use.  · Drug use.  · Emotional well-being.  · Home and relationship well-being.  · Sexual activity.  · Eating habits.  · Work and work environment.  · Method of birth control.  · Menstrual cycle.  · Pregnancy history.  Screening  You may have the following tests or measurements:  · Height, weight, and BMI.  · Blood pressure.  · Lipid and cholesterol levels. These may be checked every 5 years, or more frequently if you are over 50 years old.  · Skin check.  · Lung cancer screening. You may have this screening every year starting at age 55 if you have a 30-pack-year history of smoking and currently smoke or have quit within the past 15 years.  · Colorectal cancer screening. All adults should have this screening starting at age 50 and continuing until age 75. Your health care provider may recommend screening at age 45. You will have tests every 1-10 years, depending on your results and the type of screening test. People at increased risk should start screening at an earlier age. Screening tests may include:  ? Guaiac-based fecal occult blood testing.  ? Fecal immunochemical test (FIT).  ? Stool DNA test.  ? Virtual colonoscopy.  ? Sigmoidoscopy. During this test, a flexible tube with a tiny camera (sigmoidoscope) is used to examine your rectum and lower colon. The sigmoidoscope is inserted through your anus into your rectum and lower colon.  ? Colonoscopy. During this test, a long, thin, flexible tube with a tiny camera (colonoscope) is used to examine your entire colon and rectum.  · Hepatitis C blood test.  · Hepatitis B blood test.  · Sexually transmitted disease (STD) testing.  · Diabetes screening. This is done by checking your blood sugar (glucose) after you have not eaten for a while (fasting). You may have this done every 1-3 years.  · Mammogram. This may be done every 1-2 years. Talk to your health care  provider about when you should start having regular mammograms. This may depend on whether you have a family history of breast cancer.  · BRCA-related cancer screening. This may be done if you have a family history of breast, ovarian, tubal, or peritoneal cancers.  · Pelvic exam and Pap test. This may be done every 3 years starting at age 21. Starting at age 30, this may be done every 5 years if you have a Pap test in combination with an HPV test.  · Bone density scan. This is done to screen for osteoporosis. You may have this scan if you are at high risk for osteoporosis.  Discuss your test results, treatment options, and if necessary, the need for more tests with your health care provider.  Vaccines  Your health care provider may recommend certain vaccines, such as:  · Influenza vaccine. This is recommended every year.  · Tetanus, diphtheria, and acellular pertussis (Tdap, Td) vaccine. You may need a Td booster every 10 years.  · Varicella vaccine. You may need this if you have not been vaccinated.  · Zoster vaccine. You may need this after age 60.  · Measles, mumps, and rubella (MMR) vaccine. You may need at least one dose of MMR if you were born in 1957 or later. You may also need a second dose.  · Pneumococcal 13-valent conjugate (PCV13) vaccine. You may need this if you have certain conditions and were not previously vaccinated.  · Pneumococcal polysaccharide (PPSV23) vaccine. You may need one or two doses if you smoke cigarettes or if you have certain conditions.  · Meningococcal vaccine. You may need this if you have certain conditions.  · Hepatitis A vaccine. You may need this if you have certain conditions or if you travel or work in places where you may be exposed to hepatitis A.  · Hepatitis B vaccine. You may need this if you have certain conditions or if you travel or work in places where you may be exposed to hepatitis B.  · Haemophilus influenzae type b (Hib) vaccine. You may need this if you have  certain conditions.  Talk to your health care provider about which screenings and vaccines you need and how often you need them.  This information is not intended to replace advice given to you by your health care provider. Make sure you discuss any questions you have with your health care provider.  Document Released: 01/13/2017 Document Revised: 02/07/2019 Document Reviewed: 10/18/2016  [employer] Interactive Patient Education © 2019 [employer]      Hemorrhoids  Hemorrhoids are swollen veins that may develop:  · In the butt (rectum). These are called internal hemorrhoids.  · Around the opening of the butt (anus). These are called external hemorrhoids.  Hemorrhoids can cause pain, itching, or bleeding. Most of the time, they do not cause serious problems. They usually get better with diet changes, lifestyle changes, and other home treatments.  What are the causes?  This condition may be caused by:  · Having trouble pooping (constipation).  · Pushing hard (straining) to poop.  · Watery poop (diarrhea).  · Pregnancy.  · Being very overweight (obese).  · Sitting for long periods of time.  · Heavy lifting or other activity that causes you to strain.  · Anal sex.  · Riding a bike for a long period of time.  What are the signs or symptoms?  Symptoms of this condition include:  · Pain.  · Itching or soreness in the butt.  · Bleeding from the butt.  · Leaking poop.  · Swelling in the area.  · One or more lumps around the opening of your butt.  How is this diagnosed?  A doctor can often diagnose this condition by looking at the affected area. The doctor may also:  · Do an exam that involves feeling the area with a gloved hand (digital rectal exam).  · Examine the area inside your butt using a small tube (anoscope).  · Order blood tests. This may be done if you have lost a lot of blood.  · Have you get a test that involves looking inside the colon using a flexible tube with a camera on the end (sigmoidoscopy or  colonoscopy).  How is this treated?  This condition can usually be treated at home. Your doctor may tell you to change what you eat, make lifestyle changes, or try home treatments. If these do not help, procedures can be done to remove the hemorrhoids or make them smaller. These may involve:  · Placing rubber bands at the base of the hemorrhoids to cut off their blood supply.  · Injecting medicine into the hemorrhoids to shrink them.  · Shining a type of light energy onto the hemorrhoids to cause them to fall off.  · Doing surgery to remove the hemorrhoids or cut off their blood supply.  Follow these instructions at home:  Eating and drinking    · Eat foods that have a lot of fiber in them. These include whole grains, beans, nuts, fruits, and vegetables.  · Ask your doctor about taking products that have added fiber (fibersupplements).  · Reduce the amount of fat in your diet. You can do this by:  ? Eating low-fat dairy products.  ? Eating less red meat.  ? Avoiding processed foods.  · Drink enough fluid to keep your pee (urine) pale yellow.  Managing pain and swelling    · Take a warm-water bath (sitz bath) for 20 minutes to ease pain. Do this 3-4 times a day. You may do this in a bathtub or using a portable sitz bath that fits over the toilet.  · If told, put ice on the painful area. It may be helpful to use ice between your warm baths.  ? Put ice in a plastic bag.  ? Place a towel between your skin and the bag.  ? Leave the ice on for 20 minutes, 2-3 times a day.  General instructions  · Take over-the-counter and prescription medicines only as told by your doctor.  ? Medicated creams and medicines may be used as told.  · Exercise often. Ask your doctor how much and what kind of exercise is best for you.  · Go to the bathroom when you have the urge to poop. Do not wait.  · Avoid pushing too hard when you poop.  · Keep your butt dry and clean. Use wet toilet paper or moist towelettes after pooping.  · Do not sit on  the toilet for a long time.  · Keep all follow-up visits as told by your doctor. This is important.  Contact a doctor if you:  · Have pain and swelling that do not get better with treatment or medicine.  · Have trouble pooping.  · Cannot poop.  · Have pain or swelling outside the area of the hemorrhoids.  Get help right away if you have:  · Bleeding that will not stop.  Summary  · Hemorrhoids are swollen veins in the butt or around the opening of the butt.  · They can cause pain, itching, or bleeding.  · Eat foods that have a lot of fiber in them. These include whole grains, beans, nuts, fruits, and vegetables.  · Take a warm-water bath (sitz bath) for 20 minutes to ease pain. Do this 3-4 times a day.  This information is not intended to replace advice given to you by your health care provider. Make sure you discuss any questions you have with your health care provider.  Document Released: 09/26/2009 Document Revised: 05/09/2019 Document Reviewed: 05/09/2019  [employer] Interactive Patient Education © 2019 [employer]

## 2020-06-03 ENCOUNTER — OFFICE VISIT (OUTPATIENT)
Dept: FAMILY MEDICINE CLINIC | Facility: CLINIC | Age: 50
End: 2020-06-03

## 2020-06-03 VITALS
RESPIRATION RATE: 20 BRPM | SYSTOLIC BLOOD PRESSURE: 136 MMHG | TEMPERATURE: 98 F | WEIGHT: 241.25 LBS | OXYGEN SATURATION: 96 % | DIASTOLIC BLOOD PRESSURE: 94 MMHG | BODY MASS INDEX: 34.54 KG/M2 | HEIGHT: 70 IN | HEART RATE: 69 BPM

## 2020-06-03 DIAGNOSIS — M54.50 ACUTE BILATERAL LOW BACK PAIN WITHOUT SCIATICA: Primary | ICD-10-CM

## 2020-06-03 LAB
BILIRUB BLD-MCNC: NEGATIVE MG/DL
CLARITY, POC: CLEAR
COLOR UR: YELLOW
GLUCOSE UR STRIP-MCNC: NEGATIVE MG/DL
KETONES UR QL: ABNORMAL
LEUKOCYTE EST, POC: NEGATIVE
NITRITE UR-MCNC: NEGATIVE MG/ML
PH UR: 5.5 [PH] (ref 5–8)
PROT UR STRIP-MCNC: NEGATIVE MG/DL
RBC # UR STRIP: NEGATIVE /UL
SP GR UR: 1.03 (ref 1–1.03)
UROBILINOGEN UR QL: NORMAL

## 2020-06-03 PROCEDURE — 81015 MICROSCOPIC EXAM OF URINE: CPT | Performed by: NURSE PRACTITIONER

## 2020-06-03 PROCEDURE — 81003 URINALYSIS AUTO W/O SCOPE: CPT | Performed by: NURSE PRACTITIONER

## 2020-06-03 PROCEDURE — 87086 URINE CULTURE/COLONY COUNT: CPT | Performed by: NURSE PRACTITIONER

## 2020-06-03 PROCEDURE — 99214 OFFICE O/P EST MOD 30 MIN: CPT | Performed by: NURSE PRACTITIONER

## 2020-06-03 RX ORDER — TIZANIDINE 4 MG/1
4 TABLET ORAL NIGHTLY PRN
Qty: 14 TABLET | Refills: 0 | Status: SHIPPED | OUTPATIENT
Start: 2020-06-03 | End: 2020-07-13

## 2020-06-03 RX ORDER — NAPROXEN 500 MG/1
500 TABLET ORAL 2 TIMES DAILY WITH MEALS
Qty: 60 TABLET | Refills: 0 | Status: SHIPPED | OUTPATIENT
Start: 2020-06-03 | End: 2020-07-13

## 2020-06-03 NOTE — PATIENT INSTRUCTIONS
Acute Back Pain, Adult  Acute back pain is sudden and usually short-lived. It is often caused by an injury to the muscles and tissues in the back. The injury may result from:  · A muscle or ligament getting overstretched or torn (strained). Ligaments are tissues that connect bones to each other. Lifting something improperly can cause a back strain.  · Wear and tear (degeneration) of the spinal disks. Spinal disks are circular tissue that provides cushioning between the bones of the spine (vertebrae).  · Twisting motions, such as while playing sports or doing yard work.  · A hit to the back.  · Arthritis.  You may have a physical exam, lab tests, and imaging tests to find the cause of your pain. Acute back pain usually goes away with rest and home care.  Follow these instructions at home:  Managing pain, stiffness, and swelling  · Take over-the-counter and prescription medicines only as told by your health care provider.  · Your health care provider may recommend applying ice during the first 24-48 hours after your pain starts. To do this:  ? Put ice in a plastic bag.  ? Place a towel between your skin and the bag.  ? Leave the ice on for 20 minutes, 2-3 times a day.  · If directed, apply heat to the affected area as often as told by your health care provider. Use the heat source that your health care provider recommends, such as a moist heat pack or a heating pad.  ? Place a towel between your skin and the heat source.  ? Leave the heat on for 20-30 minutes.  ? Remove the heat if your skin turns bright red. This is especially important if you are unable to feel pain, heat, or cold. You have a greater risk of getting burned.  Activity    · Do not stay in bed. Staying in bed for more than 1-2 days can delay your recovery.  · Sit up and stand up straight. Avoid leaning forward when you sit, or hunching over when you stand.  ? If you work at a desk, sit close to it so you do not need to lean over. Keep your chin tucked  "in. Keep your neck drawn back, and keep your elbows bent at a right angle. Your arms should look like the letter \"L.\"  ? Sit high and close to the steering wheel when you drive. Add lower back (lumbar) support to your car seat, if needed.  · Take short walks on even surfaces as soon as you are able. Try to increase the length of time you walk each day.  · Do not sit, drive, or  one place for more than 30 minutes at a time. Sitting or standing for long periods of time can put stress on your back.  · Do not drive or use heavy machinery while taking prescription pain medicine.  · Use proper lifting techniques. When you bend and lift, use positions that put less stress on your back:  ? Bend your knees.  ? Keep the load close to your body.  ? Avoid twisting.  · Exercise regularly as told by your health care provider. Exercising helps your back heal faster and helps prevent back injuries by keeping muscles strong and flexible.  · Work with a physical therapist to make a safe exercise program, as recommended by your health care provider. Do any exercises as told by your physical therapist.  Lifestyle  · Maintain a healthy weight. Extra weight puts stress on your back and makes it difficult to have good posture.  · Avoid activities or situations that make you feel anxious or stressed. Stress and anxiety increase muscle tension and can make back pain worse. Learn ways to manage anxiety and stress, such as through exercise.  General instructions  · Sleep on a firm mattress in a comfortable position. Try lying on your side with your knees slightly bent. If you lie on your back, put a pillow under your knees.  · Follow your treatment plan as told by your health care provider. This may include:  ? Cognitive or behavioral therapy.  ? Acupuncture or massage therapy.  ? Meditation or yoga.  Contact a health care provider if:  · You have pain that is not relieved with rest or medicine.  · You have increasing pain going down " into your legs or buttocks.  · Your pain does not improve after 2 weeks.  · You have pain at night.  · You lose weight without trying.  · You have a fever or chills.  Get help right away if:  · You develop new bowel or bladder control problems.  · You have unusual weakness or numbness in your arms or legs.  · You develop nausea or vomiting.  · You develop abdominal pain.  · You feel faint.  Summary  · Acute back pain is sudden and usually short-lived.  · Use proper lifting techniques. When you bend and lift, use positions that put less stress on your back.  · Take over-the-counter and prescription medicines and apply heat or ice as directed by your health care provider.  This information is not intended to replace advice given to you by your health care provider. Make sure you discuss any questions you have with your health care provider.  Document Released: 12/18/2006 Document Revised: 04/07/2020 Document Reviewed: 08/01/2018  [employer]vier Patient Education © 2020 [employer]

## 2020-06-03 NOTE — PROGRESS NOTES
"Answers for HPI/ROS submitted by the patient on 6/3/2020   What is the primary reason for your visit?: Other  Please describe your symptoms.: Back pain around my kidneys  Have you had these symptoms before?: No  How long have you been having these symptoms?: 1-2 weeks  Please list any medications you are currently taking for this condition.: None  Please describe any probable cause for these symptoms. : Unknown  Subjective   Adolfo Barron is a 49 y.o. male.     [facility] Same Day Appointment    PCP: Dr. 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  Has noted more pain in bilateral back around kidney area and became concerned.  No history of renal stones.  No concern for STD. Does report he recently returned to work and has been doing more lifting/pulling.      Back Pain   This is a new problem. Episode onset: about 2 weeks. The problem occurs daily. The problem has been waxing and waning since onset. The pain is present in the lumbar spine (bilateral lower back/flank area). The quality of the pain is described as aching. The pain does not radiate. The pain is at a severity of 2/10. The symptoms are aggravated by bending, twisting, standing and lying down. Stiffness is present in the morning. Associated symptoms include abdominal pain (had some pressure last week, but this seems to have resolved). Pertinent negatives include no bladder incontinence, bowel incontinence, chest pain, dysuria, fever, headaches, leg pain, numbness, paresis, paresthesias, pelvic pain, perianal numbness, tingling, weakness or weight loss. Treatments tried: increased water and drank cranberry juice. The treatment provided mild relief.        The following portions of the patient's history were reviewed and updated as appropriate: allergies, current medications, past medical history, past social history, past " "surgical history and problem list.    Review of Systems   Constitutional: Negative.  Negative for fever and unexpected weight loss.   HENT: Negative.    Respiratory: Negative.    Cardiovascular: Negative.  Negative for chest pain.   Gastrointestinal: Positive for abdominal pain (had some pressure last week, but this seems to have resolved). Negative for blood in stool, bowel incontinence, constipation, diarrhea and nausea.   Genitourinary: Positive for flank pain (pain to bilateral back). Negative for difficulty urinating, discharge, dysuria, frequency, hematuria, pelvic pain and urinary incontinence.   Musculoskeletal: Positive for back pain.   Skin: Negative for rash.   Neurological: Negative for dizziness, tingling, weakness, numbness, headache and paresthesias.     /94 (BP Location: Left arm, Patient Position: Sitting, Cuff Size: Large Adult)   Pulse 69   Temp 98 °F (36.7 °C) (Tympanic)   Resp 20   Ht 177.8 cm (70\")   Wt 109 kg (241 lb 4 oz)   SpO2 96%   BMI 34.62 kg/m²     Objective   Physical Exam   Constitutional: He is oriented to person, place, and time. He appears well-developed and well-nourished. No distress.   Cardiovascular: Normal rate and regular rhythm.   Pulmonary/Chest: Effort normal and breath sounds normal. He has no wheezes.   Abdominal: Soft. Bowel sounds are normal. There is no tenderness. There is no rebound and no guarding.   Genitourinary:   Genitourinary Comments: No flank pain noted   Musculoskeletal:        Lumbar back: He exhibits tenderness. He exhibits normal range of motion and no spasm.   Pain worsened with twisting/bending     Neurological: He is alert and oriented to person, place, and time.   Skin: Skin is warm and dry. No rash noted.   Nursing note and vitals reviewed.    Recent Results (from the past 24 hour(s))   POCT urinalysis dipstick, automated    Collection Time: 06/03/20 11:43 AM   Result Value Ref Range    Color Yellow Yellow, Straw, Dark Yellow, Shira    " Clarity, UA Clear Clear    Specific Gravity  1.030 1.005 - 1.030    pH, Urine 5.5 5.0 - 8.0    Leukocytes Negative Negative    Nitrite, UA Negative Negative    Protein, POC Negative Negative mg/dL    Glucose, UA Negative Negative, 1000 mg/dL (3+) mg/dL    Ketones, UA Trace (A) Negative    Urobilinogen, UA Normal Normal    Bilirubin Negative Negative    Blood, UA Negative Negative     No Images in the past 120 days found..      Assessment/Plan   Adolfo was seen today for back pain.    Diagnoses and all orders for this visit:    Acute bilateral low back pain without sciatica  -     naproxen (Naprosyn) 500 MG tablet; Take 1 tablet by mouth 2 (Two) Times a Day With Meals.  -     tiZANidine (ZANAFLEX) 4 MG tablet; Take 1 tablet by mouth At Night As Needed for Muscle Spasms.  -     POCT urinalysis dipstick, automated  -     Urine Culture - Urine, Urine, Clean Catch  -     Urinalysis, Microscopic Only - Urine, Clean Catch    Adult BMI 34.0-34.9 kg/sq m      UA unremarkable--micro and culture pending.  Will call if abnormal.    Rx for Naproxen, Zanaflex at night for the next 10-14 days as needed.   Moist heat or ice as needed.   Encouraged stretching exercises for back.   Stay well hydrated, especially water, limit caffeine.      B/P slightly elevated today, but previous readings have been WNL.  Will need to continue to monitor with PCP.     Patient's Body mass index is 34.62 kg/m². BMI is above normal parameters. Recommendations include: referral to primary care.      See PCP or RTC if symptoms persist/worsen  See PCP for routine f/u visit and management of chronic medical conditions    Declines RTW note      This document has been electronically signed by LIBORIO Gooden on Amber 3, 2020 11:55,.

## 2020-06-05 LAB
BACTERIA UR QL AUTO: NORMAL /HPF
HYALINE CASTS UR QL AUTO: NORMAL /LPF
RBC # UR: NORMAL /HPF
REF LAB TEST METHOD: NORMAL
SQUAMOUS #/AREA URNS HPF: NORMAL /HPF
WBC UR QL AUTO: NORMAL /HPF

## 2020-06-06 LAB — BACTERIA SPEC AEROBE CULT: NO GROWTH

## 2020-07-13 ENCOUNTER — OFFICE VISIT (OUTPATIENT)
Dept: FAMILY MEDICINE CLINIC | Facility: CLINIC | Age: 50
End: 2020-07-13

## 2020-07-13 VITALS
HEIGHT: 70 IN | TEMPERATURE: 98 F | BODY MASS INDEX: 33.33 KG/M2 | DIASTOLIC BLOOD PRESSURE: 88 MMHG | WEIGHT: 232.8 LBS | OXYGEN SATURATION: 98 % | HEART RATE: 69 BPM | SYSTOLIC BLOOD PRESSURE: 120 MMHG

## 2020-07-13 DIAGNOSIS — M54.31 SCIATICA OF RIGHT SIDE: ICD-10-CM

## 2020-07-13 DIAGNOSIS — N42.9 PROSTATE DISORDER: ICD-10-CM

## 2020-07-13 DIAGNOSIS — N41.0 ACUTE PROSTATITIS: ICD-10-CM

## 2020-07-13 DIAGNOSIS — N50.811 PAIN IN RIGHT TESTICLE: ICD-10-CM

## 2020-07-13 PROCEDURE — 99214 OFFICE O/P EST MOD 30 MIN: CPT | Performed by: FAMILY MEDICINE

## 2020-07-13 RX ORDER — SULFAMETHOXAZOLE AND TRIMETHOPRIM 800; 160 MG/1; MG/1
1 TABLET ORAL 2 TIMES DAILY
Qty: 10 TABLET | Refills: 0 | Status: SHIPPED | OUTPATIENT
Start: 2020-07-13 | End: 2020-09-11

## 2020-07-13 NOTE — PROGRESS NOTES
Subjective   Adolfo Barron is a 49 y.o. muscular AA male non-smoker with seasonal Allergies, H/O R sciatica, hemorrhoids see PMH/PSH. Pt presents for exam, to discuss acute health problem, Tx and F/U plan.     ' Back at work. Concerned FHx of prostate cancer, prostate problem. Having groin ache over last few weeks, urinary stream OK, just pressure , aching sensation. Low back pain improved. Hemorrhoids improved'    Obesity   This is a chronic problem. The current episode started more than 1 year ago. The problem occurs daily. The problem has been unchanged. The symptoms are aggravated by eating. Treatments tried: Exercise. The treatment provided significant relief.   Hemorrhoids   This is a recurrent problem. The current episode started 1 to 4 weeks ago. The problem occurs intermittently. The problem has been gradually worsening. Associated symptoms comments: Rectal Pain. Exacerbated by: BM. The treatment provided no relief.   Groin Pain   The patient's primary symptoms include pelvic pain. This is a recurrent problem. The current episode started 1 to 4 weeks ago. The problem occurs daily. The problem has been waxing and waning. The pain is mild. Associated symptoms comments: Groin pressure. He has tried nothing for the symptoms. The treatment provided no relief. No, his partner does not have an STD.        The following portions of the patient's history were reviewed and updated as appropriate: allergies, current medications, past family history, past medical history, past social history, past surgical history and problem list.    Review of Systems   Constitutional: Negative for activity change, appetite change and unexpected weight change.   HENT: Negative for dental problem, drooling, ear discharge, ear pain, facial swelling, hearing loss, mouth sores, nosebleeds, postnasal drip, rhinorrhea, sinus pressure, sneezing, tinnitus, trouble swallowing and voice change.    Eyes: Negative for pain and visual  disturbance.        Eye exam Vision works   Respiratory: Negative for apnea, choking, chest tightness, wheezing and stridor.    Cardiovascular: Negative for palpitations and leg swelling.   Gastrointestinal: Positive for hemorrhoids and rectal pain. Negative for abdominal distention, anal bleeding and blood in stool.        Hemorhoids       Endocrine: Negative for cold intolerance, heat intolerance, polydipsia, polyphagia and polyuria.   Genitourinary: Positive for pelvic pain. Negative for decreased urine volume, difficulty urinating, enuresis, genital sores, hematuria and penile swelling.        Pain from back shoots into R testicle has resolved   Musculoskeletal: Negative for gait problem and neck stiffness.        No back pain today  L shoulder and neck pain   Skin: Negative for color change, pallor and wound.   Allergic/Immunologic: Negative for environmental allergies, food allergies and immunocompromised state.   Neurological: Negative for dizziness, tremors, seizures, syncope, facial asymmetry, speech difficulty and light-headedness.   Hematological: Negative for adenopathy. Does not bruise/bleed easily.   Psychiatric/Behavioral: Negative for agitation, behavioral problems, confusion, decreased concentration, dysphoric mood, hallucinations, self-injury, sleep disturbance and suicidal ideas. The patient is not nervous/anxious and is not hyperactive.        Objective   Physical Exam   Constitutional: He is oriented to person, place, and time. He appears well-developed and well-nourished.   HENT:   Head: Normocephalic.   Right Ear: External ear normal.   Left Ear: External ear normal.   Nose: Nose normal.   Mouth/Throat: Oropharynx is clear and moist. No oropharyngeal exudate.   Eyes: Pupils are equal, round, and reactive to light. Conjunctivae and EOM are normal. Right eye exhibits no discharge. Left eye exhibits no discharge. No scleral icterus.   Neck: Normal range of motion. Neck supple. No JVD present. No  tracheal deviation present. No thyromegaly present.   Cardiovascular: Normal rate, regular rhythm, normal heart sounds and intact distal pulses. Exam reveals no gallop and no friction rub.   No murmur heard.  Pulmonary/Chest: Effort normal and breath sounds normal. No stridor. No respiratory distress. He has no wheezes. He has no rales. He exhibits no tenderness.   Abdominal: Soft. Bowel sounds are normal. He exhibits no distension and no mass. There is no tenderness. There is no rebound and no guarding. No hernia.   Musculoskeletal: Normal range of motion. He exhibits no edema, tenderness or deformity.   Lymphadenopathy:     He has no cervical adenopathy.   Neurological: He is alert and oriented to person, place, and time. He has normal reflexes. He displays normal reflexes. No cranial nerve deficit. He exhibits normal muscle tone. Coordination normal.   Skin: Skin is warm and dry. No rash noted. No erythema. No pallor.   Psychiatric: He has a normal mood and affect. His behavior is normal. Judgment and thought content normal.   Nursing note and vitals reviewed.      Assessment/Plan   Adolfo was seen today for shoulder pain and back pain.    Diagnoses and all orders for this visit:    Acute prostatitis  -     sulfamethoxazole-trimethoprim (BACTRIM DS,SEPTRA DS) 800-160 MG per tablet; Take 1 tablet by mouth 2 (Two) Times a Day.    Prostate disorder  -     Ambulatory Referral to Urology    Sciatica of right side    Pain in right testicle    Discussed exam, Concern for FHx of prostate problems, trial of Abx to see if gives any relief of symptoms. Discussed referral for Urology evaluation. Discussed low back pain. Discussed F/U here.        This document has been electronically signed by Gilles Canales MD

## 2020-07-14 PROBLEM — N42.9 PROSTATE DISORDER: Status: ACTIVE | Noted: 2020-07-14

## 2020-07-14 PROBLEM — N41.0 ACUTE PROSTATITIS: Status: ACTIVE | Noted: 2020-07-14

## 2020-08-12 NOTE — PROGRESS NOTES
"Subjective    Mr. Barron is 49 y.o. male    Chief Complaint: acute prostatitis    History of Present Illness  49-year-old male new patient referred by Dr. Canales for history of feeling \"congestion down there\".  He is feeling much better after completing a course of antibiotics.  Quality now painless.  Associated symptoms he denies dysuria, hematuria, or flank pain.  Context he states his grandfather and he thinks his father had prostate cancer.  PSA September of last year normal.  Location behind prostate and radiating toward right testicle.    The following portions of the patient's history were reviewed and updated as appropriate: allergies, current medications, past family history, past medical history, past social history, past surgical history and problem list.    Review of Systems   Constitutional: Negative for appetite change and fever.   HENT: Negative for hearing loss and sore throat.    Eyes: Negative for pain and redness.   Respiratory: Negative for cough and shortness of breath.    Cardiovascular: Negative for chest pain and leg swelling.   Gastrointestinal: Negative for anal bleeding, nausea and vomiting.   Endocrine: Negative for cold intolerance and heat intolerance.   Genitourinary: Negative for dysuria, flank pain, frequency, hematuria and urgency.   Musculoskeletal: Negative for joint swelling and myalgias.   Skin: Negative for color change and rash.   Allergic/Immunologic: Negative for immunocompromised state.   Neurological: Negative for dizziness and speech difficulty.   Hematological: Negative for adenopathy. Does not bruise/bleed easily.   Psychiatric/Behavioral: Negative for dysphoric mood and suicidal ideas.         Current Outpatient Medications:   •  sulfamethoxazole-trimethoprim (BACTRIM DS,SEPTRA DS) 800-160 MG per tablet, Take 1 tablet by mouth 2 (Two) Times a Day., Disp: 10 tablet, Rfl: 0    Past Medical History:   Diagnosis Date   • Acute bronchitis    • Acute otitis media    • Acute " sinusitis    • Allergic rhinitis    • Biceps femoris tendinitis    • Candidiasis of skin and nails    • Chronic allergic conjunctivitis    • Cough    • Upper respiratory infection        No past surgical history on file.    Social History     Socioeconomic History   • Marital status:      Spouse name: Not on file   • Number of children: Not on file   • Years of education: Not on file   • Highest education level: Not on file   Tobacco Use   • Smoking status: Never Smoker   • Smokeless tobacco: Never Used   Substance and Sexual Activity   • Alcohol use: No   • Drug use: Defer   • Sexual activity: Defer       No family history on file.    Objective    There were no vitals taken for this visit.    Physical Exam  Constitutional: Well nourished, Well developed; No apparent distress.  His vital signs are reviewed  Psychiatric: Appropriate affect; Alert and oriented  Eyes: Unremarkable  Musculoskeletal: Normal gait and station  GI: Abdomen is soft, non-tender  Respiratory: No distress; Unlabored movement; No accessory musculature needed with symmetric movements  Skin: No pallor or diaphoresis  ; Penis and testicles are normal; Prostate 25 mL without nodule      Results for orders placed or performed in visit on 06/03/20   Urine Culture - Urine, Urine, Clean Catch   Result Value Ref Range    Urine Culture No growth    Urinalysis, Microscopic Only - Urine, Clean Catch   Result Value Ref Range    RBC, UA 0-2 None Seen, 0-2 /HPF    WBC, UA 0-2 None Seen, 0-2 /HPF    Bacteria, UA None Seen None Seen /HPF    Squamous Epithelial Cells, UA 0-2 None Seen, 0-2 /HPF    Hyaline Casts, UA None Seen None Seen /LPF    Methodology Automated Microscopy    POCT urinalysis dipstick, automated   Result Value Ref Range    Color Yellow Yellow, Straw, Dark Yellow, Shira    Clarity, UA Clear Clear    Specific Gravity  1.030 1.005 - 1.030    pH, Urine 5.5 5.0 - 8.0    Leukocytes Negative Negative    Nitrite, UA Negative Negative     Protein, POC Negative Negative mg/dL    Glucose, UA Negative Negative, 1000 mg/dL (3+) mg/dL    Ketones, UA Trace (A) Negative    Urobilinogen, UA Normal Normal    Bilirubin Negative Negative    Blood, UA Negative Negative     Assessment and Plan    Diagnoses and all orders for this visit:    Benign prostatic hyperplasia without lower urinary tract symptoms  -     POC Urinalysis Dipstick, Multipro    Family history of prostate cancer      Mildly enlarged prostate.  Family history of prostate cancer.  Clinical improved after completing a course of antibiotics for what sounded like a bout of prostatitis.  I recommended scheduled NSAIDs and warm sits baths along with regular ejaculations.  He will get his PSA checked with his annual labs next month as planned with his primary care clinic.  He will follow-up here on an as-needed basis.          This document has been signed by DELFIN Navas MD on August 14, 2020 17:32

## 2020-08-14 ENCOUNTER — OFFICE VISIT (OUTPATIENT)
Dept: UROLOGY | Facility: CLINIC | Age: 50
End: 2020-08-14

## 2020-08-14 VITALS — WEIGHT: 227.2 LBS | TEMPERATURE: 98.4 F | HEIGHT: 70 IN | BODY MASS INDEX: 32.53 KG/M2

## 2020-08-14 DIAGNOSIS — N40.0 BENIGN PROSTATIC HYPERPLASIA WITHOUT LOWER URINARY TRACT SYMPTOMS: Primary | ICD-10-CM

## 2020-08-14 DIAGNOSIS — Z80.42 FAMILY HISTORY OF PROSTATE CANCER: ICD-10-CM

## 2020-08-14 LAB
BILIRUB BLD-MCNC: NEGATIVE MG/DL
CLARITY, POC: CLEAR
COLOR UR: YELLOW
GLUCOSE UR STRIP-MCNC: NEGATIVE MG/DL
KETONES UR QL: NEGATIVE
LEUKOCYTE EST, POC: NEGATIVE
NITRITE UR-MCNC: NEGATIVE MG/ML
PH UR: 5.5 [PH] (ref 5–8)
PROT UR STRIP-MCNC: NEGATIVE MG/DL
RBC # UR STRIP: NEGATIVE /UL
SP GR UR: 1.02 (ref 1–1.03)
UROBILINOGEN UR QL: NORMAL

## 2020-08-14 PROCEDURE — 81003 URINALYSIS AUTO W/O SCOPE: CPT | Performed by: UROLOGY

## 2020-08-14 PROCEDURE — 99203 OFFICE O/P NEW LOW 30 MIN: CPT | Performed by: UROLOGY

## 2020-09-11 ENCOUNTER — LAB (OUTPATIENT)
Dept: LAB | Facility: HOSPITAL | Age: 50
End: 2020-09-11

## 2020-09-11 ENCOUNTER — OFFICE VISIT (OUTPATIENT)
Dept: FAMILY MEDICINE CLINIC | Facility: CLINIC | Age: 50
End: 2020-09-11

## 2020-09-11 VITALS
TEMPERATURE: 97.1 F | DIASTOLIC BLOOD PRESSURE: 80 MMHG | HEART RATE: 83 BPM | HEIGHT: 70 IN | OXYGEN SATURATION: 98 % | SYSTOLIC BLOOD PRESSURE: 128 MMHG | BODY MASS INDEX: 34.41 KG/M2 | WEIGHT: 240.4 LBS

## 2020-09-11 DIAGNOSIS — J30.1 SEASONAL ALLERGIC RHINITIS DUE TO POLLEN: ICD-10-CM

## 2020-09-11 DIAGNOSIS — Z00.00 ROUTINE MEDICAL EXAM: ICD-10-CM

## 2020-09-11 DIAGNOSIS — M25.512 STERNOCLAVICULAR JOINT PAIN, LEFT: ICD-10-CM

## 2020-09-11 DIAGNOSIS — K64.8 OTHER HEMORRHOIDS: ICD-10-CM

## 2020-09-11 DIAGNOSIS — G89.29 CHRONIC BILATERAL LOW BACK PAIN WITHOUT SCIATICA: ICD-10-CM

## 2020-09-11 DIAGNOSIS — N42.9 PROSTATE DISORDER: ICD-10-CM

## 2020-09-11 DIAGNOSIS — M54.50 CHRONIC BILATERAL LOW BACK PAIN WITHOUT SCIATICA: ICD-10-CM

## 2020-09-11 PROCEDURE — 80061 LIPID PANEL: CPT

## 2020-09-11 PROCEDURE — 99214 OFFICE O/P EST MOD 30 MIN: CPT | Performed by: FAMILY MEDICINE

## 2020-09-11 PROCEDURE — 84443 ASSAY THYROID STIM HORMONE: CPT

## 2020-09-11 PROCEDURE — 80053 COMPREHEN METABOLIC PANEL: CPT

## 2020-09-11 PROCEDURE — 85025 COMPLETE CBC W/AUTO DIFF WBC: CPT

## 2020-09-11 PROCEDURE — 81003 URINALYSIS AUTO W/O SCOPE: CPT

## 2020-09-11 NOTE — PROGRESS NOTES
Subjective    Adolfo Barron is a 49 y.o. muscular AA male non-smoker with seasonal Allergies, H/O R sciatica, hemorrhoids see PMH/PSH. Pt presents for exam, to discuss health problems, Tx and F/U plan.     ' Saw Urologist, agreed recent problem was Prostatitis, resolved after Abx. Low back pain continues to flare at time'.    Obesity  This is a chronic problem. The current episode started more than 1 year ago. The problem occurs daily. The problem has been unchanged. The symptoms are aggravated by eating. Treatments tried: Exercise. The treatment provided significant relief.   Hemorrhoids  This is a recurrent problem. The current episode started 1 to 4 weeks ago. The problem occurs intermittently. The problem has been gradually improving. Associated symptoms comments: Rectal Pain. Exacerbated by: BM. Treatments tried: Hemorrhoidal oint. The treatment provided moderate relief.   Groin Pain  This is a recurrent problem. The current episode started more than 1 month ago. The problem has been resolved. The pain is mild. Associated symptoms comments: Groin pressure. He has tried antibiotics for the symptoms. The treatment provided significant relief. No, his partner does not have an STD.   Back Pain  This is a chronic problem. The current episode started more than 1 year ago. The problem occurs intermittently. The problem has been waxing and waning since onset. The pain is present in the lumbar spine. He has tried analgesics and NSAIDs for the symptoms. The treatment provided mild relief.        The following portions of the patient's history were reviewed and updated as appropriate: allergies, current medications, past family history, past medical history, past social history, past surgical history and problem list.    Review of Systems   Constitutional: Negative for activity change, appetite change and unexpected weight change.   HENT: Negative for dental problem, drooling, ear discharge, ear pain, facial swelling,  hearing loss, mouth sores, nosebleeds, postnasal drip, rhinorrhea, sinus pressure, sneezing, tinnitus, trouble swallowing and voice change.    Eyes: Negative for pain and visual disturbance.        Eye exam Vision works   Respiratory: Negative for apnea, choking, chest tightness, wheezing and stridor.    Cardiovascular: Negative for palpitations and leg swelling.   Gastrointestinal: Positive for hemorrhoids and rectal pain. Negative for abdominal distention, anal bleeding and blood in stool.        Hemorhoids       Endocrine: Negative for cold intolerance, heat intolerance, polydipsia, polyphagia and polyuria.   Genitourinary: Negative for decreased urine volume, difficulty urinating, enuresis, genital sores, hematuria and penile swelling.        Pain from back shoots into R testicle has resolved   Musculoskeletal: Positive for back pain. Negative for gait problem and neck stiffness.        No back pain today  L shoulder and neck pain   Skin: Negative for color change, pallor and wound.   Allergic/Immunologic: Negative for environmental allergies, food allergies and immunocompromised state.   Neurological: Negative for dizziness, tremors, seizures, syncope, facial asymmetry, speech difficulty and light-headedness.   Hematological: Negative for adenopathy. Does not bruise/bleed easily.   Psychiatric/Behavioral: Negative for agitation, behavioral problems, confusion, decreased concentration, dysphoric mood, hallucinations, self-injury, sleep disturbance and suicidal ideas. The patient is not nervous/anxious and is not hyperactive.        Objective   Physical Exam   Constitutional: He is oriented to person, place, and time. He appears well-developed.   HENT:   Head: Normocephalic.   Right Ear: External ear normal.   Left Ear: External ear normal.   Nose: Nose normal.   Mouth/Throat: No oropharyngeal exudate.   Eyes: Pupils are equal, round, and reactive to light. Conjunctivae are normal. Right eye exhibits no  discharge. Left eye exhibits no discharge. No scleral icterus.   Neck: Normal range of motion. Neck supple. No JVD present. No tracheal deviation present. No thyromegaly present.   Cardiovascular: Normal rate, regular rhythm and normal heart sounds. Exam reveals no gallop and no friction rub.   No murmur heard.  Pulmonary/Chest: Effort normal and breath sounds normal. No stridor. No respiratory distress. He has no wheezes. He has no rales. He exhibits no tenderness.   Abdominal: Soft. Bowel sounds are normal. He exhibits no distension and no mass. There is no abdominal tenderness. There is no rebound and no guarding. No hernia.   Musculoskeletal: Normal range of motion. No tenderness or deformity.   Lymphadenopathy:     He has no cervical adenopathy.   Neurological: He is alert and oriented to person, place, and time. He has normal reflexes. He displays normal reflexes. No cranial nerve deficit. He exhibits normal muscle tone. Coordination normal.   Skin: Skin is warm and dry. No rash noted. No erythema. No pallor.   Psychiatric: His behavior is normal. Judgment and thought content normal.   Nursing note and vitals reviewed.      Assessment/Plan   Discussed exam, health problems, meds, indications, recent evaluation with Urology. Discussed re checking x-ray of lumbar spine. Discussed USPSTF recommendations. Discussed F/U plans.        This document has been electronically signed by Gilles Canales MD

## 2020-09-12 LAB
ALBUMIN SERPL-MCNC: 4.1 G/DL (ref 3.5–5.2)
ALBUMIN/GLOB SERPL: 1.6 G/DL
ALP SERPL-CCNC: 60 U/L (ref 39–117)
ALT SERPL W P-5'-P-CCNC: 26 U/L (ref 1–41)
ANION GAP SERPL CALCULATED.3IONS-SCNC: 8.6 MMOL/L (ref 5–15)
AST SERPL-CCNC: 21 U/L (ref 1–40)
BASOPHILS # BLD AUTO: 0.03 10*3/MM3 (ref 0–0.2)
BASOPHILS NFR BLD AUTO: 0.9 % (ref 0–1.5)
BILIRUB SERPL-MCNC: 0.4 MG/DL (ref 0–1.2)
BILIRUB UR QL STRIP: NEGATIVE
BUN SERPL-MCNC: 12 MG/DL (ref 6–20)
BUN/CREAT SERPL: 10.6 (ref 7–25)
CALCIUM SPEC-SCNC: 8.8 MG/DL (ref 8.6–10.5)
CHLORIDE SERPL-SCNC: 107 MMOL/L (ref 98–107)
CHOLEST SERPL-MCNC: 206 MG/DL (ref 0–200)
CLARITY UR: CLEAR
CO2 SERPL-SCNC: 23.4 MMOL/L (ref 22–29)
COLOR UR: YELLOW
CREAT SERPL-MCNC: 1.13 MG/DL (ref 0.76–1.27)
DEPRECATED RDW RBC AUTO: 43.1 FL (ref 37–54)
EOSINOPHIL # BLD AUTO: 0.1 10*3/MM3 (ref 0–0.4)
EOSINOPHIL NFR BLD AUTO: 3.2 % (ref 0.3–6.2)
ERYTHROCYTE [DISTWIDTH] IN BLOOD BY AUTOMATED COUNT: 13.2 % (ref 12.3–15.4)
GFR SERPL CREATININE-BSD FRML MDRD: 83 ML/MIN/1.73
GLOBULIN UR ELPH-MCNC: 2.6 GM/DL
GLUCOSE SERPL-MCNC: 79 MG/DL (ref 65–99)
GLUCOSE UR STRIP-MCNC: NEGATIVE MG/DL
HCT VFR BLD AUTO: 47.3 % (ref 37.5–51)
HDLC SERPL-MCNC: 54 MG/DL (ref 40–60)
HGB BLD-MCNC: 16.1 G/DL (ref 13–17.7)
HGB UR QL STRIP.AUTO: NEGATIVE
IMM GRANULOCYTES # BLD AUTO: 0.01 10*3/MM3 (ref 0–0.05)
IMM GRANULOCYTES NFR BLD AUTO: 0.3 % (ref 0–0.5)
KETONES UR QL STRIP: NEGATIVE
LDLC SERPL CALC-MCNC: 135 MG/DL (ref 0–100)
LDLC/HDLC SERPL: 2.5 {RATIO}
LEUKOCYTE ESTERASE UR QL STRIP.AUTO: NEGATIVE
LYMPHOCYTES # BLD AUTO: 1.27 10*3/MM3 (ref 0.7–3.1)
LYMPHOCYTES NFR BLD AUTO: 40.1 % (ref 19.6–45.3)
MCH RBC QN AUTO: 30.1 PG (ref 26.6–33)
MCHC RBC AUTO-ENTMCNC: 34 G/DL (ref 31.5–35.7)
MCV RBC AUTO: 88.4 FL (ref 79–97)
MONOCYTES # BLD AUTO: 0.38 10*3/MM3 (ref 0.1–0.9)
MONOCYTES NFR BLD AUTO: 12 % (ref 5–12)
NEUTROPHILS NFR BLD AUTO: 1.38 10*3/MM3 (ref 1.7–7)
NEUTROPHILS NFR BLD AUTO: 43.5 % (ref 42.7–76)
NITRITE UR QL STRIP: NEGATIVE
NRBC BLD AUTO-RTO: 0 /100 WBC (ref 0–0.2)
PH UR STRIP.AUTO: 6 [PH] (ref 5–8)
PLATELET # BLD AUTO: 252 10*3/MM3 (ref 140–450)
PMV BLD AUTO: 11 FL (ref 6–12)
POTASSIUM SERPL-SCNC: 4.2 MMOL/L (ref 3.5–5.2)
PROT SERPL-MCNC: 6.7 G/DL (ref 6–8.5)
PROT UR QL STRIP: NEGATIVE
RBC # BLD AUTO: 5.35 10*6/MM3 (ref 4.14–5.8)
SODIUM SERPL-SCNC: 139 MMOL/L (ref 136–145)
SP GR UR STRIP: 1.02 (ref 1–1.03)
TRIGL SERPL-MCNC: 85 MG/DL (ref 0–150)
TSH SERPL DL<=0.05 MIU/L-ACNC: 1.43 UIU/ML (ref 0.27–4.2)
UROBILINOGEN UR QL STRIP: NORMAL
VLDLC SERPL-MCNC: 17 MG/DL
WBC # BLD AUTO: 3.17 10*3/MM3 (ref 3.4–10.8)

## 2020-09-13 PROBLEM — M25.512 STERNOCLAVICULAR JOINT PAIN, LEFT: Status: ACTIVE | Noted: 2020-09-13

## 2020-09-13 PROBLEM — G89.29 CHRONIC BILATERAL LOW BACK PAIN WITHOUT SCIATICA: Status: ACTIVE | Noted: 2020-09-13

## 2020-09-13 PROBLEM — Z00.00 ROUTINE MEDICAL EXAM: Status: ACTIVE | Noted: 2020-09-13

## 2020-09-13 PROBLEM — M54.50 CHRONIC BILATERAL LOW BACK PAIN WITHOUT SCIATICA: Status: ACTIVE | Noted: 2020-09-13

## 2020-09-14 ENCOUNTER — TELEPHONE (OUTPATIENT)
Dept: FAMILY MEDICINE CLINIC | Facility: CLINIC | Age: 50
End: 2020-09-14

## 2020-09-14 NOTE — TELEPHONE ENCOUNTER
----- Message from Gilles Canales MD sent at 9/13/2020  6:47 PM CDT -----  WBC are borderline low. Cholesterol is elevated, Otherwise labs are OK will recheck at next visit.

## 2021-02-26 ENCOUNTER — OFFICE VISIT (OUTPATIENT)
Dept: FAMILY MEDICINE CLINIC | Facility: CLINIC | Age: 51
End: 2021-02-26

## 2021-02-26 VITALS
SYSTOLIC BLOOD PRESSURE: 120 MMHG | TEMPERATURE: 96.9 F | BODY MASS INDEX: 32.96 KG/M2 | HEART RATE: 73 BPM | HEIGHT: 70 IN | DIASTOLIC BLOOD PRESSURE: 70 MMHG | WEIGHT: 230.2 LBS | OXYGEN SATURATION: 98 %

## 2021-02-26 DIAGNOSIS — R10.11 RIGHT UPPER QUADRANT ABDOMINAL PAIN: Primary | ICD-10-CM

## 2021-02-26 PROCEDURE — 99214 OFFICE O/P EST MOD 30 MIN: CPT | Performed by: FAMILY MEDICINE

## 2021-02-26 NOTE — PROGRESS NOTES
Answers for HPI/ROS submitted by the patient on 2/26/2021   Abdominal pain  What is the primary reason for your visit?: Abdominal Pain  Chronicity: recurrent  Onset: 1 to 4 weeks ago  Onset quality: gradual  Frequency: daily  Episode duration: 2 hours  Progression since onset: gradually worsening  Pain location: RUQ  Pain - numeric: 4/10  Pain quality: aching  Radiates to: back  anorexia: No  arthralgias: No  belching: No  constipation: No  diarrhea: No  dysuria: No  fever: No  flatus: No  frequency: No  headaches: No  hematochezia: No  hematuria: No  melena: No  myalgias: No  nausea: No  weight loss: No  vomiting: No  Aggravated by: movement  Relieved by: movement

## 2021-02-26 NOTE — PROGRESS NOTES
Chief Complaint  Pain (right side, chest wall area)    Subjective        Adolfo Barron is a 49 y.o. muscular AA male non-smoker with seasonal Allergies, H/O R sciatica, hemorrhoids see PMH/PSH. Pt presents for exam, to discuss acute health problems, Tx and F/U plan.      Adolfo Barron presents to [facility]     Pain  This is a new (3 weeks) problem. The problem occurs daily. The problem has been waxing and waning. Associated symptoms include abdominal pain. Pertinent negatives include no anorexia, arthralgias, fever, headaches, myalgias, nausea or vomiting. Exacerbated by: Worse in morning. He has tried nothing for the symptoms. The treatment provided no relief.   Abdominal Pain  This is a recurrent problem. The current episode started 1 to 4 weeks ago. The onset quality is gradual. The problem occurs daily. The most recent episode lasted 2 hours. The problem has been gradually worsening. The pain is located in the RUQ. The pain is at a severity of 4/10. The quality of the pain is aching. The abdominal pain radiates to the back. Pertinent negatives include no anorexia, arthralgias, belching, diarrhea, dysuria, fever, flatus, frequency, headaches, hematochezia, hematuria, melena, myalgias, nausea, vomiting or weight loss. The pain is aggravated by movement. The pain is relieved by movement. He has tried nothing for the symptoms. The treatment provided no relief.       Review of Systems   Constitutional: Negative for fever and unexpected weight loss.   HENT: Negative.    Eyes: Negative.    Respiratory: Negative.    Gastrointestinal: Positive for abdominal pain. Negative for anorexia, diarrhea, flatus, hematochezia, melena, nausea and vomiting.   Endocrine: Negative.    Genitourinary: Negative for dysuria, frequency and hematuria.   Musculoskeletal: Negative for arthralgias and myalgias.   Skin: Negative.    Hematological: Negative.    Psychiatric/Behavioral: Negative.   "    Objective   Vital Signs:   /70 (BP Location: Right arm, Patient Position: Sitting, Cuff Size: Adult)   Pulse 73   Temp 96.9 °F (36.1 °C) (Temporal)   Ht 177.8 cm (70\")   Wt 104 kg (230 lb 3.2 oz)   SpO2 98%   BMI 33.03 kg/m²     Physical Exam  Vitals signs and nursing note reviewed.   Constitutional:       General: He is not in acute distress.     Appearance: Normal appearance. He is well-developed. He is not ill-appearing, toxic-appearing or diaphoretic.   HENT:      Head: Normocephalic.      Right Ear: Tympanic membrane, ear canal and external ear normal. There is no impacted cerumen.      Left Ear: Tympanic membrane, ear canal and external ear normal. There is no impacted cerumen.      Nose: Nose normal. No congestion or rhinorrhea.   Eyes:      General: No scleral icterus.        Right eye: No discharge.         Left eye: No discharge.      Conjunctiva/sclera: Conjunctivae normal.      Pupils: Pupils are equal, round, and reactive to light.   Neck:      Thyroid: No thyromegaly.      Vascular: No JVD.      Trachea: No tracheal deviation.   Cardiovascular:      Rate and Rhythm: Normal rate and regular rhythm.      Heart sounds: Normal heart sounds. No murmur. No friction rub. No gallop.    Pulmonary:      Effort: Pulmonary effort is normal. No respiratory distress.      Breath sounds: Normal breath sounds. No stridor. No wheezing, rhonchi or rales.   Chest:      Chest wall: No tenderness.   Abdominal:      General: Bowel sounds are normal. There is no distension.      Palpations: Abdomen is soft. There is no mass.      Tenderness: There is no abdominal tenderness. There is no right CVA tenderness, left CVA tenderness, guarding or rebound.      Hernia: No hernia is present.      Comments: No tenderness over liver, no CVA tenderness.    Musculoskeletal: Normal range of motion.         General: No tenderness or deformity.   Lymphadenopathy:      Cervical: No cervical adenopathy.   Skin:     General: " Skin is warm and dry.      Coloration: Skin is not pale.      Findings: No erythema or rash.   Neurological:      Mental Status: He is alert and oriented to person, place, and time.      Cranial Nerves: No cranial nerve deficit.      Motor: No abnormal muscle tone.      Coordination: Coordination normal.      Deep Tendon Reflexes: Reflexes are normal and symmetric. Reflexes normal.   Psychiatric:         Mood and Affect: Mood normal.         Behavior: Behavior normal.         Thought Content: Thought content normal.         Judgment: Judgment normal.        Result Review :                 Assessment and Plan    There are no diagnoses linked to this encounter.    Follow Up   No follow-ups on file.  Patient was given instructions and counseling regarding his condition or for health maintenance advice. Please see specific information pulled into the AVS if appropriate.   Discussed exam, checking labs, KUB, possible constipation. Discussed checking U/S LFT elevated in past.     Answers for HPI/ROS submitted by the patient on 2/26/2021   Abdominal pain  What is the primary reason for your visit?: Abdominal Pain        This document has been electronically signed by Gilles Canales MD on February 26, 2021

## 2021-03-02 ENCOUNTER — TELEPHONE (OUTPATIENT)
Dept: FAMILY MEDICINE CLINIC | Facility: CLINIC | Age: 51
End: 2021-03-02

## 2021-03-02 NOTE — TELEPHONE ENCOUNTER
----- Message from Gilles Canales MD sent at 2/28/2021  5:09 PM CST -----  Abd x-ray does show concern that constipation may be causing stomach pain. As discussed.

## 2021-03-15 ENCOUNTER — TELEPHONE (OUTPATIENT)
Dept: FAMILY MEDICINE CLINIC | Facility: CLINIC | Age: 51
End: 2021-03-15

## 2021-03-15 NOTE — TELEPHONE ENCOUNTER
Scheduled pt for appt at [facility] for ultrasound. Spoke with pt to give appt date/time. Reminded pt to call [facility] to cancel appt scheduled for same exam.

## 2021-03-18 ENCOUNTER — APPOINTMENT (OUTPATIENT)
Dept: ULTRASOUND IMAGING | Facility: HOSPITAL | Age: 51
End: 2021-03-18

## 2021-03-26 DIAGNOSIS — R10.11 RIGHT UPPER QUADRANT ABDOMINAL PAIN: ICD-10-CM

## 2021-04-08 ENCOUNTER — TELEPHONE (OUTPATIENT)
Dept: FAMILY MEDICINE CLINIC | Facility: CLINIC | Age: 51
End: 2021-04-08

## 2021-04-09 ENCOUNTER — OFFICE VISIT (OUTPATIENT)
Dept: FAMILY MEDICINE CLINIC | Facility: CLINIC | Age: 51
End: 2021-04-09

## 2021-04-09 VITALS
HEART RATE: 76 BPM | BODY MASS INDEX: 32.54 KG/M2 | DIASTOLIC BLOOD PRESSURE: 88 MMHG | TEMPERATURE: 97.5 F | HEIGHT: 70 IN | OXYGEN SATURATION: 98 % | SYSTOLIC BLOOD PRESSURE: 132 MMHG | WEIGHT: 227.3 LBS

## 2021-04-09 DIAGNOSIS — K59.04 CHRONIC IDIOPATHIC CONSTIPATION: ICD-10-CM

## 2021-04-09 DIAGNOSIS — R10.11 RIGHT UPPER QUADRANT ABDOMINAL PAIN: ICD-10-CM

## 2021-04-09 PROCEDURE — 99214 OFFICE O/P EST MOD 30 MIN: CPT | Performed by: FAMILY MEDICINE

## 2021-04-09 RX ORDER — DOCUSATE SODIUM 100 MG/1
100 CAPSULE, LIQUID FILLED ORAL 2 TIMES DAILY PRN
Qty: 60 CAPSULE | Refills: 1 | Status: SHIPPED | OUTPATIENT
Start: 2021-04-09 | End: 2021-06-12

## 2021-04-09 NOTE — PROGRESS NOTES
Answers for HPI/ROS submitted by the patient on 4/9/2021  What is the primary reason for your visit?: Other  Please describe your symptoms.: Follow up / ultrasound  Have you had these symptoms before?: Yes  How long have you been having these symptoms?: Greater than 2 weeks  Please list any medications you are currently taking for this condition.: None  Please describe any probable cause for these symptoms. : Unknown

## 2021-04-09 NOTE — PROGRESS NOTES
"Chief Complaint  Abdominal Pain (RUQ, follow up after ultrasound)    Subjective          Adolfo Barron is a 49 y.o. muscular AA male non-smoker with seasonal Allergies, H/O R sciatica, hemorrhoids see PMH/PSH. Pt presents for exam, to discuss health problems, Tx and F/U plan. presents to [facility]  Pain  This is a new (3 weeks) problem. The problem occurs daily. The problem has been waxing and waning. Exacerbated by: Worse in morning. Treatments tried: Laxative. The treatment provided moderate relief.   Constipation  This is a new problem. The current episode started more than 1 month ago. The problem has been gradually improving since onset. He has tried laxatives and stool softeners for the symptoms. The treatment provided moderate relief.       Objective   Vital Signs:   /88 (BP Location: Right arm, Patient Position: Sitting, Cuff Size: Adult)   Pulse 76   Temp 97.5 °F (36.4 °C) (Temporal)   Ht 177.8 cm (70\")   Wt 103 kg (227 lb 4.8 oz)   SpO2 98%   BMI 32.61 kg/m²     Physical Exam  Vitals and nursing note reviewed.   Constitutional:       General: He is not in acute distress.     Appearance: Normal appearance. He is well-developed. He is not ill-appearing, toxic-appearing or diaphoretic.   HENT:      Head: Normocephalic.      Right Ear: Tympanic membrane, ear canal and external ear normal. There is no impacted cerumen.      Left Ear: Tympanic membrane, ear canal and external ear normal. There is no impacted cerumen.      Nose: Nose normal. No congestion or rhinorrhea.   Eyes:      General: No scleral icterus.        Right eye: No discharge.         Left eye: No discharge.      Conjunctiva/sclera: Conjunctivae normal.      Pupils: Pupils are equal, round, and reactive to light.   Neck:      Thyroid: No thyromegaly.      Vascular: No JVD.      Trachea: No tracheal deviation.   Cardiovascular:      Rate and Rhythm: Normal rate and regular rhythm.    "   Heart sounds: Normal heart sounds. No murmur heard.   No friction rub. No gallop.    Pulmonary:      Effort: Pulmonary effort is normal. No respiratory distress.      Breath sounds: Normal breath sounds. No stridor. No wheezing, rhonchi or rales.   Chest:      Chest wall: No tenderness.   Abdominal:      General: Bowel sounds are normal. There is no distension.      Palpations: Abdomen is soft. There is no mass.      Tenderness: There is no abdominal tenderness. There is no right CVA tenderness, left CVA tenderness, guarding or rebound.      Hernia: No hernia is present.      Comments: No tenderness over liver, no CVA tenderness.    Musculoskeletal:         General: No tenderness or deformity. Normal range of motion.   Lymphadenopathy:      Cervical: No cervical adenopathy.   Skin:     General: Skin is warm and dry.      Coloration: Skin is not pale.      Findings: No erythema or rash.   Neurological:      Mental Status: He is alert and oriented to person, place, and time.      Cranial Nerves: No cranial nerve deficit.      Motor: No abnormal muscle tone.      Coordination: Coordination normal.      Deep Tendon Reflexes: Reflexes are normal and symmetric. Reflexes normal.   Psychiatric:         Mood and Affect: Mood normal.         Behavior: Behavior normal.         Thought Content: Thought content normal.         Judgment: Judgment normal.        Result Review :       Data reviewed: Radiologic studies U/S of RUQ          Assessment and Plan {CC Problem List  Visit Diagnosis  ROS  Review (Popup)  Health Maintenance  Quality  BestPractice  Medications  SmartSets  SnapShot Encounters  Media :23}   Diagnoses and all orders for this visit:    1. Right upper quadrant abdominal pain  -     docusate sodium (Colace) 100 MG capsule; Take 1 capsule by mouth 2 (Two) Times a Day As Needed for Constipation.  Dispense: 60 capsule; Refill: 1  -     polyethylene glycol (MIRALAX) 17 g packet; Take 17 g by mouth Daily  As Needed (Constipation).  Dispense: 8 each; Refill: 6    2. Chronic idiopathic constipation  -     docusate sodium (Colace) 100 MG capsule; Take 1 capsule by mouth 2 (Two) Times a Day As Needed for Constipation.  Dispense: 60 capsule; Refill: 1  -     polyethylene glycol (MIRALAX) 17 g packet; Take 17 g by mouth Daily As Needed (Constipation).  Dispense: 8 each; Refill: 6        Follow Up   Return in about 6 months (around 10/9/2021), or if symptoms worsen or fail to improve, for Recheck, Next scheduled follow up.  Patient was given instructions and counseling regarding his condition or for health maintenance advice. Please see specific information pulled into the AVS if appropriate.       Answers for HPI/ROS submitted by the patient on 4/9/2021  What is the primary reason for your visit?: Other  Please describe your symptoms.: Follow up / ultrasound  Have you had these symptoms before?: Yes  How long have you been having these symptoms?: Greater than 2 weeks  Please list any medications you are currently taking for this condition.: None  Please describe any probable cause for these symptoms. : Unknown          This document has been electronically signed by Gilles Canales MD

## 2021-04-10 PROBLEM — R10.11 RIGHT UPPER QUADRANT ABDOMINAL PAIN: Status: ACTIVE | Noted: 2021-04-10

## 2021-04-10 PROBLEM — K59.04 CHRONIC IDIOPATHIC CONSTIPATION: Status: ACTIVE | Noted: 2021-04-10

## 2021-04-10 RX ORDER — POLYETHYLENE GLYCOL 3350 17 G/17G
17 POWDER, FOR SOLUTION ORAL DAILY PRN
Qty: 8 EACH | Refills: 6 | COMMUNITY
Start: 2021-04-10 | End: 2022-01-20

## 2021-04-30 ENCOUNTER — TELEPHONE (OUTPATIENT)
Dept: FAMILY MEDICINE CLINIC | Facility: CLINIC | Age: 51
End: 2021-04-30

## 2021-04-30 RX ORDER — TADALAFIL 20 MG/1
20 TABLET ORAL DAILY PRN
Qty: 10 TABLET | Refills: 5 | Status: SHIPPED | OUTPATIENT
Start: 2021-04-30 | End: 2021-06-15 | Stop reason: SDUPTHER

## 2021-04-30 NOTE — TELEPHONE ENCOUNTER
Mr Barron called in stating that what Dr Canales suggested for the issue he was having didn't work and would like for Dr Canales to give him a call.

## 2021-05-13 ENCOUNTER — TELEPHONE (OUTPATIENT)
Dept: FAMILY MEDICINE CLINIC | Facility: CLINIC | Age: 51
End: 2021-05-13

## 2021-05-13 NOTE — TELEPHONE ENCOUNTER
Tried calling to let patient know that his appointment for May 14 has been moved to the same day clinic to see MICHI Tobin.  No answer no voicemail did send message in [employer]

## 2021-05-14 ENCOUNTER — LAB (OUTPATIENT)
Dept: LAB | Facility: HOSPITAL | Age: 51
End: 2021-05-14

## 2021-05-14 ENCOUNTER — OFFICE VISIT (OUTPATIENT)
Dept: FAMILY MEDICINE CLINIC | Facility: CLINIC | Age: 51
End: 2021-05-14

## 2021-05-14 VITALS
SYSTOLIC BLOOD PRESSURE: 130 MMHG | OXYGEN SATURATION: 97 % | DIASTOLIC BLOOD PRESSURE: 84 MMHG | HEIGHT: 70 IN | RESPIRATION RATE: 22 BRPM | WEIGHT: 217.6 LBS | TEMPERATURE: 98.5 F | HEART RATE: 82 BPM | BODY MASS INDEX: 31.15 KG/M2

## 2021-05-14 DIAGNOSIS — J02.9 SORE THROAT: ICD-10-CM

## 2021-05-14 DIAGNOSIS — R10.11 RIGHT UPPER QUADRANT ABDOMINAL PAIN: ICD-10-CM

## 2021-05-14 DIAGNOSIS — R63.4 WEIGHT LOSS: ICD-10-CM

## 2021-05-14 DIAGNOSIS — J02.9 SORE THROAT: Primary | ICD-10-CM

## 2021-05-14 DIAGNOSIS — R53.83 LETHARGY: ICD-10-CM

## 2021-05-14 LAB — ASO AB SERPL-ACNC: NEGATIVE [IU]/ML

## 2021-05-14 PROCEDURE — 86664 EPSTEIN-BARR NUCLEAR ANTIGEN: CPT

## 2021-05-14 PROCEDURE — 99214 OFFICE O/P EST MOD 30 MIN: CPT | Performed by: NURSE PRACTITIONER

## 2021-05-14 PROCEDURE — 86644 CMV ANTIBODY: CPT

## 2021-05-14 PROCEDURE — 86063 ANTISTREPTOLYSIN O SCREEN: CPT

## 2021-05-14 PROCEDURE — 86645 CMV ANTIBODY IGM: CPT

## 2021-05-14 PROCEDURE — 80053 COMPREHEN METABOLIC PANEL: CPT

## 2021-05-14 PROCEDURE — 86665 EPSTEIN-BARR CAPSID VCA: CPT

## 2021-05-14 PROCEDURE — 85025 COMPLETE CBC W/AUTO DIFF WBC: CPT

## 2021-05-14 RX ORDER — LIDOCAINE HYDROCHLORIDE 20 MG/ML
SOLUTION OROPHARYNGEAL
COMMUNITY
Start: 2021-04-30 | End: 2021-06-16

## 2021-05-14 NOTE — PROGRESS NOTES
"Answers for HPI/ROS submitted by the patient on 5/13/2021  What is the primary reason for your visit?: Other  Please describe your symptoms.: Uvula irritated and throat is scratchy. Went to Urgent Care and they did a throat culture for strep., Can you pull their records?  Have you had these symptoms before?: No  How long have you been having these symptoms?: Greater than 2 weeks  Please list any medications you are currently taking for this condition.: Lidocaine Viscous    Chief Complaint  Sore Throat    Subjective          Mata Nicolás Barron presents to [facility]    [facility]    PCP: Dr. Canales    CC: \"sore throat\"    C/O sore throat/irritation x 1 month.  Has also had decreased appetite, 10 pound weight loss and some lethargy.  Seen at a  when symptoms started and tested negative for strep and oral STD's.  Using viscous lidocaine, but symptoms persist.  Has occasional dry cough.     Reports RUQ pain not too long before above symptoms started.  Has normal RUQ US.  Treated for constipation with Miralax and bowels have been moving normal since that time.  States his appetite hasn't been the same and just doesn't feel like himself.  Denies n/v or heartburn symptoms.  Has not had colonoscopy.  Denies family hx of colorectal cancer.     Sore Throat   Chronicity: persistetn. The current episode started 1 to 4 weeks ago (close to a month ago). The problem has been unchanged. There has been no fever. The patient is experiencing no pain (mild irritation). Associated symptoms include abdominal pain (had some RUQ pain prior to sore throat staring, but none since), coughing (only occasional dry cough) and neck pain (anterior, irritation). Pertinent negatives include no congestion, diarrhea, drooling, ear discharge, ear pain, headaches, hoarse voice, plugged ear sensation, shortness of breath, stridor, swollen glands, trouble swallowing or vomiting. He has had no exposure to " "strep or mono. Treatments tried: lidocaine. The treatment provided no relief.       Review of Systems   Constitutional: Positive for appetite change, fatigue ( more lethargy than normal) and unexpected weight change (down 10 pounds in the last month). Negative for fever.   HENT: Positive for sore throat. Negative for congestion, drooling, ear discharge, ear pain, hoarse voice, mouth sores, nosebleeds, postnasal drip, rhinorrhea, sinus pressure, sinus pain, sneezing, trouble swallowing and voice change.    Eyes: Negative.    Respiratory: Positive for cough (only occasional dry cough). Negative for chest tightness, shortness of breath, wheezing and stridor.    Cardiovascular: Negative.    Gastrointestinal: Positive for abdominal pain (had some RUQ pain prior to sore throat staring, but none since) and constipation (prior to sore throat, has resolved, normal BM's). Negative for blood in stool, diarrhea, nausea and vomiting.   Genitourinary: Negative.    Musculoskeletal: Positive for neck pain (anterior, irritation).   Skin: Negative.    Neurological: Negative for dizziness and headaches.        Objective   Vital Signs:   /84 (BP Location: Left arm, Patient Position: Sitting, Cuff Size: Large Adult)   Pulse 82   Temp 98.5 °F (36.9 °C)   Resp 22   Ht 177.8 cm (70\")   Wt 98.7 kg (217 lb 9.6 oz)   SpO2 97%   BMI 31.22 kg/m²       Physical Exam  Vitals and nursing note reviewed.   Constitutional:       General: He is not in acute distress.     Appearance: Normal appearance. He is obese. He is not ill-appearing.   HENT:      Head: Normocephalic and atraumatic.      Right Ear: Tympanic membrane and ear canal normal.      Left Ear: Tympanic membrane and ear canal normal.      Nose: Nose normal. No congestion or rhinorrhea.      Mouth/Throat:      Mouth: Mucous membranes are moist.      Pharynx: Oropharynx is clear. No oropharyngeal exudate or posterior oropharyngeal erythema.   Eyes:      General:         Right " eye: No discharge.         Left eye: No discharge.      Extraocular Movements: Extraocular movements intact.      Conjunctiva/sclera: Conjunctivae normal.   Cardiovascular:      Rate and Rhythm: Normal rate and regular rhythm.   Pulmonary:      Effort: Pulmonary effort is normal. No respiratory distress.      Breath sounds: Normal breath sounds. No wheezing, rhonchi or rales.   Abdominal:      General: Bowel sounds are normal. There is no distension.      Palpations: Abdomen is soft.      Tenderness: There is no abdominal tenderness. There is no right CVA tenderness, left CVA tenderness, guarding or rebound.   Musculoskeletal:      Cervical back: Normal range of motion and neck supple. Tenderness ( mild around thyroid area) present. No rigidity.   Lymphadenopathy:      Cervical: No cervical adenopathy.   Skin:     General: Skin is warm and dry.      Findings: No rash.   Neurological:      General: No focal deficit present.      Mental Status: He is alert and oriented to person, place, and time.   Psychiatric:         Thought Content: Thought content normal.          Result Review :     Common labs    Common Labsle 9/11/20 9/11/20 9/11/20    0955 0955 0955   Glucose  79    BUN  12    Creatinine  1.13    eGFR African Am  83    Sodium  139    Potassium  4.2    Chloride  107    Calcium  8.8    Albumin  4.10    Total Bilirubin  0.4    Alkaline Phosphatase  60    AST (SGOT)  21    ALT (SGPT)  26    WBC 3.17 (A)     Hemoglobin 16.1     Hematocrit 47.3     Platelets 252     Total Cholesterol   206 (A)   Triglycerides   85   HDL Cholesterol   54   LDL Cholesterol    135 (A)   (A) Abnormal value                      Assessment and Plan    Diagnoses and all orders for this visit:    1. Sore throat (Primary)  -     Antistreptolysin O (ASO) Screen; Future  -     EBV Antibody Profile; Future  -     Cytomegalovirus Antibody, IgG; Future  -     Cytomegalovirus Antibody, IgM; Future    2. Lethargy    3. Weight loss      Labs as  above--already has CBC, CMP ordered by PCP--will notify with results when available  Declines HIV testing at this time, denies concern for this    Rest.   Fluids.     If labs normal, will consider US neck if discomfort persist    Patient's Body mass index is 31.22 kg/m². indicating that he is obese (BMI >30). Obesity-related health conditions include the following: none. Obesity is improving with lifestyle modifications. BMI is is above average; BMI management plan is completed. We discussed portion control and increasing exercise..    See PCP or RTC if symptoms persist/worsen  See PCP for routine f/u visit and management of chronic medical conditions      This document has been electronically signed by LIBORIO Gooden on May 14, 2021 11:07 CDT,.

## 2021-05-15 LAB
ALBUMIN SERPL-MCNC: 4.1 G/DL (ref 3.5–5.2)
ALBUMIN/GLOB SERPL: 1.4 G/DL
ALP SERPL-CCNC: 60 U/L (ref 39–117)
ALT SERPL W P-5'-P-CCNC: 26 U/L (ref 1–41)
ANION GAP SERPL CALCULATED.3IONS-SCNC: 10.8 MMOL/L (ref 5–15)
AST SERPL-CCNC: 23 U/L (ref 1–40)
BASOPHILS # BLD AUTO: 0.03 10*3/MM3 (ref 0–0.2)
BASOPHILS NFR BLD AUTO: 0.8 % (ref 0–1.5)
BILIRUB SERPL-MCNC: 0.4 MG/DL (ref 0–1.2)
BUN SERPL-MCNC: 16 MG/DL (ref 6–20)
BUN/CREAT SERPL: 16.8 (ref 7–25)
CALCIUM SPEC-SCNC: 9.2 MG/DL (ref 8.6–10.5)
CHLORIDE SERPL-SCNC: 107 MMOL/L (ref 98–107)
CO2 SERPL-SCNC: 24.2 MMOL/L (ref 22–29)
CREAT SERPL-MCNC: 0.95 MG/DL (ref 0.76–1.27)
DEPRECATED RDW RBC AUTO: 43.9 FL (ref 37–54)
EOSINOPHIL # BLD AUTO: 0.08 10*3/MM3 (ref 0–0.4)
EOSINOPHIL NFR BLD AUTO: 2.1 % (ref 0.3–6.2)
ERYTHROCYTE [DISTWIDTH] IN BLOOD BY AUTOMATED COUNT: 13.5 % (ref 12.3–15.4)
GFR SERPL CREATININE-BSD FRML MDRD: 101 ML/MIN/1.73
GLOBULIN UR ELPH-MCNC: 2.9 GM/DL
GLUCOSE SERPL-MCNC: 80 MG/DL (ref 65–99)
HCT VFR BLD AUTO: 47.9 % (ref 37.5–51)
HGB BLD-MCNC: 15.9 G/DL (ref 13–17.7)
IMM GRANULOCYTES # BLD AUTO: 0.01 10*3/MM3 (ref 0–0.05)
IMM GRANULOCYTES NFR BLD AUTO: 0.3 % (ref 0–0.5)
LYMPHOCYTES # BLD AUTO: 1.12 10*3/MM3 (ref 0.7–3.1)
LYMPHOCYTES NFR BLD AUTO: 29 % (ref 19.6–45.3)
MCH RBC QN AUTO: 29.8 PG (ref 26.6–33)
MCHC RBC AUTO-ENTMCNC: 33.2 G/DL (ref 31.5–35.7)
MCV RBC AUTO: 89.7 FL (ref 79–97)
MONOCYTES # BLD AUTO: 0.49 10*3/MM3 (ref 0.1–0.9)
MONOCYTES NFR BLD AUTO: 12.7 % (ref 5–12)
NEUTROPHILS NFR BLD AUTO: 2.13 10*3/MM3 (ref 1.7–7)
NEUTROPHILS NFR BLD AUTO: 55.1 % (ref 42.7–76)
NRBC BLD AUTO-RTO: 0 /100 WBC (ref 0–0.2)
PLATELET # BLD AUTO: 283 10*3/MM3 (ref 140–450)
PMV BLD AUTO: 10.5 FL (ref 6–12)
POTASSIUM SERPL-SCNC: 4.2 MMOL/L (ref 3.5–5.2)
PROT SERPL-MCNC: 7 G/DL (ref 6–8.5)
RBC # BLD AUTO: 5.34 10*6/MM3 (ref 4.14–5.8)
SODIUM SERPL-SCNC: 142 MMOL/L (ref 136–145)
WBC # BLD AUTO: 3.86 10*3/MM3 (ref 3.4–10.8)

## 2021-05-16 LAB
CMV IGG SERPL IA-ACNC: <0.6 U/ML (ref 0–0.59)
CMV IGM SERPL IA-ACNC: <30 AU/ML (ref 0–29.9)

## 2021-05-17 ENCOUNTER — TELEPHONE (OUTPATIENT)
Dept: FAMILY MEDICINE CLINIC | Facility: CLINIC | Age: 51
End: 2021-05-17

## 2021-05-17 LAB
EBV NA IGG SER IA-ACNC: 322 U/ML (ref 0–17.9)
EBV VCA IGG SER IA-ACNC: 271 U/ML (ref 0–17.9)
EBV VCA IGM SER IA-ACNC: <36 U/ML (ref 0–35.9)
SERVICE CMNT-IMP: ABNORMAL

## 2021-05-17 NOTE — TELEPHONE ENCOUNTER
----- Message from Gilles Canales MD sent at 5/16/2021  7:32 PM CDT -----  CBC and CMP OK, will go over labs at next visit.

## 2021-05-18 ENCOUNTER — TELEPHONE (OUTPATIENT)
Dept: FAMILY MEDICINE CLINIC | Facility: CLINIC | Age: 51
End: 2021-05-18

## 2021-05-19 ENCOUNTER — TELEPHONE (OUTPATIENT)
Dept: FAMILY MEDICINE CLINIC | Facility: CLINIC | Age: 51
End: 2021-05-19

## 2021-05-20 ENCOUNTER — TELEPHONE (OUTPATIENT)
Dept: FAMILY MEDICINE CLINIC | Facility: CLINIC | Age: 51
End: 2021-05-20

## 2021-05-20 RX ORDER — LORATADINE 10 MG/1
10 TABLET ORAL DAILY
Qty: 30 TABLET | Refills: 2 | Status: SHIPPED | OUTPATIENT
Start: 2021-05-20 | End: 2021-06-16

## 2021-05-20 RX ORDER — OMEPRAZOLE 40 MG/1
40 CAPSULE, DELAYED RELEASE ORAL DAILY
Qty: 30 CAPSULE | Refills: 2 | Status: SHIPPED | OUTPATIENT
Start: 2021-05-20 | End: 2021-06-16

## 2021-06-01 ENCOUNTER — TELEPHONE (OUTPATIENT)
Dept: FAMILY MEDICINE CLINIC | Facility: CLINIC | Age: 51
End: 2021-06-01

## 2021-06-01 NOTE — TELEPHONE ENCOUNTER
Returned call to pt to let him know would discuss any labs that may need to be done during his visit. No answer. No voice mail.

## 2021-06-01 NOTE — TELEPHONE ENCOUNTER
Patient called he said he spoke with Doctor about a matter. He has a appointment next week but was wondering if he could possibly get some blood work before he comes into that appointment.

## 2021-06-09 ENCOUNTER — LAB (OUTPATIENT)
Dept: LAB | Facility: HOSPITAL | Age: 51
End: 2021-06-09

## 2021-06-09 ENCOUNTER — OFFICE VISIT (OUTPATIENT)
Dept: FAMILY MEDICINE CLINIC | Facility: CLINIC | Age: 51
End: 2021-06-09

## 2021-06-09 VITALS
OXYGEN SATURATION: 99 % | DIASTOLIC BLOOD PRESSURE: 80 MMHG | HEIGHT: 70 IN | WEIGHT: 225 LBS | TEMPERATURE: 97.1 F | RESPIRATION RATE: 20 BRPM | SYSTOLIC BLOOD PRESSURE: 126 MMHG | HEART RATE: 74 BPM | BODY MASS INDEX: 32.21 KG/M2

## 2021-06-09 DIAGNOSIS — R53.83 FATIGUE, UNSPECIFIED TYPE: ICD-10-CM

## 2021-06-09 DIAGNOSIS — Z11.59 ENCOUNTER FOR HEPATITIS C SCREENING TEST FOR LOW RISK PATIENT: ICD-10-CM

## 2021-06-09 DIAGNOSIS — R61 NIGHT SWEATS: ICD-10-CM

## 2021-06-09 DIAGNOSIS — J30.1 SEASONAL ALLERGIC RHINITIS DUE TO POLLEN: ICD-10-CM

## 2021-06-09 DIAGNOSIS — R05.9 COUGH: ICD-10-CM

## 2021-06-09 DIAGNOSIS — J39.2 THROAT IRRITATION: ICD-10-CM

## 2021-06-09 DIAGNOSIS — R06.83 SNORING: ICD-10-CM

## 2021-06-09 LAB
HCV AB SER DONR QL: NORMAL
HIV1+2 AB SER QL: NORMAL

## 2021-06-09 PROCEDURE — 86803 HEPATITIS C AB TEST: CPT

## 2021-06-09 PROCEDURE — 86003 ALLG SPEC IGE CRUDE XTRC EA: CPT

## 2021-06-09 PROCEDURE — G0432 EIA HIV-1/HIV-2 SCREEN: HCPCS

## 2021-06-09 PROCEDURE — 99214 OFFICE O/P EST MOD 30 MIN: CPT | Performed by: FAMILY MEDICINE

## 2021-06-09 NOTE — PROGRESS NOTES
Chief Complaint  Follow-up, Fatigue, and Cough    Subjective          Review of Systems   Constitutional: Positive for fatigue. Negative for activity change, appetite change and unexpected weight change.   HENT: Positive for postnasal drip and sore throat. Negative for dental problem, drooling, ear discharge, ear pain, facial swelling, hearing loss, mouth sores, nosebleeds, rhinorrhea, sinus pressure, sneezing, tinnitus, trouble swallowing and voice change.    Eyes: Negative for pain and visual disturbance.        Eye exam Vision works   Respiratory: Positive for cough. Negative for apnea, choking, chest tightness, wheezing and stridor.         Snores wakes up sweating     Cardiovascular: Negative for palpitations and leg swelling.   Gastrointestinal: Positive for rectal pain. Negative for abdominal distention, anal bleeding and blood in stool.        Hemorhoids       Endocrine: Negative for cold intolerance, heat intolerance, polydipsia, polyphagia and polyuria.   Genitourinary: Negative for decreased urine volume, difficulty urinating, enuresis, genital sores, hematuria and penile swelling.        Pain from back shoots into R testicle has resolved   Musculoskeletal: Positive for back pain. Negative for gait problem, neck pain and neck stiffness.        No back pain today  L shoulder and neck pain better.   Skin: Negative for color change, pallor and wound.   Allergic/Immunologic: Negative for environmental allergies, food allergies and immunocompromised state.   Neurological: Negative for dizziness, tremors, seizures, syncope, facial asymmetry, speech difficulty and light-headedness.        Daytime sleepiness.   Hematological: Negative for adenopathy. Does not bruise/bleed easily.   Psychiatric/Behavioral: Negative for agitation, behavioral problems, confusion, decreased concentration, dysphoric mood, hallucinations, self-injury, sleep disturbance and suicidal ideas. The patient is not nervous/anxious and is not  "hyperactive.        Adolfo Barron presents to [facility]  Fatigue  This is a recurrent problem. The current episode started more than 1 month ago. The problem occurs daily. The problem has been waxing and waning. Associated symptoms include coughing, fatigue and a sore throat. Pertinent negatives include no neck pain. Associated symptoms comments: Daytime sleepiness. Exacerbated by: Working night shift. Snores,  He has tried rest (Antibiotics, Antihistamines. ) for the symptoms. The treatment provided mild relief.   Cough  This is a new problem. The current episode started 1 to 4 weeks ago. The problem has been waxing and waning. The problem occurs hourly. Cough characteristics: irritated throat. Associated symptoms include postnasal drip and a sore throat. Pertinent negatives include no ear pain, rhinorrhea or wheezing. He has tried OTC cough suppressant and prescription cough suppressant (Antibiotic, Steroids) for the symptoms. The treatment provided mild relief. There is no history of environmental allergies.       Objective   Vital Signs:   /80 (BP Location: Right arm, Patient Position: Sitting, Cuff Size: Large Adult)   Pulse 74   Temp 97.1 °F (36.2 °C) (Temporal)   Resp 20   Ht 177.8 cm (70\")   Wt 102 kg (225 lb)   SpO2 99%   BMI 32.28 kg/m²     Physical Exam  Vitals and nursing note reviewed.   Constitutional:       General: He is not in acute distress.     Appearance: Normal appearance. He is well-developed. He is not ill-appearing, toxic-appearing or diaphoretic.   HENT:      Head: Normocephalic.      Right Ear: Tympanic membrane, ear canal and external ear normal. There is no impacted cerumen.      Left Ear: Tympanic membrane, ear canal and external ear normal. There is no impacted cerumen.      Nose: Nose normal. No congestion or rhinorrhea.   Eyes:      General: No scleral icterus.        Right eye: No discharge.         Left eye: No discharge.      " Conjunctiva/sclera: Conjunctivae normal.      Pupils: Pupils are equal, round, and reactive to light.   Neck:      Thyroid: No thyromegaly.      Vascular: No JVD.      Trachea: No tracheal deviation.   Cardiovascular:      Rate and Rhythm: Normal rate and regular rhythm.      Heart sounds: Normal heart sounds. No murmur heard.   No friction rub. No gallop.    Pulmonary:      Effort: Pulmonary effort is normal. No respiratory distress.      Breath sounds: Normal breath sounds. No stridor. No wheezing, rhonchi or rales.   Chest:      Chest wall: No tenderness.   Abdominal:      General: Bowel sounds are normal. There is no distension.      Palpations: Abdomen is soft. There is no mass.      Tenderness: There is no abdominal tenderness. There is no right CVA tenderness, left CVA tenderness, guarding or rebound.      Hernia: No hernia is present.      Comments: No tenderness over liver, no CVA tenderness.    Musculoskeletal:         General: No tenderness or deformity. Normal range of motion.   Lymphadenopathy:      Cervical: No cervical adenopathy.   Skin:     General: Skin is warm and dry.      Coloration: Skin is not pale.      Findings: No erythema or rash.   Neurological:      Mental Status: He is alert and oriented to person, place, and time.      Cranial Nerves: No cranial nerve deficit.      Motor: No abnormal muscle tone.      Coordination: Coordination normal.      Deep Tendon Reflexes: Reflexes are normal and symmetric. Reflexes normal.   Psychiatric:         Mood and Affect: Mood normal.         Behavior: Behavior normal.         Thought Content: Thought content normal.         Judgment: Judgment normal.        Result Review :                 Assessment and Plan    Diagnoses and all orders for this visit:    1. Cough  -     Allergens, Zone 5; Future    2. Fatigue, unspecified type  -     Allergens, Zone 5; Future  -     Ambulatory Referral to Sleep Medicine    3. Snoring  -     Ambulatory Referral to Sleep  Medicine    4. Night sweats  -     HIV-1 & HIV-2 Antibodies; Future    5. Encounter for hepatitis C screening test for low risk patient  -     Hepatitis C antibody; Future    6. Seasonal allergic rhinitis due to pollen    7. Throat irritation  Comments:  Possibly related to snoring.        Follow Up   Return in about 6 weeks (around 7/21/2021), or if symptoms worsen or fail to improve, for Recheck.  Patient was given instructions and counseling regarding his condition or for health maintenance advice. Please see specific information pulled into the AVS if appropriate.           This document has been electronically signed by Gilles Canales MD

## 2021-06-11 ENCOUNTER — TELEPHONE (OUTPATIENT)
Dept: FAMILY MEDICINE CLINIC | Facility: CLINIC | Age: 51
End: 2021-06-11

## 2021-06-11 NOTE — TELEPHONE ENCOUNTER
----- Message from Gilles Canales MD sent at 6/10/2021  8:12 AM CDT -----  Labs screenings were negative

## 2021-06-14 ENCOUNTER — TELEPHONE (OUTPATIENT)
Dept: FAMILY MEDICINE CLINIC | Facility: CLINIC | Age: 51
End: 2021-06-14

## 2021-06-14 LAB
A ALTERNATA IGE QN: <0.1 KU/L
A FUMIGATUS IGE QN: <0.1 KU/L
AMER ROACH IGE QN: 0.13 KU/L
BAHIA GRASS IGE QN: 1.25 KU/L
BAYBERRY POLN IGE QN: <0.1 KU/L
BERMUDA GRASS IGE QN: <0.1 KU/L
BOXELDER IGE QN: <0.1 KU/L
C HERBARUM IGE QN: <0.1 KU/L
CAT DANDER IGE QN: <0.1 KU/L
COMMON RAGWEED IGE QN: 0.43 KU/L
CONV CLASS DESCRIPTION: ABNORMAL
D FARINAE IGE QN: 0.33 KU/L
D PTERONYSS IGE QN: 0.41 KU/L
DOG DANDER IGE QN: <0.1 KU/L
DOG FENNEL IGE QN: <0.1 KU/L
ENGL PLANTAIN IGE QN: 0.1 KU/L
GOOSEFOOT IGE QN: 0.11 KU/L
GUM-TREE IGE QN: <0.1 KU/L
ITALIAN CYPRESS IGE QN: <0.1 KU/L
JOHNSON GRASS IGE QN: 0.65 KU/L
M RACEMOSUS IGE QN: <0.1 KU/L
P NOTATUM IGE QN: <0.1 KU/L
PEPPER TREE IGE QN: <0.1 KU/L
PER RYE GRASS IGE QN: 2.09 KU/L
PRIVET IGE QN: <0.1 KU/L
QUEEN PALM IGE QN: <0.1 KU/L
S BOTRYOSUM IGE QN: <0.1 KU/L
SHEEP SORREL IGE QN: <0.1 KU/L
VIRG LIVE OAK IGE QN: <0.1 KU/L
WHITE ELM IGE QN: <0.1 KU/L

## 2021-06-14 NOTE — TELEPHONE ENCOUNTER
----- Message from Gilles Canales MD sent at 6/14/2021 12:45 PM CDT -----  Has some Allergies that may be contributing to symptoms.

## 2021-06-15 DIAGNOSIS — N52.9 ERECTILE DYSFUNCTION, UNSPECIFIED ERECTILE DYSFUNCTION TYPE: Primary | ICD-10-CM

## 2021-06-15 RX ORDER — TADALAFIL 20 MG/1
20 TABLET ORAL DAILY PRN
Qty: 10 TABLET | Refills: 1 | Status: SHIPPED | OUTPATIENT
Start: 2021-06-15 | End: 2021-07-24 | Stop reason: SDUPTHER

## 2021-06-16 ENCOUNTER — OFFICE VISIT (OUTPATIENT)
Dept: SLEEP MEDICINE | Facility: HOSPITAL | Age: 51
End: 2021-06-16

## 2021-06-16 VITALS
SYSTOLIC BLOOD PRESSURE: 130 MMHG | HEIGHT: 70 IN | WEIGHT: 219 LBS | OXYGEN SATURATION: 98 % | HEART RATE: 63 BPM | DIASTOLIC BLOOD PRESSURE: 93 MMHG | BODY MASS INDEX: 31.35 KG/M2

## 2021-06-16 DIAGNOSIS — R06.83 SNORING: Primary | ICD-10-CM

## 2021-06-16 DIAGNOSIS — G47.19 EXCESSIVE DAYTIME SLEEPINESS: ICD-10-CM

## 2021-06-16 PROCEDURE — 99214 OFFICE O/P EST MOD 30 MIN: CPT | Performed by: NURSE PRACTITIONER

## 2021-06-16 RX ORDER — CETIRIZINE HYDROCHLORIDE 10 MG/1
10 TABLET ORAL DAILY
COMMUNITY
End: 2022-01-20

## 2021-06-16 NOTE — PROGRESS NOTES
New Patient Sleep Medicine Consultation    Encounter Date: 6/16/2021         Patient's PCP: Gilles Canales MD  Referring provider: Gilles Canales MD  Reason for consultation chief complaint: snoring, loud disruptive snoring, excessive daytime sleepiness and unrefreshing sleep    Adolfo Barron is a 50 y.o. male whose bedtime is ~ 0530. He  falls asleep after 0-15 minutes, and is up 1-3 times per day. He wakes up ~ 0750. He is up until about 1130 then goes back to sleep until about 1430. He gets up to do errands or things around the house.  He endorses 4-5 hours of sleep. He drinks 0 cups of coffee, 0 teas, and 0 sodas per day. He drinks 0 alcoholic beverages per week.  He works night shift ~ the past 5 years. He works 5-6 nights per week Monday-Saturday. He keeps bedroom cool and dark.     Adolfo Barron admits to snoring, unrestful sleep, decreased libido, working night shift or rotataing shifts, excessive daytime sleepiness, morning headaches, sleeping less than 6 hours per night, Disturbed or restless sleep, sleepy driving, night sweats, abnormal or violent dreams, restless legs at night and sleepwalking or sleeptalking for >5 years.He has reoccuring dream where dog is chasing him.He denies cataplexy, sleep paralysis, or hypnagogic hallucinations. He takes no medicine  for sleep. He has occasional sleepiness with driving. Denies close calls or accidents related to falling asleep at the wheel .He sleeps in a bed mostly alone.     He is a never smoker.       Marital status:    Occupation: production and manufacturing   Children: 8  Other family history + for: unknown   FH of sleep disorders: potentially sister       Past Medical History:   Diagnosis Date   • Acute bronchitis    • Acute otitis media    • Acute sinusitis    • Allergic rhinitis    • Biceps femoris tendinitis    • Candidiasis of skin and nails    • Chronic allergic conjunctivitis    • Cough    • Upper respiratory infection   "    Social History     Socioeconomic History   • Marital status:      Spouse name: Not on file   • Number of children: Not on file   • Years of education: Not on file   • Highest education level: Not on file   Tobacco Use   • Smoking status: Never Smoker   • Smokeless tobacco: Never Used   Substance and Sexual Activity   • Alcohol use: No   • Drug use: Defer   • Sexual activity: Defer     No family history on file.      Review of Systems:  Constitutional: negative  Eyes: negative  Ears, nose, mouth, throat, and face: negative  Respiratory: negative  Cardiovascular: negative  Gastrointestinal: negative  Genitourinary:negative  Integument/breast: negative  Hematologic/lymphatic: negative  Musculoskeletal:negative  Neurological: negative  Behavioral/Psych: negative  Endocrine: negative  Allergic/Immunologic: negative Patient advised to discuss any positive ROS with PCP.      [place] - 14      Vitals:    06/16/21 0910   BP: 130/93   Pulse: 63   SpO2: 98%           06/16/21  0910   Weight: 99.3 kg (219 lb)       Body mass index is 31.42 kg/m². Patient's Body mass index is 31.42 kg/m². indicating that he is obese (BMI >30).  BMI is is above average; BMI management plan is completed. We discussed portion control and increasing exercise..      Neck circumference: 18\"          General: Alert. Cooperative. Well developed. No acute distress.             Head:  Normocephalic. Symmetrical. Atraumatic.              Eyes: Sclera clear. No icterus. PERRLA. Normal EOM.             Ears: No deformities. Normal hearing.             Nose: No septal deviation. No drainage.          Throat: No oral lesions. No thrush. Moist mucous membranes. Trachea midline    Tongue is normal    Dentition is fair       Pharynx: Posterior pharyngeal pillars are narrow    Mallampati score of II (hard and soft palate, upper portion of tonsils anduvula visible)    Pharynx is nonerythematous   Chest Wall:  Normal shape. Symmetric expansion with " respiration. No tenderness.          Lungs:  Clear to auscultation bilaterally. No wheezes. No rhonchi. No rales. Respirations regular, even and unlabored.            Heart:  Regular rhythm and normal rate. Normal S1 and S2. No murmur.  Extremities:  Moves all extremities well. No edema.           Pulses: Pulses palpable and equal bilaterally.               Skin: Dry. Intact. No bleeding. No rash.           Neuro: Moves all 4 extremities and cranial nerves grossly intact.  Psychiatric: Normal mood and affect.      Current Outpatient Medications:   •  Lidocaine Viscous HCl (XYLOCAINE) 2 % solution, USE 10 ML BY MOUTH EVERY 6 HOURS FOR 5 DAYS, Disp: , Rfl:   •  loratadine (Claritin) 10 MG tablet, Take 1 tablet by mouth Daily., Disp: 30 tablet, Rfl: 2  •  omeprazole (priLOSEC) 40 MG capsule, Take 1 capsule by mouth Daily., Disp: 30 capsule, Rfl: 2  •  polyethylene glycol (MIRALAX) 17 g packet, Take 17 g by mouth Daily As Needed (Constipation)., Disp: 8 each, Rfl: 6  •  tadalafil (Cialis) 20 MG tablet, Take 1 tablet by mouth Daily As Needed for Erectile Dysfunction., Disp: 10 tablet, Rfl: 1    Lab Results   Component Value Date    WBC 3.86 05/14/2021    HGB 15.9 05/14/2021    HCT 47.9 05/14/2021    MCV 89.7 05/14/2021     05/14/2021     Lab Results   Component Value Date    GLUCOSE 80 05/14/2021    CALCIUM 9.2 05/14/2021     05/14/2021    K 4.2 05/14/2021    CO2 24.2 05/14/2021     05/14/2021    BUN 16 05/14/2021    CREATININE 0.95 05/14/2021    EGFRIFAFRI 101 05/14/2021    BCR 16.8 05/14/2021    ANIONGAP 10.8 05/14/2021     No results found for: INR, PROTIME  No results found for: CKTOTAL, CKMB, CKMBINDEX, TROPONINI, TROPONINT    No results found for: PHART, IYU5BUG, PO2ART]    Contraindications to home sleep test: none    Assessment and Plan:    1. Excessive daytime sleepiness- New (to me), additional work-up planned (4)  1. Check HST  2. Sleepy driving tips   3. RTC in 2 weeks with study results    2.   Snoring- New to me, additional work-up planned    1. As above         3.  Night shift worker         4.  Short sleep duration     I obtained a brief history from the patient, reviewed the medical problems and current medications, and made medical decisions regarding treatment based on that information.   I spent 30 minutes caring for Mata on this date of service. This time includes time spent by me in the following activities: preparing for the visit, obtaining and/or reviewing a separately obtained history, performing a medically appropriate examination and/or evaluation, counseling and educating the patient/family/caregiver, ordering medications, tests, or procedures and documenting information in the medical record. I answered all of the patient's questions and he verbalized understanding. Discussion involved good sleep hygiene, sleep apnea, effects of untreated sleep apnea/chronic sleep loss.           RTC 2 weeks after study for results.             This document has been electronically signed by LIBORIO Montiel on June 16, 2021 09:11 CDT          This document has been electronically signed by LIBORIO Montiel on June 16, 2021         CC: Gilles Canales MD Fralish, Billy Kevin, MD

## 2021-06-22 ENCOUNTER — HOSPITAL ENCOUNTER (OUTPATIENT)
Dept: SLEEP MEDICINE | Facility: HOSPITAL | Age: 51
Discharge: HOME | End: 2021-06-22
Admitting: NURSE PRACTITIONER

## 2021-06-22 DIAGNOSIS — R06.83 SNORING: ICD-10-CM

## 2021-06-22 DIAGNOSIS — G47.19 EXCESSIVE DAYTIME SLEEPINESS: ICD-10-CM

## 2021-06-22 PROCEDURE — 95806 SLEEP STUDY UNATT&RESP EFFT: CPT | Performed by: INTERNAL MEDICINE

## 2021-06-22 PROCEDURE — 95806 SLEEP STUDY UNATT&RESP EFFT: CPT

## 2021-07-06 ENCOUNTER — OFFICE VISIT (OUTPATIENT)
Dept: SLEEP MEDICINE | Facility: HOSPITAL | Age: 51
End: 2021-07-06

## 2021-07-06 VITALS
DIASTOLIC BLOOD PRESSURE: 86 MMHG | HEIGHT: 70 IN | HEART RATE: 76 BPM | OXYGEN SATURATION: 98 % | BODY MASS INDEX: 31.35 KG/M2 | WEIGHT: 219 LBS | SYSTOLIC BLOOD PRESSURE: 124 MMHG

## 2021-07-06 DIAGNOSIS — G47.39 POSITIONAL SLEEP APNEA: Primary | ICD-10-CM

## 2021-07-06 PROCEDURE — 99212 OFFICE O/P EST SF 10 MIN: CPT | Performed by: NURSE PRACTITIONER

## 2021-07-06 NOTE — PROGRESS NOTES
Sleep Clinic Follow-Up    CHIEF COMPLAINT: follow-up sleep testing    LAST OV: 06/16/2021    HPI:  The patient is a 50 y.o. male.  I discussed the results of the recent home sleep study performed on 06/22/2021. Total presumed sleep time was 6 hrs 7 minutes. The AHI/ELIZABETH was 3.4, supine AHI was 8.7.  Average oxygen saturation was 94% with a pete of 86%. Snoring was present. Mean heart rate was 66 BPM.       INTERVAL MEDICAL HISTORY: No change since last office visit in regard to the patient's bedtime routine, medications, or diagnosis.    Sleepiness with Driving: denies       MEDICATIONS:   Current Outpatient Medications:   •  cetirizine (zyrTEC) 10 MG tablet, Take 10 mg by mouth Daily., Disp: , Rfl:   •  polyethylene glycol (MIRALAX) 17 g packet, Take 17 g by mouth Daily As Needed (Constipation)., Disp: 8 each, Rfl: 6  •  tadalafil (Cialis) 20 MG tablet, Take 1 tablet by mouth Daily As Needed for Erectile Dysfunction., Disp: 10 tablet, Rfl: 1    PHYSICAL EXAM  Vitals:    07/06/21 1023   BP: 124/86   Pulse: 76   SpO2: 98%           07/06/21  1023   Weight: 99.3 kg (219 lb)       Body mass index is 31.42 kg/m². Patient's Body mass index is 31.42 kg/m². indicating that he is obese (BMI >30). Obesity-related health conditions include the following: none. Obesity is unchanged. BMI is is above average; BMI management plan is completed. We discussed portion control and increasing exercise..      Gen:  No acute distress heard in voice, alert, oriented  Eyes:    Moist conjunctiva, EOMI   Lungs:  Normal effort, non-labored breathing, clear to auscultation bilaterally   Heart:  Normal S1/S2, no murmur , no lower extremity edema   Psych:  Appropriate affect   Neuro:  Cn2-12 appear intact     Assessment and Plan:    1. Positional sleep apnea - Established, stable (1)  1. Recommendations include wedge pillow or tennis ball t-shirt to avoid supine position   2. If symptoms persist/worsens will order in lab PSG   3. Good sleep  hygiene   4. Sleepy driving tips   5. Return to clinic in 1 year or sooner if needed   2. Night shift worker    The sleep results were discussed in detail with the patient.  The risks of untreated sleep apnea were reviewed.  Treatment options for positional sleep apnea were discussed in detail. He wants to try wedge pillow before coming to the lab for sleep study.  I counseled patient on sleep hygiene, including regular sleep wake schedule and stimulus control therapy, the importance of weight reduction, safe driving and abstaining from smoking and alcohol consumption, as well as other sedatives.     All of the patient's questions were answered. He states understanding and agreement with my assessment and plan as above.     I obtained a brief history from the patient, reviewed the medical problems and current medications, and made medical decisions regarding treatment based on that information. Total visit time 15 min, with total of 10 minutes spent counseling patient regarding: Lifestyle modifications, Sleep hygiene, Study results and Further testing.       RTC in 12 months   He agrees to return sooner on a prn basis if sx change.           This document has been electronically signed by LIBORIO Montiel on July 6, 2021 10:35 CDT            CC: Gilles Canales MD          No ref. provider found

## 2021-07-21 ENCOUNTER — TELEPHONE (OUTPATIENT)
Dept: FAMILY MEDICINE CLINIC | Facility: CLINIC | Age: 51
End: 2021-07-21

## 2021-07-22 ENCOUNTER — OFFICE VISIT (OUTPATIENT)
Dept: FAMILY MEDICINE CLINIC | Facility: CLINIC | Age: 51
End: 2021-07-22

## 2021-07-22 VITALS
TEMPERATURE: 98 F | HEART RATE: 81 BPM | DIASTOLIC BLOOD PRESSURE: 68 MMHG | BODY MASS INDEX: 31.38 KG/M2 | SYSTOLIC BLOOD PRESSURE: 120 MMHG | WEIGHT: 219.2 LBS | HEIGHT: 70 IN | OXYGEN SATURATION: 96 %

## 2021-07-22 DIAGNOSIS — N52.9 ERECTILE DYSFUNCTION, UNSPECIFIED ERECTILE DYSFUNCTION TYPE: ICD-10-CM

## 2021-07-22 DIAGNOSIS — J30.1 SEASONAL ALLERGIC RHINITIS DUE TO POLLEN: ICD-10-CM

## 2021-07-22 DIAGNOSIS — K59.04 CHRONIC IDIOPATHIC CONSTIPATION: ICD-10-CM

## 2021-07-22 PROCEDURE — 99214 OFFICE O/P EST MOD 30 MIN: CPT | Performed by: FAMILY MEDICINE

## 2021-07-22 NOTE — PROGRESS NOTES
Chief Complaint  Fatigue, Snoring, and Allergies    Subjective          Review of Systems   Constitutional: Negative for activity change, appetite change, fatigue and unexpected weight change.   HENT: Positive for postnasal drip and sore throat. Negative for dental problem, drooling, ear discharge, ear pain, facial swelling, hearing loss, mouth sores, nosebleeds, rhinorrhea, sinus pressure, sneezing, tinnitus, trouble swallowing and voice change.    Eyes: Negative for pain and visual disturbance.        Eye exam Vision works   Respiratory: Positive for snoring. Negative for apnea, cough, choking, chest tightness, wheezing and stridor.         Snores wakes up sweating     Cardiovascular: Negative for palpitations and leg swelling.   Gastrointestinal: Negative for abdominal distention, anal bleeding, blood in stool and rectal pain.               Endocrine: Negative for cold intolerance, heat intolerance, polydipsia, polyphagia and polyuria.   Genitourinary: Negative for decreased urine volume, difficulty urinating, enuresis, genital sores, hematuria and penile swelling.        Pain from back shoots into R testicle has resolved   Musculoskeletal: Negative for back pain, gait problem, neck pain and neck stiffness.        No back pain today     Skin: Negative for color change, pallor and wound.   Allergic/Immunologic: Positive for environmental allergies. Negative for food allergies and immunocompromised state.   Neurological: Negative for dizziness, tremors, seizures, syncope, facial asymmetry, speech difficulty and light-headedness.        Daytime sleepiness.   Hematological: Negative for adenopathy. Does not bruise/bleed easily.   Psychiatric/Behavioral: Negative for agitation, behavioral problems, confusion, decreased concentration, dysphoric mood, hallucinations, self-injury, sleep disturbance and suicidal ideas. The patient is not nervous/anxious and is not hyperactive.        Adolfo Barron presents to [facility]  Fatigue  This is a recurrent problem. The current episode started more than 1 month ago. The problem occurs daily. The problem has been waxing and waning. Associated symptoms include a sore throat. Pertinent negatives include no coughing, fatigue or neck pain. Associated symptoms comments: Daytime sleepiness. Exacerbated by: Working night shift. Snores,  He has tried rest (Antibiotics, Antihistamines. ) for the symptoms. The treatment provided mild relief.   Snoring  This is a chronic (Had sleep study don't qaulify for CPAP, have a wedge pillow. ) problem. The current episode started more than 1 year ago. Associated symptoms include a sore throat. Pertinent negatives include no coughing, fatigue or neck pain. Exacerbated by: Sleep position. He has tried position changes for the symptoms. The treatment provided mild relief.   Allergies  This is a chronic problem. The current episode started more than 1 year ago. The problem occurs daily. The problem has been waxing and waning. Associated symptoms include a sore throat. Pertinent negatives include no coughing, fatigue or neck pain. Exacerbated by: Pollen. Treatments tried: Antihistamine. The treatment provided moderate relief.   Cough  This is a chronic problem. The current episode started more than 1 month ago. The problem has been waxing and waning. The problem occurs hourly. Cough characteristics: irritated throat. Associated symptoms include postnasal drip and a sore throat. Pertinent negatives include no ear pain, rhinorrhea or wheezing. He has tried OTC cough suppressant and prescription cough suppressant (Antibiotic, Steroids) for the symptoms. The treatment provided mild relief. His past medical history is significant for environmental allergies.   Erectile Dysfunction  This is a chronic problem. The current episode started more than 1 year ago. He reports no anxiety. Pertinent negatives include no hematuria. Past treatments  "include sildenafil. The treatment provided moderate relief.       Objective   Vital Signs:   /68 (BP Location: Left arm, Patient Position: Sitting, Cuff Size: Adult)   Pulse 81   Temp 98 °F (36.7 °C) (Temporal)   Ht 177.8 cm (70\")   Wt 99.4 kg (219 lb 3.2 oz)   SpO2 96%   BMI 31.45 kg/m²     Physical Exam  Vitals and nursing note reviewed.   Constitutional:       General: He is not in acute distress.     Appearance: Normal appearance. He is well-developed. He is not ill-appearing, toxic-appearing or diaphoretic.   HENT:      Head: Normocephalic.      Right Ear: Tympanic membrane, ear canal and external ear normal. There is no impacted cerumen.      Left Ear: Tympanic membrane, ear canal and external ear normal. There is no impacted cerumen.      Nose: Nose normal. No congestion or rhinorrhea.   Eyes:      General: No scleral icterus.        Right eye: No discharge.         Left eye: No discharge.      Conjunctiva/sclera: Conjunctivae normal.      Pupils: Pupils are equal, round, and reactive to light.   Neck:      Thyroid: No thyromegaly.      Vascular: No JVD.      Trachea: No tracheal deviation.   Cardiovascular:      Rate and Rhythm: Normal rate and regular rhythm.      Heart sounds: Normal heart sounds. No murmur heard.   No friction rub. No gallop.    Pulmonary:      Effort: Pulmonary effort is normal. No respiratory distress.      Breath sounds: Normal breath sounds. No stridor. No wheezing, rhonchi or rales.   Chest:      Chest wall: No tenderness.   Abdominal:      General: Bowel sounds are normal. There is no distension.      Palpations: Abdomen is soft. There is no mass.      Tenderness: There is no abdominal tenderness. There is no right CVA tenderness, left CVA tenderness, guarding or rebound.      Hernia: No hernia is present.      Comments: No tenderness over liver, no CVA tenderness.    Musculoskeletal:         General: No tenderness or deformity. Normal range of motion.   Lymphadenopathy: "      Cervical: No cervical adenopathy.   Skin:     General: Skin is warm and dry.      Coloration: Skin is not pale.      Findings: No erythema or rash.   Neurological:      Mental Status: He is alert and oriented to person, place, and time.      Cranial Nerves: No cranial nerve deficit.      Motor: No abnormal muscle tone.      Coordination: Coordination normal.      Deep Tendon Reflexes: Reflexes are normal and symmetric. Reflexes normal.   Psychiatric:         Mood and Affect: Mood normal.         Behavior: Behavior normal.         Thought Content: Thought content normal.         Judgment: Judgment normal.        Result Review :                 Assessment and Plan    Diagnoses and all orders for this visit:    1. Seasonal allergic rhinitis due to pollen    2. Erectile dysfunction, unspecified erectile dysfunction type  -     tadalafil (Cialis) 20 MG tablet; Take 1 tablet by mouth Daily As Needed for Erectile Dysfunction.  Dispense: 10 tablet; Refill: 3    3. Chronic idiopathic constipation        Follow Up   Return in about 6 months (around 1/22/2022).  Patient was given instructions and counseling regarding his condition or for health maintenance advice. Please see specific information pulled into the AVS if appropriate.         This document has been electronically signed by Gilles Canales MD

## 2021-07-24 RX ORDER — TADALAFIL 20 MG/1
20 TABLET ORAL DAILY PRN
Qty: 10 TABLET | Refills: 3 | Status: SHIPPED | OUTPATIENT
Start: 2021-07-24 | End: 2021-11-11 | Stop reason: SDUPTHER

## 2021-11-11 DIAGNOSIS — N52.9 ERECTILE DYSFUNCTION, UNSPECIFIED ERECTILE DYSFUNCTION TYPE: ICD-10-CM

## 2021-11-11 RX ORDER — TADALAFIL 20 MG/1
20 TABLET ORAL DAILY PRN
Qty: 10 TABLET | Refills: 3 | Status: SHIPPED | OUTPATIENT
Start: 2021-11-11 | End: 2022-01-20 | Stop reason: SDUPTHER

## 2022-01-19 ENCOUNTER — TELEPHONE (OUTPATIENT)
Dept: FAMILY MEDICINE CLINIC | Facility: CLINIC | Age: 52
End: 2022-01-19

## 2022-01-19 NOTE — TELEPHONE ENCOUNTER
Tried calling to let patient know that due to the weather the office will open at 9 and his appointment needs to be rescheduled  No answer no voice mail will send a message through [employer]

## 2022-01-20 ENCOUNTER — OFFICE VISIT (OUTPATIENT)
Dept: FAMILY MEDICINE CLINIC | Facility: CLINIC | Age: 52
End: 2022-01-20

## 2022-01-20 VITALS
WEIGHT: 238 LBS | OXYGEN SATURATION: 98 % | HEART RATE: 73 BPM | DIASTOLIC BLOOD PRESSURE: 88 MMHG | SYSTOLIC BLOOD PRESSURE: 148 MMHG | BODY MASS INDEX: 34.07 KG/M2 | TEMPERATURE: 97.3 F | HEIGHT: 70 IN

## 2022-01-20 DIAGNOSIS — L84 CORN OF FOOT: ICD-10-CM

## 2022-01-20 DIAGNOSIS — N52.9 ERECTILE DYSFUNCTION, UNSPECIFIED ERECTILE DYSFUNCTION TYPE: ICD-10-CM

## 2022-01-20 DIAGNOSIS — Z00.00 ROUTINE MEDICAL EXAM: ICD-10-CM

## 2022-01-20 DIAGNOSIS — M79.671 RIGHT FOOT PAIN: ICD-10-CM

## 2022-01-20 PROCEDURE — 99213 OFFICE O/P EST LOW 20 MIN: CPT | Performed by: FAMILY MEDICINE

## 2022-01-20 RX ORDER — TADALAFIL 20 MG/1
20 TABLET ORAL DAILY PRN
Qty: 10 TABLET | Refills: 3 | Status: SHIPPED | OUTPATIENT
Start: 2022-01-20 | End: 2022-07-20 | Stop reason: SDUPTHER

## 2022-01-20 NOTE — PROGRESS NOTES
Chief Complaint  Constipation, Hemorrhoids, and Foot Problem (right, achey)    Subjective          Review of Systems   Constitutional: Negative for activity change, appetite change, fatigue and unexpected weight change.   HENT: Negative for dental problem, drooling, ear discharge, ear pain, facial swelling, hearing loss, mouth sores, nosebleeds, postnasal drip, rhinorrhea, sinus pressure, sneezing, sore throat, tinnitus, trouble swallowing and voice change.    Eyes: Negative for pain and visual disturbance.        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         Snores     Cardiovascular: Negative for palpitations and leg swelling.   Gastrointestinal: Positive for constipation and hemorrhoids. Negative for abdominal distention, anal bleeding, blood in stool and rectal pain.               Endocrine: Negative for cold intolerance, heat intolerance, polydipsia, polyphagia and polyuria.   Genitourinary: Negative for decreased urine volume, difficulty urinating, enuresis, genital sores, hematuria and penile swelling.   Musculoskeletal: Negative for back pain, gait problem, neck pain and neck stiffness.        No back pain today  Pain top of R foot near ankle, Tip of R big toe, and small corn on sole of R foot.    Skin: Negative for color change, pallor and wound.   Allergic/Immunologic: Positive for environmental allergies. Negative for food allergies and immunocompromised state.   Neurological: Negative for dizziness, tremors, seizures, syncope, facial asymmetry, speech difficulty and light-headedness.        Daytime sleepiness.   Hematological: Negative for adenopathy. Does not bruise/bleed easily.   Psychiatric/Behavioral: Negative for agitation, behavioral problems, confusion, decreased concentration, dysphoric mood, hallucinations, self-injury, sleep disturbance and suicidal ideas. The patient is not nervous/anxious and is not hyperactive.   "      Adolfo Barron presents to [facility] [place]  Constipation  This is a chronic problem. The problem has been gradually improving since onset. Associated symptoms include hemorrhoids. Pertinent negatives include no back pain, difficulty urinating or rectal pain. He has tried laxatives and stool softeners for the symptoms. The treatment provided significant relief.   Hemorrhoids  This is a chronic problem. The current episode started more than 1 year ago. The problem occurs intermittently. The problem has been gradually improving. Pertinent negatives include no coughing, fatigue, neck pain or sore throat. The symptoms are aggravated by stress. Treatments tried: HC cream. The treatment provided significant relief.   Foot Problem  This is a new problem. The current episode started more than 1 month ago. The problem occurs daily. The problem has been gradually worsening. Pertinent negatives include no coughing, fatigue, neck pain or sore throat. The symptoms are aggravated by walking and standing. He has tried NSAIDs, acetaminophen and rest for the symptoms. The treatment provided no relief.       Objective   Vital Signs:   /88 (BP Location: Right arm, Patient Position: Sitting, Cuff Size: Adult)   Pulse 73   Temp 97.3 °F (36.3 °C) (Temporal)   Ht 177.8 cm (70\")   Wt 108 kg (238 lb)   SpO2 98%   BMI 34.15 kg/m²     Physical Exam  Vitals and nursing note reviewed.   Constitutional:       General: He is not in acute distress.     Appearance: Normal appearance. He is well-developed. He is not ill-appearing, toxic-appearing or diaphoretic.   HENT:      Head: Normocephalic.      Right Ear: Tympanic membrane, ear canal and external ear normal. There is no impacted cerumen.      Left Ear: Tympanic membrane, ear canal and external ear normal. There is no impacted cerumen.      Nose: Nose normal. No congestion or rhinorrhea.      Mouth/Throat:      Mouth: Mucous " membranes are moist.      Pharynx: Oropharynx is clear. No oropharyngeal exudate or posterior oropharyngeal erythema.   Eyes:      General: No scleral icterus.        Right eye: No discharge.         Left eye: No discharge.      Extraocular Movements: Extraocular movements intact.      Conjunctiva/sclera: Conjunctivae normal.      Pupils: Pupils are equal, round, and reactive to light.   Neck:      Thyroid: No thyromegaly.      Vascular: No JVD.      Trachea: No tracheal deviation.   Cardiovascular:      Rate and Rhythm: Normal rate and regular rhythm.      Heart sounds: Normal heart sounds. No murmur heard.  No friction rub. No gallop.    Pulmonary:      Effort: Pulmonary effort is normal. No respiratory distress.      Breath sounds: Normal breath sounds. No stridor. No wheezing, rhonchi or rales.   Chest:      Chest wall: No tenderness.   Abdominal:      General: Bowel sounds are normal. There is no distension.      Palpations: Abdomen is soft. There is no mass.      Tenderness: There is no abdominal tenderness. There is no right CVA tenderness, left CVA tenderness, guarding or rebound.      Hernia: No hernia is present.   Musculoskeletal:         General: Tenderness present. No swelling or deformity.      Right lower leg: No edema.      Left lower leg: No edema.      Comments: Tenderness proximal arch of foot, no redness or swellinf. Tenderness tip of R great toe.    Lymphadenopathy:      Cervical: No cervical adenopathy.   Skin:     General: Skin is warm and dry.      Capillary Refill: Capillary refill takes 2 to 3 seconds.      Coloration: Skin is not jaundiced or pale.      Findings: Lesion present. No bruising, erythema or rash.      Comments: Small corn R lateral sole.    Neurological:      General: No focal deficit present.      Mental Status: He is alert and oriented to person, place, and time.      Cranial Nerves: No cranial nerve deficit.      Motor: No weakness or abnormal muscle tone.       Coordination: Coordination normal.      Gait: Gait normal.      Deep Tendon Reflexes: Reflexes are normal and symmetric. Reflexes normal.   Psychiatric:         Mood and Affect: Mood normal.         Behavior: Behavior normal.         Thought Content: Thought content normal.         Judgment: Judgment normal.        Result Review :                 Assessment and Plan    Diagnoses and all orders for this visit:    1. Right foot pain  -     XR Foot 3+ View Right (In Office)  -     Ambulatory Referral to Podiatry    2. [place] of foot  -     Ambulatory Referral to Podiatry    3. Erectile dysfunction, unspecified erectile dysfunction type  -     tadalafil (Cialis) 20 MG tablet; Take 1 tablet by mouth Daily As Needed for Erectile Dysfunction.  Dispense: 10 tablet; Refill: 3    4. Routine medical exam        Follow Up   Return in about 6 months (around 7/20/2022).  Patient was given instructions and counseling regarding his condition or for health maintenance advice. Please see specific information pulled into the AVS if appropriate.         This document has been electronically signed by Gilles Canales MD on January 20, 2022 11:58 CST

## 2022-02-15 ENCOUNTER — OFFICE VISIT (OUTPATIENT)
Dept: PODIATRY | Facility: CLINIC | Age: 52
End: 2022-02-15

## 2022-02-15 VITALS — BODY MASS INDEX: 34.07 KG/M2 | WEIGHT: 238 LBS | HEIGHT: 70 IN | HEART RATE: 82 BPM | OXYGEN SATURATION: 99 %

## 2022-02-15 DIAGNOSIS — M21.42 PES PLANUS OF BOTH FEET: ICD-10-CM

## 2022-02-15 DIAGNOSIS — M21.41 PES PLANUS OF BOTH FEET: ICD-10-CM

## 2022-02-15 DIAGNOSIS — M79.672 FOOT PAIN, BILATERAL: Primary | ICD-10-CM

## 2022-02-15 DIAGNOSIS — M79.671 FOOT PAIN, BILATERAL: Primary | ICD-10-CM

## 2022-02-15 PROCEDURE — 99203 OFFICE O/P NEW LOW 30 MIN: CPT | Performed by: PODIATRIST

## 2022-02-15 NOTE — PROGRESS NOTES
"Adolfo Barron  1970  51 y.o. male     Patient returns today for right hallux pain. Xray obtained on 1/20/2022 2/19/2022  Chief Complaint   Patient presents with   • Right Foot - Pain       History of Present Illness    Pleasant 51-year-old male presents to clinic with chief complaint of foot pain.  Pain is primarily localized to the inside of the right foot.  It started about a month ago.  No associated injuries.    Past Medical History:   Diagnosis Date   • Acute bronchitis    • Acute otitis media    • Acute sinusitis    • Allergic rhinitis    • Biceps femoris tendinitis    • Candidiasis of skin and nails    • Chronic allergic conjunctivitis    • Cough    • Upper respiratory infection          History reviewed. No pertinent surgical history.      History reviewed. No pertinent family history.      Social History     Socioeconomic History   • Marital status:    Tobacco Use   • Smoking status: Never Smoker   • Smokeless tobacco: Never Used   Vaping Use   • Vaping Use: Never used   Substance and Sexual Activity   • Alcohol use: No   • Drug use: Defer   • Sexual activity: Defer         Current Outpatient Medications   Medication Sig Dispense Refill   • tadalafil (Cialis) 20 MG tablet Take 1 tablet by mouth Daily As Needed for Erectile Dysfunction. 10 tablet 3     No current facility-administered medications for this visit.     Review of Systems   Constitutional: Negative.    Respiratory: Negative.    Cardiovascular: Negative.    Gastrointestinal: Negative.    Musculoskeletal:        Foot pain      Psychiatric/Behavioral: Negative.         OBJECTIVE    Pulse 82   Ht 177.8 cm (70\")   Wt 108 kg (238 lb)   SpO2 99%   BMI 34.15 kg/m²     Physical Exam  Vitals reviewed.   Constitutional:       General: He is not in acute distress.     Appearance: He is well-developed.   HENT:      Head: Normocephalic and atraumatic.      Nose: Nose normal.   Eyes:      Conjunctiva/sclera: Conjunctivae normal.      " Pupils: Pupils are equal, round, and reactive to light.   Pulmonary:      Effort: Pulmonary effort is normal. No respiratory distress.      Breath sounds: No wheezing.   Musculoskeletal:         General: Tenderness present. No deformity. Normal range of motion.   Skin:     General: Skin is warm and dry.      Capillary Refill: Capillary refill takes less than 2 seconds.   Neurological:      Mental Status: He is alert and oriented to person, place, and time.   Psychiatric:         Behavior: Behavior normal.         Thought Content: Thought content normal.       Lower Extremity:     Cardiovascular:    DP/PT pulses palpable    CFT brisk  to all digits  Skin temp is warm to warm from proximal tibia to distal digits  Pedal hair growth present.   No erythema or edema noted   Musculoskeletal:  Muscle strength is 5/5 for all muscle groups tested   ROM of the 1st MTP is full without pain or crepitus  ROM of the MTJ is full without pain or crepitus    ROM of the STJ is full without pain or crepitus    ROM of the ankle joint is full without pain or crepitus    Pes planus  Dermatological:   Skin is warm, dry and intact    Webspaces 1-4 bilateral are clean, dry and intact.   No subcutaneous nodules or masses noted    No open wounds noted   Multiple small portal keratoses noted to plantar feet  Neurological:   Protective sensation intact    Sensation intact to light touch                Procedures        ASSESSMENT AND PLAN    Diagnoses and all orders for this visit:    1. Foot pain, bilateral (Primary)    2. Pes planus of both feet      - Patient examined and evaluated  -Imaging of the right foot reviewed.  No acute pathology.  -Recommended OTC arch supports.  Briefly discussed custom arch supports.  - All questions were answered   - RTC as needed              This document has been electronically signed by Todd Spear DPM on February 19, 2022 12:26 CST     2/19/2022  12:26 CST

## 2022-07-20 ENCOUNTER — OFFICE VISIT (OUTPATIENT)
Dept: FAMILY MEDICINE CLINIC | Facility: CLINIC | Age: 52
End: 2022-07-20

## 2022-07-20 VITALS
DIASTOLIC BLOOD PRESSURE: 88 MMHG | HEIGHT: 70 IN | WEIGHT: 231.8 LBS | BODY MASS INDEX: 33.18 KG/M2 | OXYGEN SATURATION: 99 % | TEMPERATURE: 98.4 F | HEART RATE: 79 BPM | SYSTOLIC BLOOD PRESSURE: 122 MMHG

## 2022-07-20 DIAGNOSIS — R53.83 FATIGUE, UNSPECIFIED TYPE: ICD-10-CM

## 2022-07-20 DIAGNOSIS — N52.9 ERECTILE DYSFUNCTION, UNSPECIFIED ERECTILE DYSFUNCTION TYPE: ICD-10-CM

## 2022-07-20 DIAGNOSIS — M79.671 RIGHT FOOT PAIN: ICD-10-CM

## 2022-07-20 DIAGNOSIS — Z12.11 SCREENING FOR COLON CANCER: ICD-10-CM

## 2022-07-20 DIAGNOSIS — Z12.5 SCREENING FOR PROSTATE CANCER: ICD-10-CM

## 2022-07-20 DIAGNOSIS — Z00.00 PREVENTATIVE HEALTH CARE: Primary | ICD-10-CM

## 2022-07-20 PROCEDURE — 99214 OFFICE O/P EST MOD 30 MIN: CPT | Performed by: FAMILY MEDICINE

## 2022-07-20 RX ORDER — TADALAFIL 20 MG/1
20 TABLET ORAL DAILY PRN
Qty: 10 TABLET | Refills: 3 | Status: SHIPPED | OUTPATIENT
Start: 2022-07-20

## 2022-07-20 NOTE — PROGRESS NOTES
Chief Complaint  Constipation, Allergies, Sciatica, R Rib pains, Foot Pain (Right/), and Erectile Dysfunction    Subjective          Review of Systems   Constitutional: Negative for activity change, appetite change and unexpected weight change.   HENT: Negative for dental problem, drooling, ear discharge, ear pain, facial swelling, hearing loss, mouth sores, nosebleeds, postnasal drip, rhinorrhea, sinus pressure, sneezing, tinnitus, trouble swallowing and voice change.    Eyes: Negative for pain and visual disturbance.        Eye exam Vision works   Respiratory: Negative for apnea, choking, chest tightness, wheezing and stridor.         Snores     Cardiovascular: Negative for leg swelling.        Have Rib pains and twinges , had old injury at site.    Gastrointestinal: Negative for abdominal distention, anal bleeding, blood in stool, constipation and rectal pain.               Endocrine: Negative for cold intolerance, heat intolerance, polydipsia, polyphagia and polyuria.   Genitourinary: Negative for decreased urine volume, difficulty urinating, enuresis, genital sores, hematuria and penile swelling.   Musculoskeletal: Negative for back pain, gait problem and neck stiffness.        No back pain today  Pain top of R foot near ankle, Tip of R big toe, and small corn on sole of R foot.    Skin: Negative for color change, pallor and wound.   Allergic/Immunologic: Positive for environmental allergies. Negative for food allergies and immunocompromised state.   Neurological: Negative for tremors, seizures, syncope, facial asymmetry, speech difficulty and light-headedness.        Daytime sleepiness.   Hematological: Negative for adenopathy. Does not bruise/bleed easily.   Psychiatric/Behavioral: Negative for agitation, behavioral problems, confusion, decreased concentration, dysphoric mood, hallucinations, self-injury, sleep disturbance and suicidal ideas. The patient is not hyperactive.        Adlofo Barron presents  "to [facility]  Constipation  This is a chronic problem. The problem has been gradually improving since onset. Pertinent negatives include no back pain, difficulty urinating or rectal pain. He has tried laxatives and stool softeners for the symptoms. The treatment provided significant relief.   Allergies  This is a chronic problem. Exacerbated by: Seasonal and environmental allergens. Treatments tried: Allergy meds. The treatment provided moderate relief.   Sciatica  This is a chronic problem. He has tried NSAIDs, acetaminophen and relaxation (Exercises) for the symptoms. The treatment provided moderate relief.   Foot Pain  This is a chronic problem. The current episode started more than 1 month ago. The problem occurs daily. The problem has been waxing and waning. The symptoms are aggravated by standing and walking. Treatments tried: Referral to podiatry. The treatment provided no relief.   Erectile Dysfunction  This is a chronic problem. The current episode started more than 1 year ago. Pertinent negatives include no hematuria. Past treatments include sildenafil. The treatment provided moderate relief.       Objective   Vital Signs:   /88 (BP Location: Right arm, Patient Position: Sitting, Cuff Size: Adult)   Pulse 79   Temp 98.4 °F (36.9 °C) (Temporal)   Ht 177.8 cm (70\")   Wt 105 kg (231 lb 12.8 oz)   SpO2 99%   BMI 33.26 kg/m²     Physical Exam  Vitals and nursing note reviewed.   Constitutional:       General: He is not in acute distress.     Appearance: Normal appearance. He is well-developed. He is not ill-appearing, toxic-appearing or diaphoretic.   HENT:      Head: Normocephalic.      Right Ear: Tympanic membrane, ear canal and external ear normal. There is no impacted cerumen.      Left Ear: Tympanic membrane, ear canal and external ear normal. There is no impacted cerumen.      Nose: Nose normal. No congestion or rhinorrhea.      " Mouth/Throat:      Mouth: Mucous membranes are moist.      Pharynx: Oropharynx is clear. No oropharyngeal exudate or posterior oropharyngeal erythema.   Eyes:      General: No scleral icterus.        Right eye: No discharge.         Left eye: No discharge.      Extraocular Movements: Extraocular movements intact.      Conjunctiva/sclera: Conjunctivae normal.      Pupils: Pupils are equal, round, and reactive to light.   Neck:      Thyroid: No thyromegaly.      Vascular: No JVD.      Trachea: No tracheal deviation.   Cardiovascular:      Rate and Rhythm: Normal rate and regular rhythm.      Heart sounds: Normal heart sounds. No murmur heard.    No friction rub. No gallop.   Pulmonary:      Effort: Pulmonary effort is normal. No respiratory distress.      Breath sounds: Normal breath sounds. No stridor. No wheezing, rhonchi or rales.   Chest:      Chest wall: No tenderness.   Abdominal:      General: Bowel sounds are normal. There is no distension.      Palpations: Abdomen is soft. There is no mass.      Tenderness: There is no abdominal tenderness. There is no right CVA tenderness, left CVA tenderness, guarding or rebound.      Hernia: No hernia is present.   Musculoskeletal:         General: Tenderness present. No swelling or deformity. Normal range of motion.      Right lower leg: No edema.      Left lower leg: No edema.      Comments: Tenderness proximal arch of foot, no redness or swellinf. Tenderness tip of R great toe.   No tenderness Rib cage on palpitation   Lymphadenopathy:      Cervical: No cervical adenopathy.   Skin:     General: Skin is warm and dry.      Capillary Refill: Capillary refill takes 2 to 3 seconds.      Coloration: Skin is not jaundiced or pale.      Findings: Lesion present. No bruising, erythema or rash.      Comments: Small corn R lateral sole.    Neurological:      General: No focal deficit present.      Mental Status: He is alert and oriented to person, place, and time.      Cranial  Nerves: No cranial nerve deficit.      Motor: No weakness or abnormal muscle tone.      Coordination: Coordination normal.      Gait: Gait normal.      Deep Tendon Reflexes: Reflexes are normal and symmetric. Reflexes normal.   Psychiatric:         Mood and Affect: Mood normal.         Behavior: Behavior normal.         Thought Content: Thought content normal.         Judgment: Judgment normal.        Result Review :                 Assessment and Plan    Diagnoses and all orders for this visit:    1. Preventative health care (Primary)  -     CBC & Differential; Future  -     Comprehensive metabolic panel; Future  -     TSH; Future  -     Lipid Panel; Future  -     Urinalysis With Culture If Indicated -; Future    2. Erectile dysfunction, unspecified erectile dysfunction type  -     tadalafil (Cialis) 20 MG tablet; Take 1 tablet by mouth Daily As Needed for Erectile Dysfunction.  Dispense: 10 tablet; Refill: 3  -     CBC & Differential; Future  -     Comprehensive metabolic panel; Future  -     TSH; Future  -     Lipid Panel; Future  -     Urinalysis With Culture If Indicated -; Future    3. Fatigue, unspecified type  -     CBC & Differential; Future  -     Comprehensive metabolic panel; Future  -     TSH; Future  -     Lipid Panel; Future  -     Urinalysis With Culture If Indicated -; Future    4. Right foot pain  -     Diclofenac Sodium (VOLTAREN) 1 % gel gel; Apply 4 g topically to the appropriate area as directed 3 (Three) Times a Day.  Dispense: 150 g; Refill: 2    5. Screening for colon cancer  -     Ambulatory Referral For Screening Colonoscopy    6. Screening for prostate cancer  -     PSA SCREENING; Future        Follow Up   Return in about 6 months (around 1/20/2023).  Patient was given instructions and counseling regarding his condition or for health maintenance advice. Please see specific information pulled into the AVS if appropriate.         This document has been electronically signed by Gilles Murillo  MD Parker on July 20, 2022 17:16 CDT

## 2022-08-19 ENCOUNTER — LAB (OUTPATIENT)
Dept: LAB | Facility: HOSPITAL | Age: 52
End: 2022-08-19

## 2022-08-19 DIAGNOSIS — Z00.00 PREVENTATIVE HEALTH CARE: ICD-10-CM

## 2022-08-19 DIAGNOSIS — N52.9 ERECTILE DYSFUNCTION, UNSPECIFIED ERECTILE DYSFUNCTION TYPE: ICD-10-CM

## 2022-08-19 DIAGNOSIS — Z12.5 SCREENING FOR PROSTATE CANCER: ICD-10-CM

## 2022-08-19 DIAGNOSIS — R53.83 FATIGUE, UNSPECIFIED TYPE: ICD-10-CM

## 2022-08-19 PROCEDURE — G0103 PSA SCREENING: HCPCS

## 2022-08-19 PROCEDURE — 81003 URINALYSIS AUTO W/O SCOPE: CPT

## 2022-08-19 PROCEDURE — 85025 COMPLETE CBC W/AUTO DIFF WBC: CPT

## 2022-08-19 PROCEDURE — 80053 COMPREHEN METABOLIC PANEL: CPT

## 2022-08-19 PROCEDURE — 80061 LIPID PANEL: CPT

## 2022-08-19 PROCEDURE — 84443 ASSAY THYROID STIM HORMONE: CPT

## 2022-08-20 LAB
ALBUMIN SERPL-MCNC: 4 G/DL (ref 3.5–5.2)
ALBUMIN/GLOB SERPL: 1.6 G/DL
ALP SERPL-CCNC: 59 U/L (ref 39–117)
ALT SERPL W P-5'-P-CCNC: 20 U/L (ref 1–41)
ANION GAP SERPL CALCULATED.3IONS-SCNC: 8 MMOL/L (ref 5–15)
AST SERPL-CCNC: 22 U/L (ref 1–40)
BASOPHILS # BLD AUTO: 0.04 10*3/MM3 (ref 0–0.2)
BASOPHILS NFR BLD AUTO: 1 % (ref 0–1.5)
BILIRUB SERPL-MCNC: 0.3 MG/DL (ref 0–1.2)
BILIRUB UR QL STRIP: NEGATIVE
BUN SERPL-MCNC: 15 MG/DL (ref 6–20)
BUN/CREAT SERPL: 12.6 (ref 7–25)
CALCIUM SPEC-SCNC: 9.2 MG/DL (ref 8.6–10.5)
CHLORIDE SERPL-SCNC: 106 MMOL/L (ref 98–107)
CHOLEST SERPL-MCNC: 173 MG/DL (ref 0–200)
CLARITY UR: CLEAR
CO2 SERPL-SCNC: 25 MMOL/L (ref 22–29)
COLOR UR: YELLOW
CREAT SERPL-MCNC: 1.19 MG/DL (ref 0.76–1.27)
DEPRECATED RDW RBC AUTO: 44.3 FL (ref 37–54)
EGFRCR SERPLBLD CKD-EPI 2021: 73.5 ML/MIN/1.73
EOSINOPHIL # BLD AUTO: 0.13 10*3/MM3 (ref 0–0.4)
EOSINOPHIL NFR BLD AUTO: 3.3 % (ref 0.3–6.2)
ERYTHROCYTE [DISTWIDTH] IN BLOOD BY AUTOMATED COUNT: 13.7 % (ref 12.3–15.4)
GLOBULIN UR ELPH-MCNC: 2.5 GM/DL
GLUCOSE SERPL-MCNC: 77 MG/DL (ref 65–99)
GLUCOSE UR STRIP-MCNC: NEGATIVE MG/DL
HCT VFR BLD AUTO: 44.9 % (ref 37.5–51)
HDLC SERPL-MCNC: 41 MG/DL (ref 40–60)
HGB BLD-MCNC: 14.9 G/DL (ref 13–17.7)
HGB UR QL STRIP.AUTO: NEGATIVE
IMM GRANULOCYTES # BLD AUTO: 0.01 10*3/MM3 (ref 0–0.05)
IMM GRANULOCYTES NFR BLD AUTO: 0.3 % (ref 0–0.5)
KETONES UR QL STRIP: NEGATIVE
LDLC SERPL CALC-MCNC: 115 MG/DL (ref 0–100)
LDLC/HDLC SERPL: 2.78 {RATIO}
LEUKOCYTE ESTERASE UR QL STRIP.AUTO: NEGATIVE
LYMPHOCYTES # BLD AUTO: 1.62 10*3/MM3 (ref 0.7–3.1)
LYMPHOCYTES NFR BLD AUTO: 40.7 % (ref 19.6–45.3)
MCH RBC QN AUTO: 29.1 PG (ref 26.6–33)
MCHC RBC AUTO-ENTMCNC: 33.2 G/DL (ref 31.5–35.7)
MCV RBC AUTO: 87.7 FL (ref 79–97)
MONOCYTES # BLD AUTO: 0.56 10*3/MM3 (ref 0.1–0.9)
MONOCYTES NFR BLD AUTO: 14.1 % (ref 5–12)
NEUTROPHILS NFR BLD AUTO: 1.62 10*3/MM3 (ref 1.7–7)
NEUTROPHILS NFR BLD AUTO: 40.6 % (ref 42.7–76)
NITRITE UR QL STRIP: NEGATIVE
NRBC BLD AUTO-RTO: 0 /100 WBC (ref 0–0.2)
PH UR STRIP.AUTO: 5.5 [PH] (ref 5–8)
PLATELET # BLD AUTO: 272 10*3/MM3 (ref 140–450)
PMV BLD AUTO: 11.1 FL (ref 6–12)
POTASSIUM SERPL-SCNC: 4.5 MMOL/L (ref 3.5–5.2)
PROT SERPL-MCNC: 6.5 G/DL (ref 6–8.5)
PROT UR QL STRIP: NEGATIVE
PSA SERPL-MCNC: 0.5 NG/ML (ref 0–4)
RBC # BLD AUTO: 5.12 10*6/MM3 (ref 4.14–5.8)
SODIUM SERPL-SCNC: 139 MMOL/L (ref 136–145)
SP GR UR STRIP: 1.02 (ref 1–1.03)
TRIGL SERPL-MCNC: 91 MG/DL (ref 0–150)
TSH SERPL DL<=0.05 MIU/L-ACNC: 2.95 UIU/ML (ref 0.27–4.2)
UROBILINOGEN UR QL STRIP: NORMAL
VLDLC SERPL-MCNC: 17 MG/DL (ref 5–40)
WBC NRBC COR # BLD: 3.98 10*3/MM3 (ref 3.4–10.8)

## 2022-08-25 ENCOUNTER — TELEPHONE (OUTPATIENT)
Dept: FAMILY MEDICINE CLINIC | Facility: CLINIC | Age: 52
End: 2022-08-25

## 2022-08-25 NOTE — TELEPHONE ENCOUNTER
----- Message from Gilles Canales MD sent at 8/22/2022  7:14 PM CDT -----  Labs are good will go over at next visit

## 2022-10-17 ENCOUNTER — OFFICE VISIT (OUTPATIENT)
Dept: FAMILY MEDICINE CLINIC | Facility: CLINIC | Age: 52
End: 2022-10-17

## 2022-10-17 VITALS
SYSTOLIC BLOOD PRESSURE: 134 MMHG | HEART RATE: 100 BPM | TEMPERATURE: 97.3 F | DIASTOLIC BLOOD PRESSURE: 80 MMHG | OXYGEN SATURATION: 98 % | BODY MASS INDEX: 34.2 KG/M2 | HEIGHT: 70 IN | WEIGHT: 238.9 LBS

## 2022-10-17 DIAGNOSIS — H66.92 LEFT OTITIS MEDIA, UNSPECIFIED OTITIS MEDIA TYPE: Primary | ICD-10-CM

## 2022-10-17 PROCEDURE — 99213 OFFICE O/P EST LOW 20 MIN: CPT | Performed by: FAMILY MEDICINE

## 2022-10-17 RX ORDER — BROMPHENIRAMINE MALEATE, PSEUDOEPHEDRINE HYDROCHLORIDE, AND DEXTROMETHORPHAN HYDROBROMIDE 2; 30; 10 MG/5ML; MG/5ML; MG/5ML
5 SYRUP ORAL 3 TIMES DAILY PRN
Qty: 118 ML | Refills: 1 | Status: SHIPPED | OUTPATIENT
Start: 2022-10-17 | End: 2023-01-20

## 2022-10-17 RX ORDER — AMOXICILLIN AND CLAVULANATE POTASSIUM 875; 125 MG/1; MG/1
1 TABLET, FILM COATED ORAL 2 TIMES DAILY
Qty: 14 TABLET | Refills: 0 | Status: SHIPPED | OUTPATIENT
Start: 2022-10-17 | End: 2023-01-20

## 2022-10-17 RX ORDER — AZITHROMYCIN 500 MG/1
TABLET, FILM COATED ORAL
COMMUNITY
Start: 2022-10-08 | End: 2022-10-17

## 2022-10-17 RX ORDER — DICLOFENAC SODIUM 75 MG/1
TABLET, DELAYED RELEASE ORAL
COMMUNITY
Start: 2022-10-08 | End: 2022-10-17

## 2022-10-17 RX ORDER — CEFDINIR 300 MG/1
CAPSULE ORAL
COMMUNITY
Start: 2022-09-21 | End: 2022-10-17

## 2022-10-17 RX ORDER — PREDNISONE 10 MG/1
10 TABLET ORAL SEE ADMIN INSTRUCTIONS
Qty: 17 TABLET | Refills: 0 | Status: SHIPPED | OUTPATIENT
Start: 2022-10-17 | End: 2023-01-20

## 2022-10-17 RX ORDER — LORATADINE AND PSEUDOEPHEDRINE SULFATE 10; 240 MG/1; MG/1
TABLET, FILM COATED, EXTENDED RELEASE ORAL
COMMUNITY
Start: 2022-09-21 | End: 2022-10-17

## 2022-10-17 NOTE — PROGRESS NOTES
Answers for HPI/ROS submitted by the patient on 10/17/2022  What is the primary reason for your visit?: Ear Pain  Affected ear: left  Chronicity: new  Onset: 1 to 4 weeks ago  Progression since onset: waxing and waning  Frequency: constantly  Fever: no fever  Pain - numeric: 6/10  abdominal pain: No  ear discharge: No  rash: No  cough: No  headaches: No  rhinorrhea: No  diarrhea: No  hearing loss: No  sore throat: No  neck pain: No  vomiting: No

## 2022-10-18 NOTE — PROGRESS NOTES
"Chief Complaint  Earache (Left/)    Subjective          Review of Systems   HENT: Positive for ear pain. Negative for ear discharge, hearing loss, rhinorrhea and sore throat.    Respiratory: Negative for cough.    Gastrointestinal: Negative for abdominal pain, diarrhea and vomiting.   Musculoskeletal: Negative for neck pain.   Skin: Negative for rash.   Neurological: Negative for headaches.       Adolfo Barron presents to [facility] - [place]  Earache   There is pain in the left ear. This is a new problem. The current episode started 1 to 4 weeks ago. The problem occurs constantly. The problem has been waxing and waning. There has been no fever. The pain is at a severity of 6/10. Pertinent negatives include no abdominal pain, coughing, diarrhea, ear discharge, headaches, hearing loss, neck pain, rash, rhinorrhea, sore throat or vomiting. He has tried antibiotics, acetaminophen and NSAIDs for the symptoms. The treatment provided mild relief.       Objective   Vital Signs:   /80 (BP Location: Left arm, Patient Position: Sitting, Cuff Size: Adult)   Pulse 100   Temp 97.3 °F (36.3 °C) (Temporal)   Ht 177.8 cm (70\")   Wt 108 kg (238 lb 14.4 oz)   SpO2 98%   BMI 34.28 kg/m²     Physical Exam  Vitals and nursing note reviewed.   Constitutional:       General: He is not in acute distress.     Appearance: Normal appearance. He is well-developed. He is not ill-appearing, toxic-appearing or diaphoretic.   HENT:      Head: Normocephalic.      Right Ear: Tympanic membrane, ear canal and external ear normal. There is no impacted cerumen.      Left Ear: Tympanic membrane, ear canal and external ear normal. There is no impacted cerumen.      Ears:      Comments: Tan fluid behind TM, can't Clear ET on L     Nose: Nose normal. No congestion or rhinorrhea.      Mouth/Throat:      Mouth: Mucous membranes are moist.      Pharynx: Oropharynx is clear. No oropharyngeal exudate " or posterior oropharyngeal erythema.   Eyes:      General: No scleral icterus.        Right eye: No discharge.         Left eye: No discharge.      Extraocular Movements: Extraocular movements intact.      Conjunctiva/sclera: Conjunctivae normal.      Pupils: Pupils are equal, round, and reactive to light.   Neck:      Thyroid: No thyromegaly.      Vascular: No JVD.      Trachea: No tracheal deviation.   Cardiovascular:      Rate and Rhythm: Normal rate and regular rhythm.      Heart sounds: Normal heart sounds. No murmur heard.    No friction rub. No gallop.   Pulmonary:      Effort: Pulmonary effort is normal. No respiratory distress.      Breath sounds: Normal breath sounds. No stridor. No wheezing, rhonchi or rales.   Chest:      Chest wall: No tenderness.   Abdominal:      General: Bowel sounds are normal. There is no distension.      Palpations: Abdomen is soft. There is no mass.      Tenderness: There is no abdominal tenderness. There is no right CVA tenderness, left CVA tenderness, guarding or rebound.      Hernia: No hernia is present.   Musculoskeletal:         General: No swelling, tenderness or deformity. Normal range of motion.      Right lower leg: No edema.      Left lower leg: No edema.   Lymphadenopathy:      Cervical: No cervical adenopathy.   Skin:     General: Skin is warm and dry.      Capillary Refill: Capillary refill takes 2 to 3 seconds.      Coloration: Skin is not jaundiced or pale.      Findings: No bruising, erythema, lesion or rash.   Neurological:      General: No focal deficit present.      Mental Status: He is alert and oriented to person, place, and time.      Cranial Nerves: No cranial nerve deficit.      Motor: No weakness or abnormal muscle tone.      Coordination: Coordination normal.      Gait: Gait normal.      Deep Tendon Reflexes: Reflexes are normal and symmetric. Reflexes normal.   Psychiatric:         Mood and Affect: Mood normal.         Behavior: Behavior normal.          Thought Content: Thought content normal.         Judgment: Judgment normal.        Result Review :                 Assessment and Plan    Diagnoses and all orders for this visit:    1. Left otitis media, unspecified otitis media type (Primary)  -     predniSONE (DELTASONE) 10 MG tablet; Take 1 tablet by mouth See Admin Instructions. Take 1 Tab Tid x3 days, Then 1 Tab Bid x 2 days Then 1 Tab QD x4 days,then D/C  Dispense: 17 tablet; Refill: 0  -     amoxicillin-clavulanate (Augmentin) 875-125 MG per tablet; Take 1 tablet by mouth 2 (Two) Times a Day.  Dispense: 14 tablet; Refill: 0  -     brompheniramine-pseudoephedrine-DM 30-2-10 MG/5ML syrup; Take 5 mL by mouth 3 (Three) Times a Day As Needed for Congestion.  Dispense: 118 mL; Refill: 1        Follow Up   Return if symptoms worsen or fail to improve, for Keep , Next scheduled follow up.  Patient was given instructions and counseling regarding his condition or for health maintenance advice. Please see specific information pulled into the AVS if appropriate.           This document has been electronically signed by Gilles Canales MD on October 17, 2022 19:39 CDT      Answers for HPI/ROS submitted by the patient on 10/17/2022  What is the primary reason for your visit?: Ear Pain

## 2023-01-20 ENCOUNTER — OFFICE VISIT (OUTPATIENT)
Dept: FAMILY MEDICINE CLINIC | Facility: CLINIC | Age: 53
End: 2023-01-20
Payer: COMMERCIAL

## 2023-01-20 VITALS
SYSTOLIC BLOOD PRESSURE: 128 MMHG | BODY MASS INDEX: 34.26 KG/M2 | HEART RATE: 79 BPM | DIASTOLIC BLOOD PRESSURE: 86 MMHG | WEIGHT: 239.3 LBS | OXYGEN SATURATION: 98 % | HEIGHT: 70 IN | TEMPERATURE: 97.3 F

## 2023-01-20 DIAGNOSIS — H66.92 LEFT OTITIS MEDIA, UNSPECIFIED OTITIS MEDIA TYPE: Chronic | ICD-10-CM

## 2023-01-20 DIAGNOSIS — Z91.09 ENVIRONMENTAL ALLERGIES: Chronic | ICD-10-CM

## 2023-01-20 DIAGNOSIS — K59.04 CHRONIC IDIOPATHIC CONSTIPATION: Chronic | ICD-10-CM

## 2023-01-20 DIAGNOSIS — J01.41 ACUTE RECURRENT PANSINUSITIS: Chronic | ICD-10-CM

## 2023-01-20 DIAGNOSIS — L98.9 SKIN LESIONS: ICD-10-CM

## 2023-01-20 PROCEDURE — 99214 OFFICE O/P EST MOD 30 MIN: CPT | Performed by: FAMILY MEDICINE

## 2023-01-20 RX ORDER — AMOXICILLIN AND CLAVULANATE POTASSIUM 875; 125 MG/1; MG/1
1 TABLET, FILM COATED ORAL 2 TIMES DAILY
Qty: 14 TABLET | Refills: 0 | Status: SHIPPED | OUTPATIENT
Start: 2023-01-20

## 2023-01-20 RX ORDER — PREDNISONE 10 MG/1
10 TABLET ORAL SEE ADMIN INSTRUCTIONS
Qty: 17 TABLET | Refills: 0 | Status: SHIPPED | OUTPATIENT
Start: 2023-01-20 | End: 2023-02-01

## 2023-01-20 NOTE — PROGRESS NOTES
Chief Complaint  Back Pain, Sciatica, Constipation, Shoulder Pain, Skin Problem (hands), Cough, and Sinusitis    Subjective          Review of Systems   Constitutional: Negative for activity change, appetite change, fatigue and unexpected weight change.   HENT: Positive for postnasal drip and sinus pressure. Negative for dental problem, drooling, ear discharge, ear pain, facial swelling, hearing loss, mouth sores, nosebleeds, rhinorrhea, sneezing, sore throat, tinnitus, trouble swallowing and voice change.    Eyes: Negative for visual disturbance.        Eye exam Vision works   Respiratory: Positive for snoring. Negative for apnea, cough, choking, chest tightness, wheezing and stridor.    Cardiovascular: Negative for palpitations and leg swelling.   Gastrointestinal: Positive for constipation. Negative for abdominal distention, anal bleeding, blood in stool and rectal pain.               Endocrine: Negative for cold intolerance, heat intolerance, polydipsia, polyphagia and polyuria.   Genitourinary: Negative for decreased urine volume, difficulty urinating, enuresis, genital sores, hematuria and penile swelling.        Pain from back shoots into R testicle has resolved   Musculoskeletal: Negative for back pain, gait problem, neck pain and neck stiffness.        No back pain today     Skin: Negative for color change, pallor and wound.   Allergic/Immunologic: Positive for environmental allergies. Negative for food allergies and immunocompromised state.   Neurological: Negative for dizziness, tremors, seizures, syncope, facial asymmetry, speech difficulty and light-headedness.        Daytime sleepiness.   Hematological: Negative for adenopathy. Does not bruise/bleed easily.   Psychiatric/Behavioral: Negative for agitation, behavioral problems, confusion, decreased concentration, dysphoric mood, hallucinations, self-injury, sleep disturbance and suicidal ideas. The patient is not nervous/anxious and is not hyperactive.         Adolfo Nicolás Barron presents to [facility] [place]  Back Pain  This is a chronic problem. The current episode started more than 1 year ago. The problem occurs daily. The problem has been waxing and waning since onset. The pain is present in the lumbar spine. The pain is at a severity of 0/10. The pain is mild. He has tried analgesics, NSAIDs and muscle relaxant for the symptoms. The treatment provided significant relief.   Sciatica  This is a chronic problem. The current episode started more than 1 year ago. The problem occurs intermittently. The problem has been gradually improving. Pertinent negatives include no coughing, fatigue, neck pain or sore throat. He has tried acetaminophen, NSAIDs, position changes, relaxation and rest for the symptoms. The treatment provided significant relief.   Constipation  This is a chronic problem. The problem has been gradually improving since onset. The stool is described as formed. He does not exercise regularly. There has not been adequate water intake. Pertinent negatives include no back pain, difficulty urinating or rectal pain. He has tried laxatives, mineral oil, stool softeners, fiber and diet changes for the symptoms. The treatment provided moderate relief.   Skin Problem  This is a chronic problem. The current episode started more than 1 year ago. The problem occurs daily. The problem has been waxing and waning. Pertinent negatives include no coughing, fatigue, neck pain or sore throat. Treatments tried: Muprocin, Kenalog. The treatment provided moderate relief.   Cough  This is a chronic problem. The current episode started more than 1 month ago. The problem has been waxing and waning. The problem occurs hourly. Cough characteristics: Post nasal drainage. Associated symptoms include postnasal drip. Pertinent negatives include no ear pain, rhinorrhea, sore throat or wheezing. He has tried OTC cough suppressant and  "prescription cough suppressant (Antibiotic, Steroids) for the symptoms. The treatment provided mild relief. His past medical history is significant for environmental allergies.   Fatigue  This is a recurrent problem. The current episode started more than 1 month ago. The problem occurs daily. The problem has been waxing and waning. Pertinent negatives include no coughing, fatigue, neck pain or sore throat. Associated symptoms comments: Daytime sleepiness. Exacerbated by: Working night shift. Snores,  He has tried rest (Antibiotics, Antihistamines. ) for the symptoms. The treatment provided mild relief.   Snoring  This is a chronic (Had sleep study don't qaulify for CPAP, have a wedge pillow. ) problem. The current episode started more than 1 year ago. Pertinent negatives include no coughing, fatigue, neck pain or sore throat. Exacerbated by: Sleep position. He has tried position changes for the symptoms. The treatment provided mild relief.   Allergies  This is a chronic problem. The current episode started more than 1 year ago. The problem occurs daily. The problem has been waxing and waning. Pertinent negatives include no coughing, fatigue, neck pain or sore throat. Exacerbated by: Pollen. Treatments tried: Antihistamine. The treatment provided moderate relief.   Erectile Dysfunction  This is a chronic problem. The current episode started more than 1 year ago. He reports no anxiety. Pertinent negatives include no hematuria. Past treatments include sildenafil. The treatment provided moderate relief.       Objective   Vital Signs:   /86 (BP Location: Left arm, Patient Position: Sitting, Cuff Size: Adult)   Pulse 79   Temp 97.3 °F (36.3 °C) (Temporal)   Ht 177.8 cm (70\")   Wt 109 kg (239 lb 4.8 oz)   SpO2 98%   BMI 34.34 kg/m²     Physical Exam  Vitals and nursing note reviewed.   Constitutional:       General: He is not in acute distress.     Appearance: Normal appearance. He is well-developed. He is not " ill-appearing, toxic-appearing or diaphoretic.   HENT:      Head: Normocephalic.      Right Ear: Tympanic membrane, ear canal and external ear normal. There is no impacted cerumen.      Left Ear: Tympanic membrane, ear canal and external ear normal. There is no impacted cerumen.      Ears:      Comments: Tan fluid behind TM, can't Clear ET on L     Nose: Nose normal. No congestion or rhinorrhea.      Mouth/Throat:      Mouth: Mucous membranes are moist.      Pharynx: Oropharynx is clear. No oropharyngeal exudate or posterior oropharyngeal erythema.   Eyes:      General: No scleral icterus.        Right eye: No discharge.         Left eye: No discharge.      Extraocular Movements: Extraocular movements intact.      Conjunctiva/sclera: Conjunctivae normal.      Pupils: Pupils are equal, round, and reactive to light.   Neck:      Thyroid: No thyromegaly.      Vascular: No JVD.      Trachea: No tracheal deviation.   Cardiovascular:      Rate and Rhythm: Normal rate and regular rhythm.      Heart sounds: Normal heart sounds. No murmur heard.    No friction rub. No gallop.   Pulmonary:      Effort: Pulmonary effort is normal. No respiratory distress.      Breath sounds: Normal breath sounds. No stridor. No wheezing, rhonchi or rales.   Chest:      Chest wall: No tenderness.   Abdominal:      General: Bowel sounds are normal. There is no distension.      Palpations: Abdomen is soft. There is no mass.      Tenderness: There is no abdominal tenderness. There is no right CVA tenderness, left CVA tenderness, guarding or rebound.      Hernia: No hernia is present.   Musculoskeletal:         General: No swelling, tenderness or deformity. Normal range of motion.      Right lower leg: No edema.      Left lower leg: No edema.   Lymphadenopathy:      Cervical: No cervical adenopathy.   Skin:     General: Skin is warm and dry.      Capillary Refill: Capillary refill takes 2 to 3 seconds.      Coloration: Skin is not jaundiced or  pale.      Findings: No bruising, erythema, lesion or rash.   Neurological:      General: No focal deficit present.      Mental Status: He is alert and oriented to person, place, and time.      Cranial Nerves: No cranial nerve deficit.      Motor: No weakness or abnormal muscle tone.      Coordination: Coordination normal.      Gait: Gait normal.      Deep Tendon Reflexes: Reflexes are normal and symmetric. Reflexes normal.   Psychiatric:         Mood and Affect: Mood normal.         Behavior: Behavior normal.         Thought Content: Thought content normal.         Judgment: Judgment normal.        Result Review :     Lipid Panel    Lipid Panel 8/19/22   Total Cholesterol 173   Triglycerides 91   HDL Cholesterol 41   VLDL Cholesterol 17   LDL Cholesterol  115 (A)   LDL/HDL Ratio 2.78   (A) Abnormal value          The 10-year ASCVD risk score (Tricia SALEH, et al., 2019) is: 6.2%    Values used to calculate the score:      Age: 52 years      Sex: Male      Is Non- : Yes      Diabetic: No      Tobacco smoker: No      Systolic Blood Pressure: 128 mmHg      Is BP treated: No      HDL Cholesterol: 41 mg/dL      Total Cholesterol: 173 mg/dL              Assessment and Plan    Diagnoses and all orders for this visit:    1. Left otitis media, unspecified otitis media type  -     predniSONE (DELTASONE) 10 MG tablet; Take 1 tablet by mouth See Admin Instructions. Take 1 Tab Tid x3 days, Then 1 Tab Bid x 2 days Then 1 Tab QD x4 days,then D/C  Dispense: 17 tablet; Refill: 0  -     amoxicillin-clavulanate (Augmentin) 875-125 MG per tablet; Take 1 tablet by mouth 2 (Two) Times a Day.  Dispense: 14 tablet; Refill: 0    2. Chronic idiopathic constipation    3. Skin lesions  -     triamcinolone (KENALOG) 0.1 % ointment; Apply 1 application topically to the appropriate area as directed 2 (Two) Times a Day.  Dispense: 80 g; Refill: 1  -     mupirocin (BACTROBAN) 2 % ointment; Apply 1 application topically to the  appropriate area as directed 3 (Three) Times a Day.  Dispense: 30 g; Refill: 1    4. Environmental allergies  -     predniSONE (DELTASONE) 10 MG tablet; Take 1 tablet by mouth See Admin Instructions. Take 1 Tab Tid x3 days, Then 1 Tab Bid x 2 days Then 1 Tab QD x4 days,then D/C  Dispense: 17 tablet; Refill: 0    5. Acute recurrent pansinusitis  -     predniSONE (DELTASONE) 10 MG tablet; Take 1 tablet by mouth See Admin Instructions. Take 1 Tab Tid x3 days, Then 1 Tab Bid x 2 days Then 1 Tab QD x4 days,then D/C  Dispense: 17 tablet; Refill: 0  -     amoxicillin-clavulanate (Augmentin) 875-125 MG per tablet; Take 1 tablet by mouth 2 (Two) Times a Day.  Dispense: 14 tablet; Refill: 0        Follow Up   Return in about 6 months (around 7/20/2023).  Patient was given instructions and counseling regarding his condition or for health maintenance advice. Please see specific information pulled into the AVS if appropriate.       Answers for HPI/ROS submitted by the patient on 1/19/2023  What is the primary reason for your visit?: Rash        This document has been electronically signed by Gilles Canales MD on January 22, 2023 13:09 CST

## 2023-01-20 NOTE — PROGRESS NOTES
Answers for HPI/ROS submitted by the patient on 1/19/2023  What is the primary reason for your visit?: Rash  Chronicity: new  Onset: more than 1 month ago  Characteristics: dryness, peeling, scaling  Exposed to: unknown  anorexia: No  congestion: No  cough: No  diarrhea: No  eye pain: No  facial edema: No  fatigue: No  fever: No  joint pain: No  nail changes: No  rhinorrhea: No  shortness of breath: No  sore throat: No  vomiting: No

## 2023-01-22 PROBLEM — M54.41 CHRONIC BILATERAL LOW BACK PAIN WITH RIGHT-SIDED SCIATICA: Status: ACTIVE | Noted: 2020-09-13

## 2023-01-22 PROBLEM — Z91.09 ENVIRONMENTAL ALLERGIES: Chronic | Status: ACTIVE | Noted: 2023-01-22

## 2023-01-22 PROBLEM — H66.92 LEFT OTITIS MEDIA: Chronic | Status: ACTIVE | Noted: 2023-01-22

## 2023-01-22 PROBLEM — L98.9 SKIN LESIONS: Status: ACTIVE | Noted: 2022-01-20

## 2023-02-01 ENCOUNTER — TELEPHONE (OUTPATIENT)
Dept: FAMILY MEDICINE CLINIC | Facility: CLINIC | Age: 53
End: 2023-02-01
Payer: COMMERCIAL

## 2023-02-01 RX ORDER — CIPROFLOXACIN 500 MG/1
500 TABLET, FILM COATED ORAL 2 TIMES DAILY
Qty: 10 TABLET | Refills: 0 | Status: SHIPPED | OUTPATIENT
Start: 2023-02-01

## 2023-02-01 NOTE — TELEPHONE ENCOUNTER
PATIENT CALLED AND STATED THAT THE MEDICATION FOR THE SINUS/EAR INFECTION HASNT HELPED TOTALLY.  HE IS STILL HAVING EAR PAIN AND IT IS GOING DOWN INTO HIS JAW HE IS ASKING TO HAVE SOMETHING ELSE SENT IN FOR HIM TO [facility]. [phone] WIFES NUMBER IF NEED TO CALL

## 2023-07-23 PROBLEM — N52.9 ERECTILE DYSFUNCTION: Status: ACTIVE | Noted: 2023-07-23

## 2023-09-14 ENCOUNTER — OFFICE VISIT (OUTPATIENT)
Dept: FAMILY MEDICINE CLINIC | Facility: CLINIC | Age: 53
End: 2023-09-14
Payer: COMMERCIAL

## 2023-09-14 ENCOUNTER — LAB (OUTPATIENT)
Dept: LAB | Facility: HOSPITAL | Age: 53
End: 2023-09-14
Payer: COMMERCIAL

## 2023-09-14 VITALS
HEART RATE: 70 BPM | DIASTOLIC BLOOD PRESSURE: 88 MMHG | WEIGHT: 235 LBS | HEIGHT: 69 IN | TEMPERATURE: 97.7 F | SYSTOLIC BLOOD PRESSURE: 130 MMHG | BODY MASS INDEX: 34.8 KG/M2 | OXYGEN SATURATION: 99 %

## 2023-09-14 DIAGNOSIS — Z00.00 PREVENTATIVE HEALTH CARE: ICD-10-CM

## 2023-09-14 DIAGNOSIS — Z00.00 ANNUAL PHYSICAL EXAM: ICD-10-CM

## 2023-09-14 DIAGNOSIS — R07.9 RIGHT-SIDED CHEST PAIN: Primary | ICD-10-CM

## 2023-09-14 DIAGNOSIS — F43.9 STRESS: ICD-10-CM

## 2023-09-14 LAB
ALBUMIN SERPL-MCNC: 4.2 G/DL (ref 3.5–5.2)
ALBUMIN/GLOB SERPL: 1.4 G/DL
ALP SERPL-CCNC: 65 U/L (ref 39–117)
ALT SERPL W P-5'-P-CCNC: 23 U/L (ref 1–41)
ANION GAP SERPL CALCULATED.3IONS-SCNC: 10 MMOL/L (ref 5–15)
AST SERPL-CCNC: 26 U/L (ref 1–40)
BASOPHILS # BLD AUTO: 0.03 10*3/MM3 (ref 0–0.2)
BASOPHILS NFR BLD AUTO: 1 % (ref 0–1.5)
BILIRUB SERPL-MCNC: 0.5 MG/DL (ref 0–1.2)
BILIRUB UR QL STRIP: NEGATIVE
BUN SERPL-MCNC: 10 MG/DL (ref 6–20)
BUN/CREAT SERPL: 8.6 (ref 7–25)
CALCIUM SPEC-SCNC: 9.7 MG/DL (ref 8.6–10.5)
CHLORIDE SERPL-SCNC: 103 MMOL/L (ref 98–107)
CHOLEST SERPL-MCNC: 200 MG/DL (ref 0–200)
CLARITY UR: CLEAR
CO2 SERPL-SCNC: 26 MMOL/L (ref 22–29)
COLOR UR: YELLOW
CREAT SERPL-MCNC: 1.16 MG/DL (ref 0.76–1.27)
D-DIMER, QUANTITATIVE (MAD,POW, STR): <270 NG/ML (FEU) (ref 0–530)
DEPRECATED RDW RBC AUTO: 42.9 FL (ref 37–54)
EGFRCR SERPLBLD CKD-EPI 2021: 75.3 ML/MIN/1.73
EOSINOPHIL # BLD AUTO: 0.13 10*3/MM3 (ref 0–0.4)
EOSINOPHIL NFR BLD AUTO: 4.1 % (ref 0.3–6.2)
ERYTHROCYTE [DISTWIDTH] IN BLOOD BY AUTOMATED COUNT: 13.4 % (ref 12.3–15.4)
GLOBULIN UR ELPH-MCNC: 3.1 GM/DL
GLUCOSE SERPL-MCNC: 83 MG/DL (ref 65–99)
GLUCOSE UR STRIP-MCNC: NEGATIVE MG/DL
HCT VFR BLD AUTO: 45.6 % (ref 37.5–51)
HDLC SERPL-MCNC: 48 MG/DL (ref 40–60)
HGB BLD-MCNC: 16 G/DL (ref 13–17.7)
HGB UR QL STRIP.AUTO: NEGATIVE
IMM GRANULOCYTES # BLD AUTO: 0.01 10*3/MM3 (ref 0–0.05)
IMM GRANULOCYTES NFR BLD AUTO: 0.3 % (ref 0–0.5)
KETONES UR QL STRIP: NEGATIVE
LDLC SERPL CALC-MCNC: 141 MG/DL (ref 0–100)
LDLC/HDLC SERPL: 2.92 {RATIO}
LEUKOCYTE ESTERASE UR QL STRIP.AUTO: NEGATIVE
LYMPHOCYTES # BLD AUTO: 1.18 10*3/MM3 (ref 0.7–3.1)
LYMPHOCYTES NFR BLD AUTO: 37.6 % (ref 19.6–45.3)
MCH RBC QN AUTO: 30.8 PG (ref 26.6–33)
MCHC RBC AUTO-ENTMCNC: 35.1 G/DL (ref 31.5–35.7)
MCV RBC AUTO: 87.9 FL (ref 79–97)
MONOCYTES # BLD AUTO: 0.28 10*3/MM3 (ref 0.1–0.9)
MONOCYTES NFR BLD AUTO: 8.9 % (ref 5–12)
NEUTROPHILS NFR BLD AUTO: 1.51 10*3/MM3 (ref 1.7–7)
NEUTROPHILS NFR BLD AUTO: 48.1 % (ref 42.7–76)
NITRITE UR QL STRIP: NEGATIVE
NRBC BLD AUTO-RTO: 0 /100 WBC (ref 0–0.2)
PH UR STRIP.AUTO: 6 [PH] (ref 5–8)
PLATELET # BLD AUTO: 253 10*3/MM3 (ref 140–450)
PMV BLD AUTO: 10.7 FL (ref 6–12)
POTASSIUM SERPL-SCNC: 4 MMOL/L (ref 3.5–5.2)
PROT SERPL-MCNC: 7.3 G/DL (ref 6–8.5)
PROT UR QL STRIP: NEGATIVE
QT INTERVAL: 402 MS
QTC INTERVAL: 402 MS
RBC # BLD AUTO: 5.19 10*6/MM3 (ref 4.14–5.8)
SODIUM SERPL-SCNC: 139 MMOL/L (ref 136–145)
SP GR UR STRIP: 1.01 (ref 1–1.03)
TRIGL SERPL-MCNC: 59 MG/DL (ref 0–150)
TSH SERPL DL<=0.05 MIU/L-ACNC: 1.65 UIU/ML (ref 0.27–4.2)
UROBILINOGEN UR QL STRIP: NORMAL
VLDLC SERPL-MCNC: 11 MG/DL (ref 5–40)
WBC NRBC COR # BLD: 3.14 10*3/MM3 (ref 3.4–10.8)

## 2023-09-14 PROCEDURE — 85379 FIBRIN DEGRADATION QUANT: CPT | Performed by: NURSE PRACTITIONER

## 2023-09-14 PROCEDURE — 80061 LIPID PANEL: CPT

## 2023-09-14 PROCEDURE — 99214 OFFICE O/P EST MOD 30 MIN: CPT | Performed by: NURSE PRACTITIONER

## 2023-09-14 PROCEDURE — 93005 ELECTROCARDIOGRAM TRACING: CPT | Performed by: NURSE PRACTITIONER

## 2023-09-14 PROCEDURE — 81003 URINALYSIS AUTO W/O SCOPE: CPT

## 2023-09-14 PROCEDURE — 84443 ASSAY THYROID STIM HORMONE: CPT

## 2023-09-14 PROCEDURE — 93010 ELECTROCARDIOGRAM REPORT: CPT | Performed by: INTERNAL MEDICINE

## 2023-09-14 PROCEDURE — 80053 COMPREHEN METABOLIC PANEL: CPT

## 2023-09-14 PROCEDURE — 85025 COMPLETE CBC W/AUTO DIFF WBC: CPT

## 2023-09-14 NOTE — LETTER
September 14, 2023     Patient: Adolfo Barron   YOB: 1970   Date of Visit: 9/14/2023       To Whom It May Concern:    It is my medical opinion that Adolfo Barron may need to take personal leave due to ongoing current life stressors.        Sincerely,      This document has been electronically signed by LIBORIO Gooden on September 14, 2023 10:07 CDT,.      LIBORIO Gooden    CC: No Recipients  
Head atraumatic, normal cephalic shape.

## 2023-09-14 NOTE — PROGRESS NOTES
"Chief Complaint  Illness (  Resp  rt side (upper) pain X 2 wks Cough)    Subjective          Mata Nicolás Barron presents to [facility] [place]    History of Present Illness  [facility]      PCP: Dr. Canales    CC: \"right sided chest pain\"    C/O intermittent right sided chest pain x 3 weeks.  Reports comes at random and just feels a \"ping\" for a few seconds.  Does note that it seems to be more frequent if he is feeling stressed.  Currently has a lot of life stressors.  Working 12/days at work, 6 days/week and reports he is in a stressful position.  Also reports that his father is in hospice in [place], but his home/estate is in [place] and he is having to handle the estate from [place] while working.  Sister in law was also recently diagnosed with cancer.  Reports he normally exercises and that helps relieve stress, but hasn't had the time due to work and everything else going on for several weeks.  Concerned about his heart.  No significant cardiac history reported.  Reports an uncle and his father both have history of CHF.  Last EKG was 2019.  Non smoker.  Has screening labs ordered by PCP that he plans to have done today.  Has occasional cough, but no dyspnea.  Denies swelling, jaw or arm pain, nausea, dizziness, lightheadedness associated with the chest pain.         Review of Systems   Constitutional: Negative.    HENT: Negative.     Eyes: Negative.    Respiratory: Negative.     Cardiovascular:  Positive for chest pain (right side). Negative for palpitations and leg swelling.   Gastrointestinal: Negative.    Genitourinary: Negative.    Musculoskeletal: Negative.    Skin: Negative.    Neurological:  Negative for dizziness, speech difficulty, weakness, light-headedness, numbness and headaches.   Psychiatric/Behavioral:  Positive for sleep disturbance. Negative for hallucinations, self-injury and suicidal ideas. The patient is nervous/anxious (about health). " "        +stress      Objective   Vital Signs:   /88 (BP Location: Left arm, Patient Position: Sitting, Cuff Size: Large Adult)   Pulse 70   Temp 97.7 °F (36.5 °C) (Oral)   Ht 175.3 cm (69\")   Wt 107 kg (235 lb)   SpO2 99%   BMI 34.70 kg/m²       Physical Exam  Vitals and nursing note reviewed.   Constitutional:       General: He is not in acute distress.     Appearance: He is not ill-appearing.   HENT:      Head: Normocephalic and atraumatic.   Cardiovascular:      Rate and Rhythm: Normal rate and regular rhythm.   Pulmonary:      Effort: Pulmonary effort is normal. No respiratory distress.      Breath sounds: Normal breath sounds. No wheezing, rhonchi or rales.   Abdominal:      General: Bowel sounds are normal.      Palpations: Abdomen is soft.      Tenderness: There is no abdominal tenderness. There is no right CVA tenderness, left CVA tenderness, guarding or rebound.   Musculoskeletal:      Cervical back: Neck supple. No tenderness.      Right lower leg: No edema.      Left lower leg: No edema.   Lymphadenopathy:      Cervical: No cervical adenopathy.   Skin:     General: Skin is warm and dry.   Neurological:      General: No focal deficit present.      Mental Status: He is alert and oriented to person, place, and time.   Psychiatric:         Mood and Affect: Mood normal.        Result Review :       EKG: normal EKG, normal sinus rhythm. Official cardiology read is pending.     XR Ribs Right With PA Chest    Result Date: 9/14/2023  1.  No acute intrathoracic process. 2.  No rib fracture.          Data reviewed : Cardiology studies EKG 2019.      Labs from 2022 reviewed         Assessment and Plan    Diagnoses and all orders for this visit:    1. Right-sided chest pain (Primary)  -     XR Ribs Right With PA Chest  -     ECG 12 Lead  -     D-dimer, Quantitative    2. Stress      VSS.  EKG and CXR both normal.   Recommended he get labs as ordered by PCP, including CBC, CMP, TSH, Lipids.   Will add D " dimer to rule out PE--will notify with results when available    Will try to find time to exercise to help relieve stress.  Other stress management techniques encouraged.     If persistent right sided chest pain, can consider cardiology referral if patient desires.     Reports he discussed with HR today that he may need personal leave.  Letter regarding life stressors given to patient today.       This document has been electronically signed by LIBORIO Gooden on September 14, 2023 10:55 CDT,.    I spent 30 minutes caring for Adolfo on this date of service. This time includes time spent by me in the following activities:preparing for the visit, reviewing tests, performing a medically appropriate examination and/or evaluation , counseling and educating the patient/family/caregiver, ordering medications, tests, or procedures, and documenting information in the medical record